# Patient Record
Sex: FEMALE | Race: WHITE | Employment: UNEMPLOYED | URBAN - METROPOLITAN AREA
[De-identification: names, ages, dates, MRNs, and addresses within clinical notes are randomized per-mention and may not be internally consistent; named-entity substitution may affect disease eponyms.]

---

## 2017-01-03 ENCOUNTER — OFFICE VISIT (OUTPATIENT)
Dept: FAMILY MEDICINE CLINIC | Facility: CLINIC | Age: 62
End: 2017-01-03

## 2017-01-03 VITALS
WEIGHT: 150 LBS | HEART RATE: 78 BPM | BODY MASS INDEX: 24.99 KG/M2 | TEMPERATURE: 99 F | DIASTOLIC BLOOD PRESSURE: 68 MMHG | RESPIRATION RATE: 16 BRPM | HEIGHT: 65 IN | SYSTOLIC BLOOD PRESSURE: 112 MMHG

## 2017-01-03 DIAGNOSIS — I10 ESSENTIAL HYPERTENSION: ICD-10-CM

## 2017-01-03 DIAGNOSIS — E03.9 HYPOTHYROIDISM, UNSPECIFIED TYPE: ICD-10-CM

## 2017-01-03 DIAGNOSIS — D22.9 ATYPICAL NEVI: ICD-10-CM

## 2017-01-03 DIAGNOSIS — Z23 NEED FOR INFLUENZA VACCINATION: ICD-10-CM

## 2017-01-03 DIAGNOSIS — E04.9 GOITER: ICD-10-CM

## 2017-01-03 DIAGNOSIS — Z12.31 ENCOUNTER FOR SCREENING MAMMOGRAM FOR HIGH-RISK PATIENT: ICD-10-CM

## 2017-01-03 DIAGNOSIS — E78.00 ELEVATED CHOLESTEROL: ICD-10-CM

## 2017-01-03 DIAGNOSIS — F17.200 SMOKER: ICD-10-CM

## 2017-01-03 DIAGNOSIS — R91.1 LUNG NODULE: ICD-10-CM

## 2017-01-03 DIAGNOSIS — Z01.419 WELL WOMAN EXAM WITH ROUTINE GYNECOLOGICAL EXAM: Primary | ICD-10-CM

## 2017-01-03 PROCEDURE — 90472 IMMUNIZATION ADMIN EACH ADD: CPT | Performed by: FAMILY MEDICINE

## 2017-01-03 PROCEDURE — 99396 PREV VISIT EST AGE 40-64: CPT | Performed by: FAMILY MEDICINE

## 2017-01-03 PROCEDURE — 90670 PCV13 VACCINE IM: CPT | Performed by: FAMILY MEDICINE

## 2017-01-03 PROCEDURE — 90686 IIV4 VACC NO PRSV 0.5 ML IM: CPT | Performed by: FAMILY MEDICINE

## 2017-01-03 PROCEDURE — 90471 IMMUNIZATION ADMIN: CPT | Performed by: FAMILY MEDICINE

## 2017-01-03 RX ORDER — SIMVASTATIN 20 MG
TABLET ORAL
Qty: 90 TABLET | Refills: 1 | Status: SHIPPED | OUTPATIENT
Start: 2017-01-03 | End: 2017-08-24

## 2017-01-03 RX ORDER — METOPROLOL SUCCINATE 50 MG/1
50 TABLET, EXTENDED RELEASE ORAL
Qty: 90 TABLET | Refills: 1 | Status: SHIPPED | OUTPATIENT
Start: 2017-01-03 | End: 2017-08-24

## 2017-01-03 RX ORDER — LEVOTHYROXINE SODIUM 88 UG/1
88 TABLET ORAL
Qty: 90 TABLET | Refills: 1 | Status: SHIPPED | OUTPATIENT
Start: 2017-01-03 | End: 2017-08-24

## 2017-01-03 NOTE — PROGRESS NOTES
HPI:   Keven Anderson is a 62year old female who presents for a complete physical exam.     Wt Readings from Last 6 Encounters:  01/03/17 : 150 lb  12/08/16 : 147 lb  09/02/16 : 149 lb  07/09/16 : 153 lb  06/24/16 : 150 lb  06/10/16 : 150 lb    Body mass Tablet 24 Hr TAKE 1 TABLET BY MOUTH EVERY DAY Disp: 30 tablet Rfl: 0   Hydroxychloroquine Sulfate (PLAQUENIL) 200 MG Oral Tab Take 1 tablet (200 mg total) by mouth 2 (two) times daily.  Disp: 180 tablet Rfl: 3   naproxen 500 MG Oral Tab Take 1 tablet (500 m Comment adenoidectomy    NEEDLE BIOPSY RIGHT  2012    Comment US guided biopsy-benign    TAISHA NEEDLE LOCALIZATION W/ SPECIMEN 1 SITE RIGHT  0323,1217    TAISHA NEEDLE LOCALIZATION W/ SPECIMEN 1 SITE LEFT  1999    Comment ADH    OOPHORECTOMY  1989      [facility] H tenderness  CHEST: no chest tenderness  BREAST: no axillary LAD, no masses no nipple discharge bilaterally  : external genitalia - no inguinal LAD, no lesions. Speculum exam- introitus is normal,scant discharge, normal vaginal cuff.  Bimanual exam- no ad patient indicates understanding of these issues and agrees to the plan. Follow up in  6 months or sooner if needed.

## 2017-02-16 ENCOUNTER — LAB ENCOUNTER (OUTPATIENT)
Dept: LAB | Age: 62
End: 2017-02-16
Attending: FAMILY MEDICINE
Payer: COMMERCIAL

## 2017-02-16 ENCOUNTER — HOSPITAL ENCOUNTER (OUTPATIENT)
Dept: ULTRASOUND IMAGING | Age: 62
Discharge: HOME | End: 2017-02-16
Attending: FAMILY MEDICINE
Payer: COMMERCIAL

## 2017-02-16 ENCOUNTER — HOSPITAL ENCOUNTER (OUTPATIENT)
Dept: MAMMOGRAPHY | Age: 62
Discharge: HOME | End: 2017-02-16
Attending: FAMILY MEDICINE
Payer: COMMERCIAL

## 2017-02-16 DIAGNOSIS — E04.9 GOITER: ICD-10-CM

## 2017-02-16 DIAGNOSIS — Z12.31 ENCOUNTER FOR SCREENING MAMMOGRAM FOR HIGH-RISK PATIENT: ICD-10-CM

## 2017-02-16 DIAGNOSIS — Z01.419 WELL WOMAN EXAM WITH ROUTINE GYNECOLOGICAL EXAM: ICD-10-CM

## 2017-02-16 LAB
25-HYDROXYVITAMIN D (TOTAL): 31 NG/ML (ref 30–100)
ALBUMIN SERPL-MCNC: 4 G/DL (ref 3.5–4.8)
ALP LIVER SERPL-CCNC: 95 U/L (ref 46–118)
ALT SERPL-CCNC: 27 U/L (ref 14–54)
AST SERPL-CCNC: 23 U/L (ref 15–41)
BASOPHILS # BLD AUTO: 0.07 X10(3) UL (ref 0–0.1)
BASOPHILS NFR BLD AUTO: 0.8 %
BILIRUB SERPL-MCNC: 0.8 MG/DL (ref 0.1–2)
BUN BLD-MCNC: 13 MG/DL (ref 8–20)
CALCIUM BLD-MCNC: 8.2 MG/DL (ref 8.3–10.3)
CHLORIDE: 106 MMOL/L (ref 101–111)
CHOLEST SMN-MCNC: 204 MG/DL (ref ?–200)
CO2: 27 MMOL/L (ref 22–32)
CREAT BLD-MCNC: 0.98 MG/DL (ref 0.55–1.02)
EOSINOPHIL # BLD AUTO: 0.2 X10(3) UL (ref 0–0.3)
EOSINOPHIL NFR BLD AUTO: 2.3 %
ERYTHROCYTE [DISTWIDTH] IN BLOOD BY AUTOMATED COUNT: 14.6 % (ref 11.5–16)
FREE T4: 1 NG/DL (ref 0.9–1.8)
GLUCOSE BLD-MCNC: 88 MG/DL (ref 70–99)
HAV AB SERPL IA-ACNC: 635 PG/ML (ref 193–986)
HCT VFR BLD AUTO: 45.4 % (ref 34–50)
HDLC SERPL-MCNC: 89 MG/DL (ref 45–?)
HDLC SERPL: 2.29 {RATIO} (ref ?–4.44)
HGB BLD-MCNC: 14 G/DL (ref 12–16)
IMMATURE GRANULOCYTE COUNT: 0.03 X10(3) UL (ref 0–1)
IMMATURE GRANULOCYTE RATIO %: 0.3 %
LDLC SERPL CALC-MCNC: 104 MG/DL (ref ?–130)
LYMPHOCYTES # BLD AUTO: 2.17 X10(3) UL (ref 0.9–4)
LYMPHOCYTES NFR BLD AUTO: 24.5 %
M PROTEIN MFR SERPL ELPH: 7.2 G/DL (ref 6.1–8.3)
MCH RBC QN AUTO: 29.8 PG (ref 27–33.2)
MCHC RBC AUTO-ENTMCNC: 30.8 G/DL (ref 31–37)
MCV RBC AUTO: 96.6 FL (ref 81–100)
MONOCYTES # BLD AUTO: 0.52 X10(3) UL (ref 0.1–0.6)
MONOCYTES NFR BLD AUTO: 5.9 %
NEUTROPHIL ABS PRELIM: 5.86 X10 (3) UL (ref 1.3–6.7)
NEUTROPHILS # BLD AUTO: 5.86 X10(3) UL (ref 1.3–6.7)
NEUTROPHILS NFR BLD AUTO: 66.2 %
NONHDLC SERPL-MCNC: 115 MG/DL (ref ?–130)
PLATELET # BLD AUTO: 294 10(3)UL (ref 150–450)
POTASSIUM SERPL-SCNC: 4.4 MMOL/L (ref 3.6–5.1)
RBC # BLD AUTO: 4.7 X10(6)UL (ref 3.8–5.1)
RED CELL DISTRIBUTION WIDTH-SD: 51.4 FL (ref 35.1–46.3)
SODIUM SERPL-SCNC: 141 MMOL/L (ref 136–144)
T3FREE SERPL-MCNC: 2.18 PG/ML (ref 2.3–4.2)
TRIGLYCERIDES: 56 MG/DL (ref ?–150)
TSI SER-ACNC: 1.06 MIU/ML (ref 0.35–5.5)
VLDL: 11 MG/DL (ref 5–40)
WBC # BLD AUTO: 8.9 X10(3) UL (ref 4–13)

## 2017-02-16 PROCEDURE — 80061 LIPID PANEL: CPT

## 2017-02-16 PROCEDURE — 77067 SCR MAMMO BI INCL CAD: CPT

## 2017-02-16 PROCEDURE — 84439 ASSAY OF FREE THYROXINE: CPT

## 2017-02-16 PROCEDURE — 84443 ASSAY THYROID STIM HORMONE: CPT

## 2017-02-16 PROCEDURE — 82607 VITAMIN B-12: CPT

## 2017-02-16 PROCEDURE — 85025 COMPLETE CBC W/AUTO DIFF WBC: CPT

## 2017-02-16 PROCEDURE — 84481 FREE ASSAY (FT-3): CPT

## 2017-02-16 PROCEDURE — 80053 COMPREHEN METABOLIC PANEL: CPT

## 2017-02-16 PROCEDURE — 82306 VITAMIN D 25 HYDROXY: CPT

## 2017-02-16 PROCEDURE — 77063 BREAST TOMOSYNTHESIS BI: CPT

## 2017-02-16 PROCEDURE — 36415 COLL VENOUS BLD VENIPUNCTURE: CPT

## 2017-02-16 PROCEDURE — 76536 US EXAM OF HEAD AND NECK: CPT

## 2017-03-23 ENCOUNTER — LAB ENCOUNTER (OUTPATIENT)
Dept: LAB | Age: 62
End: 2017-03-23
Attending: DERMATOLOGY
Payer: COMMERCIAL

## 2017-03-23 DIAGNOSIS — R23.2 FLUSHING: Primary | ICD-10-CM

## 2017-03-23 LAB
CREAT UR-SCNC: 1.47 G/24 HR (ref 0.6–1.8)
SPECIMEN VOL UR: 1800 ML

## 2017-03-23 PROCEDURE — 36415 COLL VENOUS BLD VENIPUNCTURE: CPT

## 2017-03-23 PROCEDURE — 83835 ASSAY OF METANEPHRINES: CPT

## 2017-03-23 PROCEDURE — 83520 IMMUNOASSAY QUANT NOS NONAB: CPT

## 2017-03-23 PROCEDURE — 82570 ASSAY OF URINE CREATININE: CPT

## 2017-03-23 PROCEDURE — 83497 ASSAY OF 5-HIAA: CPT

## 2017-03-25 ENCOUNTER — NURSE ONLY (OUTPATIENT)
Dept: FAMILY MEDICINE CLINIC | Facility: CLINIC | Age: 62
End: 2017-03-25

## 2017-03-25 PROCEDURE — 90732 PPSV23 VACC 2 YRS+ SUBQ/IM: CPT | Performed by: FAMILY MEDICINE

## 2017-03-25 PROCEDURE — 90471 IMMUNIZATION ADMIN: CPT | Performed by: FAMILY MEDICINE

## 2017-03-26 LAB
5-HIAA URINE - PER 24H: 4 MG/D
5-HIAA URINE - PER VOLUME: 2.5 MG/L
5-HIAA URINE - RATIO TO CRT: 3 MG/GCR
CREATININE, URINE - PER 24H: 1332 MG/D
CREATININE, URINE - PER VOLUME: 74 MG/DL
HOURS COLLECTED: 24 HR
TOTAL VOLUME: 1800 ML
TRYPTASE: 8.4 UG/L

## 2017-03-28 LAB
CREATININE, URINE - PER 24H: 1332 MG/D
CREATININE, URINE - PER VOLUME: 74 MG/DL
HOURS COLLECTED: 24 HR
METANEPHRINE, UR- RATIO TO CRT: 85 UG/G CRT
METANEPHRINE, URINE - PER 24H: 113 UG/D
METANEPHRINE, URINE-PER VOLUME: 63 UG/L
NORMETANEPHRINE, UR-PER VOLUME: 218 UG/L
NORMETANEPHRINE, URINE - RATIO: 295 UG/G CRT
NORMETANEPHRINE, URINE-PER 24H: 392 UG/D
TOTAL VOLUME: 1800 ML

## 2017-05-23 ENCOUNTER — TELEPHONE (OUTPATIENT)
Dept: FAMILY MEDICINE CLINIC | Facility: CLINIC | Age: 62
End: 2017-05-23

## 2017-05-23 RX ORDER — PREDNISONE 50 MG/1
TABLET ORAL
Qty: 3 TABLET | Refills: 0 | Status: SHIPPED | OUTPATIENT
Start: 2017-05-23 | End: 2017-05-25

## 2017-05-23 NOTE — TELEPHONE ENCOUNTER
Needs prep kit for chest CT because your allergic to iodine. Patient will also need instructions on how to take. Test is Friday. Please call patient.  Tank Haywood,  Prednisone 50mg by mouth at 13 hours, 7 hours and 1 hour pr

## 2017-05-23 NOTE — TELEPHONE ENCOUNTER
rx sent to pharmacy. Patient called with instructions. informed patient she will need 50 mg of Benadryl to take 1 hour prior to test. Patient voiced understanding of instructions.

## 2017-05-24 ENCOUNTER — TELEPHONE (OUTPATIENT)
Dept: FAMILY MEDICINE CLINIC | Facility: CLINIC | Age: 62
End: 2017-05-24

## 2017-05-24 RX ORDER — METOPROLOL SUCCINATE 50 MG/1
TABLET, EXTENDED RELEASE ORAL
Qty: 30 TABLET | Refills: 0 | OUTPATIENT
Start: 2017-05-24

## 2017-05-24 RX ORDER — LEVOTHYROXINE SODIUM 88 UG/1
TABLET ORAL
Qty: 30 TABLET | Refills: 0 | OUTPATIENT
Start: 2017-05-24

## 2017-05-24 NOTE — TELEPHONE ENCOUNTER
Message from CT department:  Coming in on Friday for CT chest with contrast for lung nodule.  Don't normally do contrast for these.  Last one was recommended for fup without contrast.  Pt is to be premedicated which won't be necessary if she isn't going to

## 2017-05-25 NOTE — TELEPHONE ENCOUNTER
Informed pt, instructed not to  the Prednisone, requested a return call with questions. Called pharmacy, Pt has not yet picked up medication, cancelled RX. Task completed.

## 2017-05-26 ENCOUNTER — HOSPITAL ENCOUNTER (OUTPATIENT)
Dept: CT IMAGING | Age: 62
Discharge: HOME | End: 2017-05-26
Attending: FAMILY MEDICINE
Payer: COMMERCIAL

## 2017-05-26 DIAGNOSIS — R91.1 LUNG NODULE: ICD-10-CM

## 2017-05-26 DIAGNOSIS — F17.200 SMOKER: ICD-10-CM

## 2017-05-26 PROCEDURE — 71250 CT THORAX DX C-: CPT | Performed by: FAMILY MEDICINE

## 2017-06-22 ENCOUNTER — OFFICE VISIT (OUTPATIENT)
Dept: FAMILY MEDICINE CLINIC | Facility: CLINIC | Age: 62
End: 2017-06-22

## 2017-06-22 VITALS
HEIGHT: 65 IN | DIASTOLIC BLOOD PRESSURE: 72 MMHG | SYSTOLIC BLOOD PRESSURE: 116 MMHG | BODY MASS INDEX: 25.16 KG/M2 | OXYGEN SATURATION: 98 % | RESPIRATION RATE: 16 BRPM | TEMPERATURE: 98 F | HEART RATE: 70 BPM | WEIGHT: 151 LBS

## 2017-06-22 DIAGNOSIS — N30.00 ACUTE CYSTITIS WITHOUT HEMATURIA: Primary | ICD-10-CM

## 2017-06-22 PROCEDURE — 99213 OFFICE O/P EST LOW 20 MIN: CPT | Performed by: NURSE PRACTITIONER

## 2017-06-22 PROCEDURE — 87077 CULTURE AEROBIC IDENTIFY: CPT | Performed by: NURSE PRACTITIONER

## 2017-06-22 PROCEDURE — 87186 SC STD MICRODIL/AGAR DIL: CPT | Performed by: NURSE PRACTITIONER

## 2017-06-22 PROCEDURE — 81003 URINALYSIS AUTO W/O SCOPE: CPT | Performed by: NURSE PRACTITIONER

## 2017-06-22 PROCEDURE — 87086 URINE CULTURE/COLONY COUNT: CPT | Performed by: NURSE PRACTITIONER

## 2017-06-22 RX ORDER — NITROFURANTOIN 25; 75 MG/1; MG/1
CAPSULE ORAL
Qty: 14 CAPSULE | Refills: 0 | Status: SHIPPED | OUTPATIENT
Start: 2017-06-22 | End: 2017-08-24

## 2017-06-22 RX ORDER — PHENAZOPYRIDINE HYDROCHLORIDE 200 MG/1
TABLET, FILM COATED ORAL
Qty: 6 TABLET | Refills: 0 | Status: SHIPPED | OUTPATIENT
Start: 2017-06-22 | End: 2017-06-29

## 2017-06-29 ENCOUNTER — OFFICE VISIT (OUTPATIENT)
Dept: FAMILY MEDICINE CLINIC | Facility: CLINIC | Age: 62
End: 2017-06-29

## 2017-06-29 VITALS
TEMPERATURE: 103 F | HEART RATE: 112 BPM | WEIGHT: 151 LBS | OXYGEN SATURATION: 97 % | SYSTOLIC BLOOD PRESSURE: 128 MMHG | HEIGHT: 65 IN | BODY MASS INDEX: 25.16 KG/M2 | RESPIRATION RATE: 16 BRPM | DIASTOLIC BLOOD PRESSURE: 84 MMHG

## 2017-06-29 DIAGNOSIS — M54.9 ACUTE LEFT-SIDED BACK PAIN, UNSPECIFIED BACK LOCATION: ICD-10-CM

## 2017-06-29 DIAGNOSIS — R50.9 FEVER AND CHILLS: Primary | ICD-10-CM

## 2017-06-29 PROCEDURE — 99214 OFFICE O/P EST MOD 30 MIN: CPT | Performed by: FAMILY MEDICINE

## 2017-06-29 PROCEDURE — 87086 URINE CULTURE/COLONY COUNT: CPT | Performed by: FAMILY MEDICINE

## 2017-06-29 PROCEDURE — 96372 THER/PROPH/DIAG INJ SC/IM: CPT | Performed by: FAMILY MEDICINE

## 2017-06-29 RX ORDER — ONDANSETRON 4 MG/1
4 TABLET, ORALLY DISINTEGRATING ORAL EVERY 8 HOURS PRN
Qty: 20 TABLET | Refills: 0 | Status: SHIPPED | OUTPATIENT
Start: 2017-06-29 | End: 2017-08-24

## 2017-06-29 RX ORDER — CEFTRIAXONE 1 G/1
1 INJECTION, POWDER, FOR SOLUTION INTRAMUSCULAR; INTRAVENOUS ONCE
Status: COMPLETED | OUTPATIENT
Start: 2017-06-29 | End: 2017-06-29

## 2017-06-29 RX ORDER — CEPHALEXIN 500 MG/1
1000 CAPSULE ORAL 2 TIMES DAILY
Qty: 40 CAPSULE | Refills: 0 | Status: SHIPPED | OUTPATIENT
Start: 2017-06-29 | End: 2017-07-09

## 2017-06-29 RX ADMIN — CEFTRIAXONE 1 G: 1 INJECTION, POWDER, FOR SOLUTION INTRAMUSCULAR; INTRAVENOUS at 12:39:00

## 2017-06-29 RX ADMIN — CEFTRIAXONE 1 G: 1 INJECTION, POWDER, FOR SOLUTION INTRAMUSCULAR; INTRAVENOUS at 12:40:00

## 2017-06-29 NOTE — PROGRESS NOTES
HPI:   Tarsha Roberts is a 65year old female here to discuss left flank pain     Pt was seen last week for UTI   Put on an abx that was appropriate   Pt did not improve ; she now has left flank pain; no h/o kidney stones  Pain is constant 5/10 pain   + fe daily. Disp:  Rfl:    Cholecalciferol (VITAMIN D-3 OR) Take 1,000 Units by mouth daily. Disp:  Rfl:    VITAMIN E Take 400 mg by mouth daily. Disp:  Rfl:    Cyanocobalamin (VITAMIN B-12 CR OR) Take  by mouth.  Disp:  Rfl:    Calcium Carbonate (CALTRATE 6 otherwise  SKIN: denies any unusual skin lesions  EYES:denies blurred vision or double vision  HEENT: denies nasal congestion, sinus pain or ST  LUNGS: denies shortness of breath with exertion  CARDIOVASCULAR: denies chest pain on exertion  GI: denies abdo every 8 (eight) hours as needed for Nausea. Dispense: 20 tablet; Refill: 0  - US KIDNEYS (CDQ=[id number]);  Future  - URINE CULTURE, ROUTINE; Future  - URINE CULTURE, ROUTINE      Questions answered and patient indicates understanding of these issues and agrees

## 2017-08-03 ENCOUNTER — RT VISIT (OUTPATIENT)
Dept: RESPIRATORY THERAPY | Facility: HOSPITAL | Age: 62
End: 2017-08-03
Attending: INTERNAL MEDICINE
Payer: COMMERCIAL

## 2017-08-03 ENCOUNTER — HOSPITAL ENCOUNTER (OUTPATIENT)
Dept: CT IMAGING | Facility: HOSPITAL | Age: 62
Discharge: HOME | End: 2017-08-03
Attending: INTERNAL MEDICINE
Payer: COMMERCIAL

## 2017-08-03 DIAGNOSIS — R91.8 MULTIPLE NODULES OF LUNG: ICD-10-CM

## 2017-08-03 PROCEDURE — 94726 PLETHYSMOGRAPHY LUNG VOLUMES: CPT

## 2017-08-03 PROCEDURE — 71250 CT THORAX DX C-: CPT | Performed by: INTERNAL MEDICINE

## 2017-08-03 PROCEDURE — 94729 DIFFUSING CAPACITY: CPT

## 2017-08-03 PROCEDURE — 94010 BREATHING CAPACITY TEST: CPT

## 2017-08-07 NOTE — PROCEDURES
Spirometry and flow volume loop appear normal with no evidence of airway obstruction     Normal TLC, no evidence of restriction  There is mild increased RV / TLC ratio with normal TLC , suggestive of air trapping     The diffusing capacity is normal

## 2017-08-12 ENCOUNTER — MED REC SCAN ONLY (OUTPATIENT)
Dept: FAMILY MEDICINE CLINIC | Facility: CLINIC | Age: 62
End: 2017-08-12

## 2017-08-17 RX ORDER — AMITRIPTYLINE HYDROCHLORIDE 25 MG/1
25 TABLET, FILM COATED ORAL NIGHTLY
Qty: 30 TABLET | Refills: 0 | Status: SHIPPED | OUTPATIENT
Start: 2017-08-17 | End: 2017-08-24

## 2017-08-24 ENCOUNTER — OFFICE VISIT (OUTPATIENT)
Dept: FAMILY MEDICINE CLINIC | Facility: CLINIC | Age: 62
End: 2017-08-24

## 2017-08-24 VITALS
BODY MASS INDEX: 25.83 KG/M2 | HEART RATE: 82 BPM | TEMPERATURE: 99 F | DIASTOLIC BLOOD PRESSURE: 68 MMHG | SYSTOLIC BLOOD PRESSURE: 122 MMHG | WEIGHT: 155 LBS | RESPIRATION RATE: 20 BRPM | HEIGHT: 65 IN

## 2017-08-24 DIAGNOSIS — G47.09 OTHER INSOMNIA: Primary | ICD-10-CM

## 2017-08-24 DIAGNOSIS — M35.9 UNDIFFERENTIATED CONNECTIVE TISSUE DISEASE (HCC): ICD-10-CM

## 2017-08-24 DIAGNOSIS — N30.00 ACUTE CYSTITIS WITHOUT HEMATURIA: ICD-10-CM

## 2017-08-24 DIAGNOSIS — E78.00 ELEVATED CHOLESTEROL: ICD-10-CM

## 2017-08-24 DIAGNOSIS — R53.81 MALAISE AND FATIGUE: ICD-10-CM

## 2017-08-24 DIAGNOSIS — E03.9 HYPOTHYROIDISM, UNSPECIFIED TYPE: ICD-10-CM

## 2017-08-24 DIAGNOSIS — E55.9 VITAMIN D DEFICIENCY: ICD-10-CM

## 2017-08-24 DIAGNOSIS — I10 ESSENTIAL HYPERTENSION: ICD-10-CM

## 2017-08-24 DIAGNOSIS — R53.83 MALAISE AND FATIGUE: ICD-10-CM

## 2017-08-24 DIAGNOSIS — R91.8 ABNORMAL CT SCAN, LUNG: ICD-10-CM

## 2017-08-24 PROCEDURE — 87086 URINE CULTURE/COLONY COUNT: CPT | Performed by: FAMILY MEDICINE

## 2017-08-24 PROCEDURE — 99214 OFFICE O/P EST MOD 30 MIN: CPT | Performed by: FAMILY MEDICINE

## 2017-08-24 RX ORDER — SIMVASTATIN 20 MG
TABLET ORAL
Qty: 90 TABLET | Refills: 1 | Status: SHIPPED | OUTPATIENT
Start: 2017-08-24 | End: 2018-06-04

## 2017-08-24 RX ORDER — AMITRIPTYLINE HYDROCHLORIDE 25 MG/1
25 TABLET, FILM COATED ORAL NIGHTLY
Qty: 90 TABLET | Refills: 1 | Status: SHIPPED | OUTPATIENT
Start: 2017-08-24 | End: 2018-06-04

## 2017-08-24 RX ORDER — LEVOTHYROXINE SODIUM 88 UG/1
88 TABLET ORAL
Qty: 90 TABLET | Refills: 1 | Status: SHIPPED | OUTPATIENT
Start: 2017-08-24 | End: 2019-02-01

## 2017-08-24 RX ORDER — CEFDINIR 300 MG/1
300 CAPSULE ORAL 2 TIMES DAILY
Qty: 20 CAPSULE | Refills: 0 | Status: SHIPPED | OUTPATIENT
Start: 2017-08-24 | End: 2017-09-03

## 2017-08-24 RX ORDER — METOPROLOL SUCCINATE 50 MG/1
50 TABLET, EXTENDED RELEASE ORAL
Qty: 90 TABLET | Refills: 1 | Status: SHIPPED | OUTPATIENT
Start: 2017-08-24 | End: 2018-06-04

## 2017-08-24 NOTE — PROGRESS NOTES
HPI:   Saurabh Davis is a 65year old female who presents for follow up on multiple medical problems. Pt reports she recovered well from her UTI  Pt reports her urine has been dark.     Pt reports her insomnia is well controlled with the amitriptyline by Each Nare route daily. (Patient taking differently: 2 sprays by Each Nare route as needed.  ) Disp: 1 Bottle Rfl: 1   aspirin 81 MG Oral Tab Take 81 mg by mouth daily. Disp:  Rfl:    Cholecalciferol (VITAMIN D-3 OR) Take 1,000 Units by mouth daily.    Lloyd Proctor Comment: socially    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   Diet: watches minimally     REVIEW OF SYSTEMS:   GENERAL: feels well otherwise  SKIN: denies any unusual skin lesions  EYES:denies blurred vision or double vision  HEENT: rupert Essential hypertension    - Metoprolol Succinate ER 50 MG Oral Tablet 24 Hr; Take 1 tablet (50 mg total) by mouth once daily. Dispense: 90 tablet; Refill: 1  - LIPID PANEL; Future  - COMP METABOLIC PANEL (14); Future    5.  Other insomnia    - Amitriptylin

## 2017-08-27 PROBLEM — R91.8 ABNORMAL CT SCAN, LUNG: Status: ACTIVE | Noted: 2017-08-27

## 2017-10-25 ENCOUNTER — TELEPHONE (OUTPATIENT)
Dept: FAMILY MEDICINE CLINIC | Facility: CLINIC | Age: 62
End: 2017-10-25

## 2017-10-27 ENCOUNTER — HOSPITAL ENCOUNTER (OUTPATIENT)
Dept: GENERAL RADIOLOGY | Age: 62
Discharge: HOME | End: 2017-10-27
Attending: FAMILY MEDICINE
Payer: COMMERCIAL

## 2017-10-27 ENCOUNTER — OFFICE VISIT (OUTPATIENT)
Dept: FAMILY MEDICINE CLINIC | Facility: CLINIC | Age: 62
End: 2017-10-27

## 2017-10-27 VITALS
BODY MASS INDEX: 24.32 KG/M2 | TEMPERATURE: 98 F | RESPIRATION RATE: 18 BRPM | DIASTOLIC BLOOD PRESSURE: 68 MMHG | HEIGHT: 65 IN | HEART RATE: 74 BPM | WEIGHT: 146 LBS | SYSTOLIC BLOOD PRESSURE: 122 MMHG

## 2017-10-27 DIAGNOSIS — R10.9 RIGHT FLANK PAIN: Primary | ICD-10-CM

## 2017-10-27 DIAGNOSIS — R10.9 RIGHT FLANK PAIN: ICD-10-CM

## 2017-10-27 PROCEDURE — 81003 URINALYSIS AUTO W/O SCOPE: CPT | Performed by: FAMILY MEDICINE

## 2017-10-27 PROCEDURE — 87086 URINE CULTURE/COLONY COUNT: CPT | Performed by: FAMILY MEDICINE

## 2017-10-27 PROCEDURE — 71020 XR CHEST PA + LAT CHEST (CPT=71020): CPT | Performed by: FAMILY MEDICINE

## 2017-10-27 PROCEDURE — 99214 OFFICE O/P EST MOD 30 MIN: CPT | Performed by: FAMILY MEDICINE

## 2017-10-27 RX ORDER — CYCLOBENZAPRINE HCL 5 MG
5 TABLET ORAL 3 TIMES DAILY PRN
Qty: 20 TABLET | Refills: 0 | Status: SHIPPED | OUTPATIENT
Start: 2017-10-27 | End: 2019-10-25

## 2017-10-27 NOTE — PROGRESS NOTES
HPI:   Cris Brown is a 65year old female who presents right posterior \"rib pain\"    It started 2 months ago   Off and on ; \"more on\" lately   Pt worried about her lungs  Pt reports suddenly she will have severe pain that will then cause sob   No r unspecified    • Basal cell carcinoma of skin    • Depressive disorder, not elsewhere classified    • Endometriosis, site unspecified    • Insomnia, unspecified    • Neuralgia august 2015      Past Surgical History:  No date: APPENDECTOMY  No date: BREAST or anxiety  HEMATOLOGIC: denies hx of anemia  ALL/ASTHMA: denies  asthma    EXAM:   /68   Pulse 74   Temp 97.6 °F (36.4 °C) (Oral)   Resp 18   Ht 65\"   Wt 146 lb   BMI 24.30 kg/m²   Body mass index is 24.3 kg/m².    GENERAL: alert and oriented X 3, w

## 2017-12-14 RX ORDER — HYDROXYCHLOROQUINE SULFATE 200 MG/1
TABLET, FILM COATED ORAL
Qty: 180 TABLET | Refills: 2 | OUTPATIENT
Start: 2017-12-14

## 2017-12-14 NOTE — TELEPHONE ENCOUNTER
LOV 12/8/16  Future Appointments  Date Time Provider Cesar Walters   1/17/2018 11:15 AM Eber Man MD [facility]   '  No appt for Dr. Kanu Carrasco

## 2018-04-04 DIAGNOSIS — E03.9 HYPOTHYROIDISM, UNSPECIFIED TYPE: ICD-10-CM

## 2018-04-04 DIAGNOSIS — I10 ESSENTIAL HYPERTENSION: ICD-10-CM

## 2018-04-04 RX ORDER — LEVOTHYROXINE SODIUM 88 UG/1
TABLET ORAL
Qty: 90 TABLET | Refills: 0 | Status: SHIPPED | OUTPATIENT
Start: 2018-04-04 | End: 2018-06-04

## 2018-04-04 RX ORDER — METOPROLOL SUCCINATE 50 MG/1
TABLET, EXTENDED RELEASE ORAL
Qty: 90 TABLET | Refills: 0 | Status: SHIPPED | OUTPATIENT
Start: 2018-04-04 | End: 2018-06-04

## 2018-05-02 ENCOUNTER — OFFICE VISIT (OUTPATIENT)
Dept: FAMILY MEDICINE CLINIC | Facility: CLINIC | Age: 63
End: 2018-05-02

## 2018-05-02 VITALS
DIASTOLIC BLOOD PRESSURE: 76 MMHG | OXYGEN SATURATION: 96 % | BODY MASS INDEX: 26.33 KG/M2 | SYSTOLIC BLOOD PRESSURE: 122 MMHG | WEIGHT: 158 LBS | RESPIRATION RATE: 16 BRPM | TEMPERATURE: 98 F | HEART RATE: 76 BPM | HEIGHT: 65 IN

## 2018-05-02 DIAGNOSIS — E04.9 GOITER: ICD-10-CM

## 2018-05-02 DIAGNOSIS — H04.129 DRY EYE: ICD-10-CM

## 2018-05-02 DIAGNOSIS — M35.9 UNDIFFERENTIATED CONNECTIVE TISSUE DISEASE (HCC): Primary | ICD-10-CM

## 2018-05-02 DIAGNOSIS — R68.2 DRY MOUTH: ICD-10-CM

## 2018-05-02 DIAGNOSIS — Z12.11 COLON CANCER SCREENING: ICD-10-CM

## 2018-05-02 DIAGNOSIS — R76.8 POSITIVE ANA (ANTINUCLEAR ANTIBODY): ICD-10-CM

## 2018-05-02 DIAGNOSIS — L53.9 REDNESS: ICD-10-CM

## 2018-05-02 PROCEDURE — 99214 OFFICE O/P EST MOD 30 MIN: CPT | Performed by: FAMILY MEDICINE

## 2018-05-02 NOTE — PROGRESS NOTES
HPI:   Lillian Galvan is a 59year old female who presents with weight gain, skin changes, dry eye and dry mouth    Pt has quit smoking   Pt quit last may   Pt feels she may have a thyroid issue and she  feels it is related to her thyroid     Pt has been f Nasal Suspension 2 sprays by Each Nare route daily. (Patient taking differently: 2 sprays by Each Nare route as needed.  ) Disp: 1 Bottle Rfl: 1   aspirin 81 MG Oral Tab Take 81 mg by mouth daily.  Disp:  Rfl:    Cholecalciferol (VITAMIN D-3 OR) Take 1,000 Alcohol use: No               Comment: socially    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   Diet: watches minimally     REVIEW OF SYSTEMS:   GENERAL: feels well otherwise  SKIN: denies any unusual skin lesions  EYES:denies blurred visi

## 2018-05-03 ENCOUNTER — APPOINTMENT (OUTPATIENT)
Dept: LAB | Age: 63
End: 2018-05-03
Attending: FAMILY MEDICINE
Payer: COMMERCIAL

## 2018-05-03 ENCOUNTER — APPOINTMENT (OUTPATIENT)
Dept: LAB | Facility: HOSPITAL | Age: 63
End: 2018-05-03
Attending: FAMILY MEDICINE
Payer: COMMERCIAL

## 2018-05-03 DIAGNOSIS — Z12.11 COLON CANCER SCREENING: ICD-10-CM

## 2018-05-03 DIAGNOSIS — E55.9 VITAMIN D DEFICIENCY: ICD-10-CM

## 2018-05-03 DIAGNOSIS — E78.00 ELEVATED CHOLESTEROL: ICD-10-CM

## 2018-05-03 DIAGNOSIS — I10 ESSENTIAL HYPERTENSION: ICD-10-CM

## 2018-05-03 DIAGNOSIS — E03.9 HYPOTHYROIDISM, UNSPECIFIED TYPE: ICD-10-CM

## 2018-05-03 PROCEDURE — 82306 VITAMIN D 25 HYDROXY: CPT

## 2018-05-03 PROCEDURE — 82272 OCCULT BLD FECES 1-3 TESTS: CPT

## 2018-05-03 PROCEDURE — 84439 ASSAY OF FREE THYROXINE: CPT

## 2018-05-03 PROCEDURE — 80061 LIPID PANEL: CPT

## 2018-05-03 PROCEDURE — 80053 COMPREHEN METABOLIC PANEL: CPT

## 2018-05-03 PROCEDURE — 36415 COLL VENOUS BLD VENIPUNCTURE: CPT

## 2018-05-03 PROCEDURE — 84443 ASSAY THYROID STIM HORMONE: CPT

## 2018-06-04 ENCOUNTER — OFFICE VISIT (OUTPATIENT)
Dept: FAMILY MEDICINE CLINIC | Facility: CLINIC | Age: 63
End: 2018-06-04

## 2018-06-04 VITALS
SYSTOLIC BLOOD PRESSURE: 130 MMHG | OXYGEN SATURATION: 98 % | RESPIRATION RATE: 20 BRPM | WEIGHT: 153 LBS | BODY MASS INDEX: 25.49 KG/M2 | TEMPERATURE: 99 F | DIASTOLIC BLOOD PRESSURE: 80 MMHG | HEIGHT: 65 IN | HEART RATE: 72 BPM

## 2018-06-04 DIAGNOSIS — E03.9 HYPOTHYROIDISM, UNSPECIFIED TYPE: ICD-10-CM

## 2018-06-04 DIAGNOSIS — Z98.890: ICD-10-CM

## 2018-06-04 DIAGNOSIS — M35.9 UNDIFFERENTIATED CONNECTIVE TISSUE DISEASE (HCC): Primary | ICD-10-CM

## 2018-06-04 DIAGNOSIS — I10 ESSENTIAL HYPERTENSION: ICD-10-CM

## 2018-06-04 DIAGNOSIS — G47.09 OTHER INSOMNIA: ICD-10-CM

## 2018-06-04 DIAGNOSIS — Z12.31 ENCOUNTER FOR SCREENING MAMMOGRAM FOR HIGH-RISK PATIENT: ICD-10-CM

## 2018-06-04 DIAGNOSIS — E78.00 ELEVATED CHOLESTEROL: ICD-10-CM

## 2018-06-04 PROCEDURE — 99214 OFFICE O/P EST MOD 30 MIN: CPT | Performed by: FAMILY MEDICINE

## 2018-06-04 RX ORDER — HYDROXYCHLOROQUINE SULFATE 200 MG/1
200 TABLET, FILM COATED ORAL DAILY
Qty: 90 TABLET | Refills: 0 | Status: SHIPPED | OUTPATIENT
Start: 2018-06-04 | End: 2018-08-21

## 2018-06-04 RX ORDER — LEVOTHYROXINE SODIUM 88 UG/1
88 TABLET ORAL
Qty: 90 TABLET | Refills: 1 | Status: SHIPPED | OUTPATIENT
Start: 2018-06-04 | End: 2018-07-24

## 2018-06-04 RX ORDER — AMITRIPTYLINE HYDROCHLORIDE 25 MG/1
25 TABLET, FILM COATED ORAL NIGHTLY
Qty: 90 TABLET | Refills: 1 | Status: SHIPPED | OUTPATIENT
Start: 2018-06-04 | End: 2019-02-17

## 2018-06-04 RX ORDER — SIMVASTATIN 20 MG
TABLET ORAL
Qty: 90 TABLET | Refills: 1 | Status: SHIPPED | OUTPATIENT
Start: 2018-06-04 | End: 2019-07-10

## 2018-06-04 RX ORDER — METOPROLOL SUCCINATE 50 MG/1
50 TABLET, EXTENDED RELEASE ORAL
Qty: 90 TABLET | Refills: 1 | Status: SHIPPED | OUTPATIENT
Start: 2018-06-04 | End: 2019-04-16

## 2018-06-04 NOTE — PROGRESS NOTES
HPI:   Barry Padilla is a 59year old female who presents for f/u on UCT disorder, hypothyroid, HTN, elevated cholesterol and insomnia      Pt has quit smoking   Pt quit last may !!  Pt feels she may have a thyroid issue and she  feels it is related to he exam . Do not drive. Disp: 1 tablet Rfl: 0   BIOTIN OR Take by mouth. Disp:  Rfl:    Ranitidine HCl (ZANTAC) 150 MG Oral Tab Take 150 mg by mouth daily.  Disp:  Rfl:    Fluticasone Propionate (FLONASE) 50 MCG/ACT Nasal Suspension 2 sprays by Each Nare route Former Smoker                                                              Packs/day: 0.25      Years: 0.00         Types: Cigarettes     Quit date: 5/7/2015  Smokeless tobacco: Never Used                      Alcohol use:  No               Comment: sociall Other insomnia    - Amitriptyline HCl 25 MG Oral Tab; Take 1 tablet (25 mg total) by mouth nightly. Dispense: 90 tablet; Refill: 1    4.  Elevated cholesterol    - simvastatin 20 MG Oral Tab; TAKE 1 TABLET BY MOUTH EVERY DAY AT BEDTIME  Dispense: 90 tablet

## 2018-06-13 ENCOUNTER — HOSPITAL ENCOUNTER (OUTPATIENT)
Dept: GENERAL RADIOLOGY | Age: 63
Discharge: HOME | End: 2018-06-13
Attending: FAMILY MEDICINE
Payer: COMMERCIAL

## 2018-06-13 ENCOUNTER — HOSPITAL ENCOUNTER (OUTPATIENT)
Dept: ULTRASOUND IMAGING | Age: 63
Discharge: HOME | End: 2018-06-13
Attending: FAMILY MEDICINE
Payer: COMMERCIAL

## 2018-06-13 ENCOUNTER — HOSPITAL ENCOUNTER (OUTPATIENT)
Dept: MAMMOGRAPHY | Age: 63
Discharge: HOME | End: 2018-06-13
Attending: FAMILY MEDICINE
Payer: COMMERCIAL

## 2018-06-13 DIAGNOSIS — E04.9 GOITER: ICD-10-CM

## 2018-06-13 DIAGNOSIS — Z98.890: ICD-10-CM

## 2018-06-13 DIAGNOSIS — Z12.31 ENCOUNTER FOR SCREENING MAMMOGRAM FOR HIGH-RISK PATIENT: ICD-10-CM

## 2018-06-13 PROCEDURE — 77063 BREAST TOMOSYNTHESIS BI: CPT | Performed by: FAMILY MEDICINE

## 2018-06-13 PROCEDURE — 76536 US EXAM OF HEAD AND NECK: CPT | Performed by: FAMILY MEDICINE

## 2018-06-13 PROCEDURE — 77067 SCR MAMMO BI INCL CAD: CPT | Performed by: FAMILY MEDICINE

## 2018-06-13 PROCEDURE — 71046 X-RAY EXAM CHEST 2 VIEWS: CPT | Performed by: FAMILY MEDICINE

## 2018-06-28 DIAGNOSIS — T78.40XA ALLERGIC REACTION, INITIAL ENCOUNTER: ICD-10-CM

## 2018-06-28 RX ORDER — EPINEPHRINE 0.3 MG/.3ML
INJECTION SUBCUTANEOUS
Qty: 1 EACH | Refills: 0 | Status: SHIPPED | OUTPATIENT
Start: 2018-06-28

## 2018-08-18 ENCOUNTER — OFFICE VISIT (OUTPATIENT)
Dept: FAMILY MEDICINE CLINIC | Facility: CLINIC | Age: 63
End: 2018-08-18
Payer: COMMERCIAL

## 2018-08-18 VITALS
OXYGEN SATURATION: 97 % | BODY MASS INDEX: 26 KG/M2 | RESPIRATION RATE: 18 BRPM | HEART RATE: 64 BPM | TEMPERATURE: 98 F | DIASTOLIC BLOOD PRESSURE: 66 MMHG | SYSTOLIC BLOOD PRESSURE: 110 MMHG | WEIGHT: 156 LBS

## 2018-08-18 DIAGNOSIS — R39.9 UTI SYMPTOMS: Primary | ICD-10-CM

## 2018-08-18 LAB
MULTISTIX LOT#: ABNORMAL NUMERIC
PH, URINE: 5.5 (ref 4.5–8)
SPECIFIC GRAVITY: 1.03 (ref 1–1.03)
URINE-COLOR: YELLOW
UROBILINOGEN,SEMI-QN: 0.2 MG/DL (ref 0–1.9)

## 2018-08-18 PROCEDURE — 87086 URINE CULTURE/COLONY COUNT: CPT | Performed by: NURSE PRACTITIONER

## 2018-08-18 PROCEDURE — 81003 URINALYSIS AUTO W/O SCOPE: CPT | Performed by: NURSE PRACTITIONER

## 2018-08-18 PROCEDURE — 99213 OFFICE O/P EST LOW 20 MIN: CPT | Performed by: NURSE PRACTITIONER

## 2018-08-18 RX ORDER — NITROFURANTOIN 25; 75 MG/1; MG/1
100 CAPSULE ORAL 2 TIMES DAILY
Qty: 14 CAPSULE | Refills: 0 | Status: SHIPPED | OUTPATIENT
Start: 2018-08-18 | End: 2018-08-20

## 2018-08-18 RX ORDER — NITROFURANTOIN 25; 75 MG/1; MG/1
100 CAPSULE ORAL 2 TIMES DAILY
Qty: 14 CAPSULE | Refills: 0 | Status: SHIPPED | OUTPATIENT
Start: 2018-08-18 | End: 2018-08-18

## 2018-08-18 RX ORDER — PHENAZOPYRIDINE HYDROCHLORIDE 200 MG/1
200 TABLET, FILM COATED ORAL 3 TIMES DAILY PRN
Qty: 10 TABLET | Refills: 0 | Status: SHIPPED | OUTPATIENT
Start: 2018-08-18 | End: 2018-08-20

## 2018-08-18 NOTE — PROGRESS NOTES
CHIEF COMPLAINT:   Patient presents with:  UTI: urgency, frequency, discomfort x today       HPI:   Philly Montes is a 59year old female who presents with symptoms of UTI. Complaining of urinary frequency, urgency, dysuria for 6 hours.   Symptoms have b Take 150 mg by mouth daily. Disp:  Rfl:    Fluticasone Propionate (FLONASE) 50 MCG/ACT Nasal Suspension 2 sprays by Each Nare route daily.  (Patient taking differently: 2 sprays by Each Nare route as needed.  ) Disp: 1 Bottle Rfl: 1   aspirin 81 MG Oral Tab hepatosplenomegaly. : slight suprapubic tenderness.  No bladder distention, or CVAT       Recent Results (from the past 24 hour(s))  -URINALYSIS, AUTO, W/O SCOPE   Collection Time: 08/18/18  4:30 PM   Result Value Ref Range   GLUCOSE (URINE DIPSTICK) neg immediately if you experience nausea, vomiting, or fever. What can you do to help prevent bladder infections? Urinate often during the day. You should also urinate after you have sex.    If you are a woman, it is important to:   Keep the area around you

## 2018-08-20 ENCOUNTER — OFFICE VISIT (OUTPATIENT)
Dept: FAMILY MEDICINE CLINIC | Facility: CLINIC | Age: 63
End: 2018-08-20
Payer: COMMERCIAL

## 2018-08-20 ENCOUNTER — TELEPHONE (OUTPATIENT)
Dept: FAMILY MEDICINE CLINIC | Facility: CLINIC | Age: 63
End: 2018-08-20

## 2018-08-20 VITALS
HEART RATE: 78 BPM | SYSTOLIC BLOOD PRESSURE: 126 MMHG | DIASTOLIC BLOOD PRESSURE: 86 MMHG | TEMPERATURE: 98 F | WEIGHT: 153 LBS | HEIGHT: 65 IN | RESPIRATION RATE: 16 BRPM | BODY MASS INDEX: 25.49 KG/M2

## 2018-08-20 DIAGNOSIS — N39.0 URINARY TRACT INFECTION WITHOUT HEMATURIA, SITE UNSPECIFIED: ICD-10-CM

## 2018-08-20 DIAGNOSIS — R30.0 DYSURIA: Primary | ICD-10-CM

## 2018-08-20 DIAGNOSIS — R11.0 NAUSEA: ICD-10-CM

## 2018-08-20 LAB
GLUCOSE (URINE DIPSTICK): 100 MG/DL
GLUCOSE BLOOD: 101
KETONES (URINE DIPSTICK): 15 MG/DL
MULTISTIX LOT#: ABNORMAL NUMERIC
PH, URINE: 5 (ref 4.5–8)
PROTEIN (URINE DIPSTICK): 30 MG/DL
SPECIFIC GRAVITY: 1.02 (ref 1–1.03)
TEST STRIP LOT #: NORMAL NUMERIC
UROBILINOGEN,SEMI-QN: 2 MG/DL (ref 0–1.9)

## 2018-08-20 PROCEDURE — 99213 OFFICE O/P EST LOW 20 MIN: CPT | Performed by: PHYSICIAN ASSISTANT

## 2018-08-20 PROCEDURE — 81003 URINALYSIS AUTO W/O SCOPE: CPT | Performed by: PHYSICIAN ASSISTANT

## 2018-08-20 PROCEDURE — 87086 URINE CULTURE/COLONY COUNT: CPT | Performed by: PHYSICIAN ASSISTANT

## 2018-08-20 PROCEDURE — 82962 GLUCOSE BLOOD TEST: CPT | Performed by: PHYSICIAN ASSISTANT

## 2018-08-20 RX ORDER — SULFAMETHOXAZOLE AND TRIMETHOPRIM 800; 160 MG/1; MG/1
1 TABLET ORAL 2 TIMES DAILY
Qty: 10 TABLET | Refills: 0 | Status: SHIPPED | OUTPATIENT
Start: 2018-08-20 | End: 2019-01-06

## 2018-08-20 RX ORDER — ONDANSETRON 4 MG/1
4 TABLET, ORALLY DISINTEGRATING ORAL EVERY 8 HOURS PRN
Qty: 10 TABLET | Refills: 0 | Status: SHIPPED | OUTPATIENT
Start: 2018-08-20

## 2018-08-20 NOTE — TELEPHONE ENCOUNTER
Patient went to a walk in clinic for UTI. They gave her a medication that is making her vomit. She said in the past she has been taking Bactrim and wanted to know if she could try that instead.

## 2018-08-20 NOTE — TELEPHONE ENCOUNTER
Pt given macrobid in UC for UTI, pt asking if this can be switched to Bactrim due to stomach issues? Please advise.

## 2018-08-20 NOTE — PROGRESS NOTES
HPI:   Azalia Collins is a 59year old female who presents with UTI symptoms. Pt was seen at the [facility] on 8/18/18. She was prescribed macrobid. Urine culture was negative for urinary tract infection. [facility] called and told her to stop the antibiotic today.  She Propionate (FLONASE) 50 MCG/ACT Nasal Suspension 2 sprays by Each Nare route daily. (Patient taking differently: 2 sprays by Each Nare route as needed.  ) Disp: 1 Bottle Rfl: 1   aspirin 81 MG Oral Tab Take 81 mg by mouth daily.  Disp:  Rfl:    Cholecalcife Father    • acute MI [OTHER] Father    • CHF [OTHER] Maternal Grandfather    • fallopian tube cancer [OTHER] Mother       Social History:   Smoking status: Former Smoker                                                              Packs/day: 0.25      Year Sulfamethoxazole-TMP -160 MG Oral Tab per tablet; Take 1 tablet by mouth 2 (two) times daily. Dispense: 10 tablet; Refill: 0    3. Nausea  zofran as needed for nausea. - ondansetron 4 MG Oral Tablet Dispersible;  Take 1 tablet (4 mg total) by mouth

## 2018-08-21 ENCOUNTER — OFFICE VISIT (OUTPATIENT)
Dept: RHEUMATOLOGY | Facility: CLINIC | Age: 63
End: 2018-08-21
Payer: COMMERCIAL

## 2018-08-21 VITALS
HEART RATE: 72 BPM | BODY MASS INDEX: 25.83 KG/M2 | HEIGHT: 65 IN | WEIGHT: 155 LBS | DIASTOLIC BLOOD PRESSURE: 84 MMHG | RESPIRATION RATE: 18 BRPM | SYSTOLIC BLOOD PRESSURE: 120 MMHG

## 2018-08-21 DIAGNOSIS — R76.8 POSITIVE ANA (ANTINUCLEAR ANTIBODY): ICD-10-CM

## 2018-08-21 DIAGNOSIS — M35.9 UNDIFFERENTIATED CONNECTIVE TISSUE DISEASE (HCC): Primary | ICD-10-CM

## 2018-08-21 PROCEDURE — 99213 OFFICE O/P EST LOW 20 MIN: CPT | Performed by: INTERNAL MEDICINE

## 2018-08-21 RX ORDER — ROPINIROLE 1 MG/1
1-2 TABLET, FILM COATED ORAL NIGHTLY
Qty: 50 TABLET | Refills: 3 | Status: SHIPPED | OUTPATIENT
Start: 2018-08-21 | End: 2019-10-25

## 2018-08-21 RX ORDER — HYDROXYCHLOROQUINE SULFATE 200 MG/1
200 TABLET, FILM COATED ORAL DAILY
Qty: 90 TABLET | Refills: 3 | Status: SHIPPED | OUTPATIENT
Start: 2018-08-21 | End: 2019-09-09

## 2018-08-21 NOTE — PROGRESS NOTES
[facility]  Dr. Carla Matias Progress Note     Subjective:   Barry Padilla is a(n) 59year old female. Current complaints: Patient presents with: Follow - Up: est pt, fu 1year, Pt states she has skin issues, and some sensitivity.  States Plaquenil has b

## 2018-08-21 NOTE — PATIENT INSTRUCTIONS
Plaquenil 200 mg per day for Lupus. Use Naproxen 500 mg twice a day as needed. Use Requip 1 - 2 mg at night for leg cramps. Exercise regularly. Well balanced diet. Return to office 6 months.

## 2019-01-04 ENCOUNTER — OFFICE VISIT (OUTPATIENT)
Dept: FAMILY MEDICINE CLINIC | Facility: CLINIC | Age: 64
End: 2019-01-04
Payer: COMMERCIAL

## 2019-01-04 VITALS
BODY MASS INDEX: 27 KG/M2 | SYSTOLIC BLOOD PRESSURE: 112 MMHG | TEMPERATURE: 99 F | DIASTOLIC BLOOD PRESSURE: 72 MMHG | RESPIRATION RATE: 16 BRPM | OXYGEN SATURATION: 99 % | HEART RATE: 69 BPM | WEIGHT: 159.38 LBS

## 2019-01-04 DIAGNOSIS — R35.0 FREQUENCY OF URINATION: Primary | ICD-10-CM

## 2019-01-04 DIAGNOSIS — N30.00 ACUTE CYSTITIS WITHOUT HEMATURIA: ICD-10-CM

## 2019-01-04 LAB
MULTISTIX EXPIRATION DATE: ABNORMAL DATE
MULTISTIX LOT#: ABNORMAL NUMERIC
PH, URINE: 5.5 (ref 4.5–8)
SPECIFIC GRAVITY: 1.03 (ref 1–1.03)
UROBILINOGEN,SEMI-QN: 0.2 MG/DL (ref 0–1.9)

## 2019-01-04 PROCEDURE — 81003 URINALYSIS AUTO W/O SCOPE: CPT | Performed by: NURSE PRACTITIONER

## 2019-01-04 PROCEDURE — 87086 URINE CULTURE/COLONY COUNT: CPT | Performed by: NURSE PRACTITIONER

## 2019-01-04 PROCEDURE — 99213 OFFICE O/P EST LOW 20 MIN: CPT | Performed by: NURSE PRACTITIONER

## 2019-01-04 RX ORDER — NITROFURANTOIN 25; 75 MG/1; MG/1
100 CAPSULE ORAL 2 TIMES DAILY
Qty: 10 CAPSULE | Refills: 0 | Status: SHIPPED | OUTPATIENT
Start: 2019-01-04 | End: 2019-01-06

## 2019-01-04 RX ORDER — PHENAZOPYRIDINE HYDROCHLORIDE 200 MG/1
200 TABLET, FILM COATED ORAL 3 TIMES DAILY PRN
Qty: 6 TABLET | Refills: 0 | Status: SHIPPED | OUTPATIENT
Start: 2019-01-04 | End: 2019-01-06

## 2019-01-04 NOTE — PROGRESS NOTES
CHIEF COMPLAINT:   Patient presents with:  UTI: Frequency urination, burning, blood in the urine, lower abdomen pain, urgency urinary x2days      HPI:   Krystal Mariscal is a 59year old female who presents with symptoms of UTI.  Complaining of urinary freq Cyclobenzaprine HCl 5 MG Oral Tab Take 1 tablet (5 mg total) by mouth 3 (three) times daily as needed for Muscle spasms. Disp: 20 tablet Rfl: 0   Levothyroxine Sodium 88 MCG Oral Tab Take 1 tablet (88 mcg total) by mouth once daily.  Disp: 90 tablet Rfl: 1 Smokeless tobacco: Never Used    Alcohol use: No      Alcohol/week: 0.0 oz      Comment: socially    Drug use: No        REVIEW OF SYSTEMS:   GENERAL: See above  SKIN: no rashes, no skin wounds or ulcers. GI: See HPI. : See HPI.   NEURO: no headac • Phenazopyridine HCl (PYRIDIUM) 200 MG Oral Tab 6 tablet 0     Sig: Take 1 tablet (200 mg total) by mouth 3 (three) times daily as needed for Pain. Risk and benefits of medication discussed.    Stressed importance of completing full course of antibio · Loss of appetite  · Confusion (in older adults)  Causes  Bladder infections are not contagious. You can't get one from someone else, from a toilet seat, or from sharing a bath. The most common cause of bladder infections is bacteria from the bowels.  The · Drink plenty of fluids to prevent dehydration and flush out your bladder. Do this unless you must restrict fluids for other health reasons, or your doctor told you not to. · Proper cleaning after going to the bathroom is important.  Wipe from front to ba © 7236-4440 [employer] [address] [facility], [facility] [place]. All rights reserved. This information is not intended as a substitute for professional medical care. Always follow your healthcare professional's instructions.

## 2019-01-06 ENCOUNTER — OFFICE VISIT (OUTPATIENT)
Dept: FAMILY MEDICINE CLINIC | Facility: CLINIC | Age: 64
End: 2019-01-06
Payer: COMMERCIAL

## 2019-01-06 VITALS
HEIGHT: 65 IN | BODY MASS INDEX: 26.49 KG/M2 | OXYGEN SATURATION: 97 % | SYSTOLIC BLOOD PRESSURE: 110 MMHG | WEIGHT: 159 LBS | HEART RATE: 86 BPM | DIASTOLIC BLOOD PRESSURE: 70 MMHG | RESPIRATION RATE: 16 BRPM | TEMPERATURE: 99 F

## 2019-01-06 DIAGNOSIS — K52.1 ANTIBIOTIC-ASSOCIATED DIARRHEA: Primary | ICD-10-CM

## 2019-01-06 DIAGNOSIS — T36.95XA ANTIBIOTIC-ASSOCIATED DIARRHEA: Primary | ICD-10-CM

## 2019-01-06 PROCEDURE — 99212 OFFICE O/P EST SF 10 MIN: CPT | Performed by: NURSE PRACTITIONER

## 2019-01-06 NOTE — PROGRESS NOTES
Deirdre Davis is a 59year old female who presents for diarrhea. HPI:   Pt presents with complains of diarrhea after starting Macrobid for UTI symptoms. She took 2 doses and discontinued the medication as soon as diarrhea started.  Watery, no blood, no MCG Oral Tab Take 1 tablet (88 mcg total) by mouth once daily. Disp: 90 tablet Rfl: 1   DiphenhydrAMINE HCl 50 MG Oral Tab Take diphenhydramine 50 mg along with third prednisone one hour before exam . Do not drive.  Disp: 1 tablet Rfl: 0   BIOTIN OR Take by Thyroid disease    • Wears glasses    • Weight gain       Past Surgical History:   Procedure Laterality Date   • APPENDECTOMY     • BREAST SURGERY PROCEDURE UNLISTED      lumpectomy   • HYSTERECTOMY     • TAISHA NEEDLE LOCALIZATION W/ SPECIMEN 1 SITE LEFT  19 mucosa. EYES:PERRLA, EOMI, sclera not icteric. NECK: supple,no adenopathy  LUNGS: clear to auscultation  CARDIO: RRR without murmur  GI: good BS's,no masses, HSM, mild diffuse tenderness, Martinez negative, no guarding, no Psoas sign. No hernias.   EXTREMIT

## 2019-01-08 ENCOUNTER — OFFICE VISIT (OUTPATIENT)
Dept: FAMILY MEDICINE CLINIC | Facility: CLINIC | Age: 64
End: 2019-01-08
Payer: COMMERCIAL

## 2019-01-08 VITALS
SYSTOLIC BLOOD PRESSURE: 118 MMHG | HEIGHT: 65 IN | WEIGHT: 152 LBS | BODY MASS INDEX: 25.33 KG/M2 | RESPIRATION RATE: 16 BRPM | DIASTOLIC BLOOD PRESSURE: 78 MMHG | TEMPERATURE: 98 F | HEART RATE: 84 BPM

## 2019-01-08 DIAGNOSIS — R31.9 HEMATURIA, UNSPECIFIED TYPE: Primary | ICD-10-CM

## 2019-01-08 DIAGNOSIS — R39.9 UTI SYMPTOMS: ICD-10-CM

## 2019-01-08 LAB
APPEARANCE: CLEAR
GLUCOSE (URINE DIPSTICK): 250 MG/DL
KETONES (URINE DIPSTICK): 15 MG/DL
MULTISTIX LOT#: ABNORMAL NUMERIC
NITRITE, URINE: POSITIVE
OCCULT BLOOD: NEGATIVE
PH, URINE: 5 (ref 4.5–8)
PROTEIN (URINE DIPSTICK): 100 MG/DL
SPECIFIC GRAVITY: 1.03 (ref 1–1.03)
UROBILINOGEN,SEMI-QN: 8 MG/DL (ref 0–1.9)

## 2019-01-08 PROCEDURE — 99213 OFFICE O/P EST LOW 20 MIN: CPT | Performed by: FAMILY MEDICINE

## 2019-01-08 PROCEDURE — 87086 URINE CULTURE/COLONY COUNT: CPT | Performed by: FAMILY MEDICINE

## 2019-01-08 PROCEDURE — 81003 URINALYSIS AUTO W/O SCOPE: CPT | Performed by: FAMILY MEDICINE

## 2019-01-08 RX ORDER — SULFAMETHOXAZOLE AND TRIMETHOPRIM 800; 160 MG/1; MG/1
1 TABLET ORAL 2 TIMES DAILY
Qty: 10 TABLET | Refills: 0 | Status: SHIPPED | OUTPATIENT
Start: 2019-01-08 | End: 2019-02-25 | Stop reason: ALTCHOICE

## 2019-01-08 NOTE — PROGRESS NOTES
HPI:   Inés Salas is a 59year old female here to discuss UTI symptoms     Pt was seen at the [facility] and culture was negative   Pt saw gross blood  + dysuria, + urgency and frequency     Last 3 cultures neg  subjective fever   + malaise       Current Outp mouth daily. Disp:  Rfl:    VITAMIN E Take 400 mg by mouth daily. Disp:  Rfl:    Calcium Carbonate (CALTRATE 600 OR) Take  by mouth.  Disp:  Rfl:    DiphenhydrAMINE HCl 50 MG Oral Tab Take diphenhydramine 50 mg along with third prednisone one hour befor 0.25        Types: Cigarettes        Quit date: 5/7/2015        Years since quitting: 3.6      Smokeless tobacco: Never Used    Alcohol use: No      Alcohol/week: 0.0 oz      Comment: socially    Drug use: No    Occ: . : 2.  Children: .  homemaker  E - INTERNAL      Questions answered and patient indicates understanding of these issues and agrees to the plan. Follow up in 1-2 weeks sooner if needed.

## 2019-02-01 DIAGNOSIS — E03.9 HYPOTHYROIDISM, UNSPECIFIED TYPE: ICD-10-CM

## 2019-02-01 RX ORDER — LEVOTHYROXINE SODIUM 88 UG/1
88 TABLET ORAL
Qty: 90 TABLET | Refills: 1 | Status: SHIPPED | OUTPATIENT
Start: 2019-02-01 | End: 2019-10-25

## 2019-02-17 DIAGNOSIS — G47.09 OTHER INSOMNIA: ICD-10-CM

## 2019-02-19 RX ORDER — AMITRIPTYLINE HYDROCHLORIDE 25 MG/1
TABLET, FILM COATED ORAL
Qty: 90 TABLET | Refills: 0 | Status: SHIPPED | OUTPATIENT
Start: 2019-02-19 | End: 2019-02-25

## 2019-02-25 ENCOUNTER — OFFICE VISIT (OUTPATIENT)
Dept: RHEUMATOLOGY | Facility: CLINIC | Age: 64
End: 2019-02-25
Payer: COMMERCIAL

## 2019-02-25 VITALS
WEIGHT: 153 LBS | SYSTOLIC BLOOD PRESSURE: 142 MMHG | HEIGHT: 65 IN | HEART RATE: 74 BPM | DIASTOLIC BLOOD PRESSURE: 82 MMHG | RESPIRATION RATE: 16 BRPM | BODY MASS INDEX: 25.49 KG/M2

## 2019-02-25 DIAGNOSIS — R76.8 POSITIVE ANA (ANTINUCLEAR ANTIBODY): ICD-10-CM

## 2019-02-25 DIAGNOSIS — M35.9 UNDIFFERENTIATED CONNECTIVE TISSUE DISEASE (HCC): Primary | ICD-10-CM

## 2019-02-25 DIAGNOSIS — G47.09 OTHER INSOMNIA: ICD-10-CM

## 2019-02-25 PROCEDURE — 99213 OFFICE O/P EST LOW 20 MIN: CPT | Performed by: INTERNAL MEDICINE

## 2019-02-25 RX ORDER — AMITRIPTYLINE HYDROCHLORIDE 25 MG/1
TABLET, FILM COATED ORAL
Qty: 90 TABLET | Refills: 0 | Status: SHIPPED | OUTPATIENT
Start: 2019-02-25 | End: 2019-07-01

## 2019-02-25 NOTE — PROGRESS NOTES
[facility]  Dr. Irene Dudley Progress Note     Subjective:   Cris Brown is a(n) 59year old female. Current complaints: Patient presents with: Other: Undifferentiated connective tissue disease. 6 month f/u.  Pt continues with sensitivty skin especial

## 2019-02-25 NOTE — PATIENT INSTRUCTIONS
Plaquenil 200 mg per day for Lupus. Use Naproxen 500 mg twice a day as needed. Use Requip needed. Exercise regularly. Use amitriptolyine 25 mg as needed night. Return to office 6 months.

## 2019-03-07 ENCOUNTER — OFFICE VISIT (OUTPATIENT)
Dept: FAMILY MEDICINE CLINIC | Facility: CLINIC | Age: 64
End: 2019-03-07
Payer: COMMERCIAL

## 2019-03-07 VITALS
BODY MASS INDEX: 25.85 KG/M2 | WEIGHT: 155.13 LBS | SYSTOLIC BLOOD PRESSURE: 128 MMHG | HEIGHT: 65 IN | RESPIRATION RATE: 16 BRPM | TEMPERATURE: 98 F | DIASTOLIC BLOOD PRESSURE: 70 MMHG | OXYGEN SATURATION: 98 % | HEART RATE: 67 BPM

## 2019-03-07 DIAGNOSIS — R15.9 INCONTINENCE OF FECES, UNSPECIFIED FECAL INCONTINENCE TYPE: ICD-10-CM

## 2019-03-07 DIAGNOSIS — Z12.11 COLON CANCER SCREENING: ICD-10-CM

## 2019-03-07 DIAGNOSIS — R10.9 ABDOMINAL PAIN, UNSPECIFIED ABDOMINAL LOCATION: Primary | ICD-10-CM

## 2019-03-07 DIAGNOSIS — R10.13 EPIGASTRIC PAIN: ICD-10-CM

## 2019-03-07 LAB
APPEARANCE: CLEAR
MULTISTIX LOT#: NORMAL NUMERIC
PH, URINE: 6 (ref 4.5–8)
SPECIFIC GRAVITY: 1.02 (ref 1–1.03)
URINE-COLOR: YELLOW
UROBILINOGEN,SEMI-QN: 1 MG/DL (ref 0–1.9)

## 2019-03-07 PROCEDURE — 87086 URINE CULTURE/COLONY COUNT: CPT | Performed by: FAMILY MEDICINE

## 2019-03-07 PROCEDURE — 99214 OFFICE O/P EST MOD 30 MIN: CPT | Performed by: FAMILY MEDICINE

## 2019-03-07 PROCEDURE — 87147 CULTURE TYPE IMMUNOLOGIC: CPT | Performed by: FAMILY MEDICINE

## 2019-03-07 PROCEDURE — 81003 URINALYSIS AUTO W/O SCOPE: CPT | Performed by: FAMILY MEDICINE

## 2019-03-07 RX ORDER — OMEPRAZOLE 40 MG/1
40 CAPSULE, DELAYED RELEASE ORAL DAILY
Qty: 30 CAPSULE | Refills: 2 | Status: SHIPPED | OUTPATIENT
Start: 2019-03-07 | End: 2019-05-30

## 2019-03-07 NOTE — PROGRESS NOTES
HPI:   Leigha Sheikh is a 59year old female here to discuss abdominal symptoms     Epigastric pain for 5 weeks ; fullness / pressure  Radiating to the around to the left and right 2-3/10   No dysuria  Mixed diarrhea and constipation   Some GERD  Pt takes total) by mouth 2 (two) times daily with meals.  (Patient taking differently: Take 500 mg by mouth 2 (two) times daily as needed.  ) Disp: 180 tablet Rfl: 3   DiphenhydrAMINE HCl 50 MG Oral Tab Take diphenhydramine 50 mg along with third prednisone one hour • OTHER SURGICAL HISTORY      gallbladder   • OTHER SURGICAL HISTORY      thyroidectomy   • TONSILLECTOMY      adenoidectomy   • TOTAL ABDOM HYSTERECTOMY        Family History   Problem Relation Age of Onset   • Cancer Maternal Grandmother    • Breast Ca flank pain   EXTREMITIES: no cyanosis, clubbing or edema  NEURO: cranial nerves are intact,motor and sensory are grossly intac    ASSESSMENT AND PLAN:   Krystal Mariscal is a 59year old female who presents with     1.  Abdominal pain, unspecified abdominal l

## 2019-03-09 ENCOUNTER — TELEPHONE (OUTPATIENT)
Dept: FAMILY MEDICINE CLINIC | Facility: CLINIC | Age: 64
End: 2019-03-09

## 2019-03-09 RX ORDER — AMOXICILLIN 875 MG/1
875 TABLET, COATED ORAL 2 TIMES DAILY
Qty: 14 TABLET | Refills: 0 | Status: SHIPPED | OUTPATIENT
Start: 2019-03-09 | End: 2019-03-16

## 2019-03-13 ENCOUNTER — HOSPITAL ENCOUNTER (OUTPATIENT)
Dept: ULTRASOUND IMAGING | Age: 64
Discharge: HOME | End: 2019-03-13
Attending: FAMILY MEDICINE
Payer: COMMERCIAL

## 2019-03-13 DIAGNOSIS — R10.13 EPIGASTRIC PAIN: ICD-10-CM

## 2019-03-13 PROCEDURE — 76700 US EXAM ABDOM COMPLETE: CPT | Performed by: FAMILY MEDICINE

## 2019-03-20 ENCOUNTER — TELEPHONE (OUTPATIENT)
Dept: FAMILY MEDICINE CLINIC | Facility: CLINIC | Age: 64
End: 2019-03-20

## 2019-03-20 DIAGNOSIS — R79.9 ABNORMAL BLOOD CHEMISTRY: Primary | ICD-10-CM

## 2019-03-20 NOTE — TELEPHONE ENCOUNTER
Pt stated Dr. Rolando Banda mentioned she wanted pt to do labs on her last visit, however pt did not see the labs on her AVS paperwork, pt is requesting to ask Dr. Rolando Banda if pt should do labs, pt also wants to know what is EGD? Please call and advise.

## 2019-03-22 NOTE — TELEPHONE ENCOUNTER
Orders for lab work have been placed in [facility] EMR  Patient is aware   Task is complete.

## 2019-03-27 ENCOUNTER — LAB ENCOUNTER (OUTPATIENT)
Dept: LAB | Age: 64
End: 2019-03-27
Attending: FAMILY MEDICINE
Payer: COMMERCIAL

## 2019-03-27 DIAGNOSIS — R79.9 ABNORMAL BLOOD CHEMISTRY: ICD-10-CM

## 2019-03-27 LAB
ALBUMIN SERPL-MCNC: 3.7 G/DL (ref 3.4–5)
ALBUMIN/GLOB SERPL: 1.1 {RATIO} (ref 1–2)
ALP LIVER SERPL-CCNC: 110 U/L (ref 50–130)
ALT SERPL-CCNC: 20 U/L (ref 13–56)
ANION GAP SERPL CALC-SCNC: 7 MMOL/L (ref 0–18)
AST SERPL-CCNC: 21 U/L (ref 15–37)
BASOPHILS # BLD AUTO: 0.04 X10(3) UL (ref 0–0.2)
BASOPHILS NFR BLD AUTO: 0.4 %
BILIRUB SERPL-MCNC: 0.4 MG/DL (ref 0.1–2)
BUN BLD-MCNC: 19 MG/DL (ref 7–18)
BUN/CREAT SERPL: 18.8 (ref 10–20)
CALCIUM BLD-MCNC: 8.5 MG/DL (ref 8.5–10.1)
CHLORIDE SERPL-SCNC: 109 MMOL/L (ref 98–107)
CO2 SERPL-SCNC: 26 MMOL/L (ref 21–32)
CREAT BLD-MCNC: 1.01 MG/DL (ref 0.55–1.02)
DEPRECATED RDW RBC AUTO: 47.3 FL (ref 35.1–46.3)
EOSINOPHIL # BLD AUTO: 0.18 X10(3) UL (ref 0–0.7)
EOSINOPHIL NFR BLD AUTO: 1.8 %
ERYTHROCYTE [DISTWIDTH] IN BLOOD BY AUTOMATED COUNT: 14.1 % (ref 11–15)
GLOBULIN PLAS-MCNC: 3.5 G/DL (ref 2.8–4.4)
GLUCOSE BLD-MCNC: 95 MG/DL (ref 70–99)
HCT VFR BLD AUTO: 42.5 % (ref 35–48)
HGB BLD-MCNC: 13.4 G/DL (ref 12–16)
IMM GRANULOCYTES # BLD AUTO: 0.03 X10(3) UL (ref 0–1)
IMM GRANULOCYTES NFR BLD: 0.3 %
LIPASE SERPL-CCNC: 179 U/L (ref 73–393)
LYMPHOCYTES # BLD AUTO: 1.4 X10(3) UL (ref 1–4)
LYMPHOCYTES NFR BLD AUTO: 14 %
M PROTEIN MFR SERPL ELPH: 7.2 G/DL (ref 6.4–8.2)
MCH RBC QN AUTO: 28.9 PG (ref 26–34)
MCHC RBC AUTO-ENTMCNC: 31.5 G/DL (ref 31–37)
MCV RBC AUTO: 91.8 FL (ref 80–100)
MONOCYTES # BLD AUTO: 0.69 X10(3) UL (ref 0.1–1)
MONOCYTES NFR BLD AUTO: 6.9 %
NEUTROPHILS # BLD AUTO: 7.64 X10 (3) UL (ref 1.5–7.7)
NEUTROPHILS # BLD AUTO: 7.64 X10(3) UL (ref 1.5–7.7)
NEUTROPHILS NFR BLD AUTO: 76.6 %
OSMOLALITY SERPL CALC.SUM OF ELEC: 296 MOSM/KG (ref 275–295)
PLATELET # BLD AUTO: 243 10(3)UL (ref 150–450)
POTASSIUM SERPL-SCNC: 4.7 MMOL/L (ref 3.5–5.1)
RBC # BLD AUTO: 4.63 X10(6)UL (ref 3.8–5.3)
SODIUM SERPL-SCNC: 142 MMOL/L (ref 136–145)
WBC # BLD AUTO: 10 X10(3) UL (ref 4–11)

## 2019-03-27 PROCEDURE — 85025 COMPLETE CBC W/AUTO DIFF WBC: CPT

## 2019-03-27 PROCEDURE — 80053 COMPREHEN METABOLIC PANEL: CPT

## 2019-03-27 PROCEDURE — 83690 ASSAY OF LIPASE: CPT

## 2019-03-27 PROCEDURE — 36415 COLL VENOUS BLD VENIPUNCTURE: CPT

## 2019-03-28 ENCOUNTER — OFFICE VISIT (OUTPATIENT)
Dept: FAMILY MEDICINE CLINIC | Facility: CLINIC | Age: 64
End: 2019-03-28
Payer: COMMERCIAL

## 2019-03-28 VITALS
HEIGHT: 65 IN | RESPIRATION RATE: 20 BRPM | WEIGHT: 154 LBS | SYSTOLIC BLOOD PRESSURE: 120 MMHG | HEART RATE: 82 BPM | OXYGEN SATURATION: 98 % | TEMPERATURE: 98 F | DIASTOLIC BLOOD PRESSURE: 80 MMHG | BODY MASS INDEX: 25.66 KG/M2

## 2019-03-28 DIAGNOSIS — R10.32 LEFT LOWER QUADRANT ABDOMINAL PAIN OF UNKNOWN ETIOLOGY: Primary | ICD-10-CM

## 2019-03-28 DIAGNOSIS — R10.2 PELVIC PAIN: ICD-10-CM

## 2019-03-28 DIAGNOSIS — R50.9 FEVER, UNSPECIFIED FEVER CAUSE: ICD-10-CM

## 2019-03-28 LAB
MULTISTIX LOT#: NORMAL NUMERIC
PH, URINE: 6 (ref 4.5–8)
SPECIFIC GRAVITY: 1.02 (ref 1–1.03)
URINE-COLOR: YELLOW

## 2019-03-28 PROCEDURE — 99214 OFFICE O/P EST MOD 30 MIN: CPT | Performed by: INTERNAL MEDICINE

## 2019-03-28 PROCEDURE — 81003 URINALYSIS AUTO W/O SCOPE: CPT | Performed by: INTERNAL MEDICINE

## 2019-03-28 PROCEDURE — 87086 URINE CULTURE/COLONY COUNT: CPT | Performed by: INTERNAL MEDICINE

## 2019-03-28 RX ORDER — PREDNISONE 50 MG/1
TABLET ORAL
Qty: 3 TABLET | Refills: 0 | Status: SHIPPED | OUTPATIENT
Start: 2019-03-28 | End: 2019-04-18

## 2019-03-28 RX ORDER — HYDROCODONE BITARTRATE AND ACETAMINOPHEN 5; 325 MG/1; MG/1
1 TABLET ORAL EVERY 4 HOURS PRN
Qty: 10 TABLET | Refills: 0 | Status: SHIPPED | OUTPATIENT
Start: 2019-03-28 | End: 2019-04-18

## 2019-03-28 NOTE — PATIENT INSTRUCTIONS
TAKE A PREDNISONE 50MG TABLET 13 HOURS BEFORE, 7 HOURS BEFORE, AND 1 HOUR BEFORE TAKING CONTRAST. ALSO TAKE BENADRYL 50MG 1 HOUR PRIOR TO CONTRAST.

## 2019-03-28 NOTE — PROGRESS NOTES
Dheeraj Frausto is a 62year old female who presents with gradual onset of left lower abdominal pain. Started March 9th. Pain is located at [facility]. Pain is described as aching, sharp. Severity is severe, 7 out of 10 pain.  Associated symptoms: nausea, fever Injection Solution Auto-injector Inject into skin as needed for severe allergic reaction Disp: 1 each Rfl: 0   simvastatin 20 MG Oral Tab TAKE 1 TABLET BY MOUTH EVERY DAY AT BEDTIME Disp: 90 tablet Rfl: 1   Metoprolol Succinate ER 50 MG Oral Tablet 24 Hr T APPENDECTOMY     • BREAST SURGERY PROCEDURE UNLISTED      lumpectomy   • HYSTERECTOMY     • TAISHA NEEDLE LOCALIZATION W/ SPECIMEN 1 SITE LEFT  1999    ADH   • TAISHA NEEDLE LOCALIZATION W/ SPECIMEN 1 SITE RIGHT  5845,1223   • NEEDLE BIOPSY RIGHT  2012    US herb clear to auscultation, easy breathing  CV: S1 S2, RRR without murmur  GI: active bowel sounds; no masses; no HSM; + LLL tenderness near where adnexa would be; no rebound or rigidity; + some guarding; no hernia on exam; negative guaiac stool  EXT: no edema

## 2019-03-29 ENCOUNTER — TELEPHONE (OUTPATIENT)
Dept: FAMILY MEDICINE CLINIC | Facility: CLINIC | Age: 64
End: 2019-03-29

## 2019-03-29 ENCOUNTER — HOSPITAL ENCOUNTER (OUTPATIENT)
Dept: CT IMAGING | Age: 64
Discharge: HOME | End: 2019-03-29
Attending: INTERNAL MEDICINE
Payer: COMMERCIAL

## 2019-03-29 DIAGNOSIS — R50.9 FEVER, UNSPECIFIED FEVER CAUSE: ICD-10-CM

## 2019-03-29 DIAGNOSIS — R10.32 LEFT LOWER QUADRANT ABDOMINAL PAIN OF UNKNOWN ETIOLOGY: ICD-10-CM

## 2019-03-29 PROCEDURE — 74177 CT ABD & PELVIS W/CONTRAST: CPT | Performed by: INTERNAL MEDICINE

## 2019-03-29 RX ORDER — METRONIDAZOLE 500 MG/1
500 TABLET ORAL 3 TIMES DAILY
Qty: 30 TABLET | Refills: 0 | Status: SHIPPED | OUTPATIENT
Start: 2019-03-29 | End: 2019-03-30

## 2019-03-29 RX ORDER — CIPROFLOXACIN 750 MG/1
750 TABLET, FILM COATED ORAL 2 TIMES DAILY
Qty: 20 TABLET | Refills: 0 | Status: SHIPPED | OUTPATIENT
Start: 2019-03-29 | End: 2019-03-30

## 2019-03-29 NOTE — TELEPHONE ENCOUNTER
Ok to change to Levaquin but Im out of the office and don't have my reference guide with me. Ask the on-call doc if the dose is 750mg or 500mg daily x 10 days for diverticulitis.   Thanks

## 2019-03-29 NOTE — TELEPHONE ENCOUNTER
Dr Fields Flight states Levaquin is the same class as Cipro and will also cause QT prolongation. He recommends Ceftin 500mg BID x 7 days and Flagyl 500mg TID x 7 days. Please advise

## 2019-03-29 NOTE — TELEPHONE ENCOUNTER
Pharmacy called to say Cipro that was sent over, has a reaction with Hydroxychroqine. It can cause QT Prolongation. Does [facility] still want this pt to still have the Cipro filled?  Or send over another medication

## 2019-03-30 RX ORDER — METRONIDAZOLE 500 MG/1
500 TABLET ORAL 3 TIMES DAILY
Qty: 21 TABLET | Refills: 0 | Status: SHIPPED | OUTPATIENT
Start: 2019-03-30 | End: 2019-04-06

## 2019-03-30 RX ORDER — CEFUROXIME AXETIL 500 MG/1
500 TABLET ORAL 2 TIMES DAILY
Qty: 14 TABLET | Refills: 0 | Status: SHIPPED | OUTPATIENT
Start: 2019-03-30 | End: 2019-04-06

## 2019-04-02 ENCOUNTER — OFFICE VISIT (OUTPATIENT)
Dept: FAMILY MEDICINE CLINIC | Facility: CLINIC | Age: 64
End: 2019-04-02
Payer: COMMERCIAL

## 2019-04-02 ENCOUNTER — OFFICE VISIT (OUTPATIENT)
Dept: SURGERY | Facility: CLINIC | Age: 64
End: 2019-04-02
Payer: COMMERCIAL

## 2019-04-02 VITALS — BODY MASS INDEX: 26.98 KG/M2 | WEIGHT: 158 LBS | HEIGHT: 64 IN

## 2019-04-02 VITALS
OXYGEN SATURATION: 97 % | HEART RATE: 74 BPM | WEIGHT: 158 LBS | SYSTOLIC BLOOD PRESSURE: 118 MMHG | TEMPERATURE: 98 F | RESPIRATION RATE: 16 BRPM | BODY MASS INDEX: 26.98 KG/M2 | DIASTOLIC BLOOD PRESSURE: 74 MMHG | HEIGHT: 64 IN

## 2019-04-02 DIAGNOSIS — K59.00 CONSTIPATION, UNSPECIFIED CONSTIPATION TYPE: ICD-10-CM

## 2019-04-02 DIAGNOSIS — K57.92 DIVERTICULITIS: Primary | ICD-10-CM

## 2019-04-02 DIAGNOSIS — K57.32 DIVERTICULITIS OF LARGE INTESTINE WITHOUT PERFORATION OR ABSCESS WITHOUT BLEEDING: Primary | ICD-10-CM

## 2019-04-02 DIAGNOSIS — R19.8 IRREGULAR BOWEL HABITS: ICD-10-CM

## 2019-04-02 PROCEDURE — 99244 OFF/OP CNSLTJ NEW/EST MOD 40: CPT | Performed by: COLON & RECTAL SURGERY

## 2019-04-02 PROCEDURE — 99213 OFFICE O/P EST LOW 20 MIN: CPT | Performed by: FAMILY MEDICINE

## 2019-04-02 RX ORDER — POLYETHYLENE GLYCOL 3350, SODIUM CHLORIDE, SODIUM BICARBONATE, POTASSIUM CHLORIDE 420; 11.2; 5.72; 1.48 G/4L; G/4L; G/4L; G/4L
POWDER, FOR SOLUTION ORAL
Qty: 1 BOTTLE | Refills: 0 | Status: CANCELLED | OUTPATIENT
Start: 2019-04-02

## 2019-04-02 NOTE — PROGRESS NOTES
HPI:   Leigha Sheikh is a 62year old female here to discuss diverticulitis     Pt having a hard time with the treatment   Will see surgery today     Ct Abdomen+pelvis(contrast Only)(cpt=74177)    Result Date: 3/29/2019  CONCLUSION:  1.  There is severe di hours as needed for Nausea.  Disp: 10 tablet Rfl: 0   EPINEPHrine (EPIPEN) 0.3 MG/0.3ML Injection Solution Auto-injector Inject into skin as needed for severe allergic reaction Disp: 1 each Rfl: 0   simvastatin 20 MG Oral Tab TAKE 1 TABLET BY MOUTH EVERY DA unspecified    • Fatigue    • Headache disorder    • Insomnia, unspecified    • Irregular bowel habits    • Leaking of urine    • Leg swelling    • Neuralgia august 2015   • Skin blushing/flushing    • Stress    • Thyroid disease    • Wears glasses    • We back pain  NEURO: denies headaches  PSYCHE: denies depression or anxiety  HEMATOLOGIC: denies hx of anemia  ALL/ASTHMA: denies  asthma    EXAM:   /74   Pulse 74   Temp 98.2 °F (36.8 °C) (Oral)   Resp 16   Ht 64\"   Wt 158 lb   SpO2 97%   BMI 27.12 kg

## 2019-04-12 NOTE — PATIENT INSTRUCTIONS
Assessment   Diverticulitis of large intestine without perforation or abscess without bleeding  (primary encounter diagnosis)  Irregular bowel habits  Constipation, unspecified constipation type      Plan   Patient has acute uncomplicated diverticulitis.

## 2019-04-12 NOTE — H&P
New Patient Visit Note       Active Problems      1. Diverticulitis of large intestine without perforation or abscess without bleeding    2. Irregular bowel habits    3.  Constipation, unspecified constipation type        Chief Complaint   Patient presents for goiter, a connective tissue disorder. The patient's past surgical history includes appendectomy, hysterectomy cholecystectomy, thyroidectomy, and thoracotomy for a lung lesion    The patient does take blood thinning medications.   She takes a baby as • Cancer Paternal Grandmother    • Hypertension Father    • Other (acute MI) Father    • Other (CHF) Maternal Grandfather    • Other (fallopian tube cancer) Mother      Social History    Tobacco Use      Smoking status: Former Smoker        Packs/day: 0. (ZANTAC) 150 MG Oral Tab Take 150 mg by mouth daily. Fluticasone Propionate (FLONASE) 50 MCG/ACT Nasal Suspension 2 sprays by Each Nare route daily.  (Patient taking differently: 2 sprays by Each Nare route as needed.  )   aspirin 81 MG Oral Tab Take 81 m icterus. Neck: Normal range of motion. Cardiovascular: Normal rate and regular rhythm. Pulmonary/Chest: Breath sounds normal. No respiratory distress. She has no wheezes. Abdominal:   Soft, nondistended, nontympanitic.   No palpable masses or hernia symphysis with non-ionic intravenous contrast material. Post contrast coronal MPR imaging was performed. Dose reduction techniques were used.  Dose information is   transmitted to the [facility]) Carlos Alberot. Emili Chamberlain [address] ([address] degenerative disc disease lower thoracic spine. No fracture or bone destruction. LUNG BASES:  No visible pulmonary or pleural disease. OTHER:  Negative.       =====  CONCLUSION:    1.  There is severe diverticulosis of the sigmoid colon with a a short the defined types were placed in this encounter.         Zeny Esparza MD

## 2019-04-16 DIAGNOSIS — I10 ESSENTIAL HYPERTENSION: ICD-10-CM

## 2019-04-16 RX ORDER — METOPROLOL SUCCINATE 50 MG/1
TABLET, EXTENDED RELEASE ORAL
Qty: 90 TABLET | Refills: 1 | Status: SHIPPED | OUTPATIENT
Start: 2019-04-16 | End: 2019-10-24

## 2019-04-18 ENCOUNTER — OFFICE VISIT (OUTPATIENT)
Dept: SURGERY | Facility: CLINIC | Age: 64
End: 2019-04-18
Payer: COMMERCIAL

## 2019-04-18 VITALS
BODY MASS INDEX: 25.44 KG/M2 | WEIGHT: 149 LBS | DIASTOLIC BLOOD PRESSURE: 75 MMHG | TEMPERATURE: 98 F | HEART RATE: 69 BPM | HEIGHT: 64 IN | SYSTOLIC BLOOD PRESSURE: 112 MMHG

## 2019-04-18 DIAGNOSIS — R76.8 POSITIVE ANA (ANTINUCLEAR ANTIBODY): ICD-10-CM

## 2019-04-18 DIAGNOSIS — K57.32 DIVERTICULITIS OF LARGE INTESTINE WITHOUT PERFORATION OR ABSCESS WITHOUT BLEEDING: Primary | ICD-10-CM

## 2019-04-18 DIAGNOSIS — M35.9 UNDIFFERENTIATED CONNECTIVE TISSUE DISEASE (HCC): ICD-10-CM

## 2019-04-18 DIAGNOSIS — R19.8 IRREGULAR BOWEL HABITS: ICD-10-CM

## 2019-04-18 DIAGNOSIS — K59.00 CONSTIPATION, UNSPECIFIED CONSTIPATION TYPE: ICD-10-CM

## 2019-04-18 PROCEDURE — 99213 OFFICE O/P EST LOW 20 MIN: CPT | Performed by: COLON & RECTAL SURGERY

## 2019-04-18 NOTE — PATIENT INSTRUCTIONS
Assessment   Diverticulitis of large intestine without perforation or abscess without bleeding  (primary encounter diagnosis)  Irregular bowel habits  Constipation, unspecified constipation type  Undifferentiated connective tissue disease ([facility] [address]  Positive

## 2019-04-18 NOTE — PROGRESS NOTES
Office Visit Note       Active Problems      1. Diverticulitis of large intestine without perforation or abscess without bleeding    2. Irregular bowel habits    3. Constipation, unspecified constipation type    4.  Undifferentiated connective tissue diseas History:   Procedure Laterality Date   • APPENDECTOMY     • BREAST SURGERY PROCEDURE UNLISTED      lumpectomy   • HYSTERECTOMY     • TAISHA NEEDLE LOCALIZATION W/ SPECIMEN 1 SITE LEFT  1999    ADH   • TAISHA NEEDLE LOCALIZATION W/ SPECIMEN 1 SITE RIGHT  8962,791 8 (eight) hours as needed for Nausea.    EPINEPHrine (EPIPEN) 0.3 MG/0.3ML Injection Solution Auto-injector Inject into skin as needed for severe allergic reaction   simvastatin 20 MG Oral Tab TAKE 1 TABLET BY MOUTH EVERY DAY AT BEDTIME   Cyclobenzaprine HC Musculoskeletal: Negative for arthralgias and myalgias. Skin: Negative for rash and wound. Neurological: Negative for dizziness, weakness and headaches. Hematological: Does not bruise/bleed easily.    Psychiatric/Behavioral: Negative for agitation a blood in the stools. Patient can increase her MiraLAX to twice daily. It is okay to bring some fiber back in the diet, continue to avoid raw vegetables. We will plan for colonoscopy in about 3 weeks.     The benefits of colonoscopy, including detecti

## 2019-04-24 ENCOUNTER — TELEPHONE (OUTPATIENT)
Dept: SURGERY | Facility: CLINIC | Age: 64
End: 2019-04-24

## 2019-04-24 NOTE — TELEPHONE ENCOUNTER
Ok to be off asa for 10 days before procedure  She can follow up here to discuss whether or not we will restart

## 2019-04-24 NOTE — TELEPHONE ENCOUNTER
Patient scheduled for colonoscopy on 05/7 with Dr. Dax Francis. Dr. Dax Francis is requesting aspirin management for the procedure. Please advise on when patient can stop and resume aspirin.

## 2019-04-28 NOTE — TELEPHONE ENCOUNTER
Correct rx's sent to pharmacy - patient informed. Patient's EF (Ejection Fraction) is KATHY    Patient has a past medical history of Arthritis, Asthma, Carpal tunnel syndrome, bilateral, Diabetes mellitus ([address] GERD (gastroesophageal reflux disease), Hyperlipidemia, Hypertension, and Thyroid disease. Comorbidities reviewed and education provided. Patient and family's stated goal of care: reduce shortness of breath prior to discharge    Patient's current functional capacity:  No limitation of physical activity. Ordinary physical activity does not cause undue fatigue, palpitation, dyspnea. Pt resting in bed at this time on room air. Pt denies shortness of breath. Pt without lower extremity edema.  Patient's weights and intake/output reviewed:    Patient Vitals for the past 96 hrs (Last 3 readings):   Weight   04/28/19 0700 161 lb 6.4 oz (73.2 kg)   04/27/19 1225 166 lb 3.2 oz (75.4 kg)   04/27/19 0822 160 lb (72.6 kg)       Intake/Output Summary (Last 24 hours) at 4/28/2019 0725  Last data filed at 4/28/2019 9726  Gross per 24 hour   Intake 960 ml   Output 2500 ml   Net -1540 ml         >>For CHF and Comorbidity documentation on Education Time and Topics, please see Education Tab

## 2019-05-07 PROCEDURE — 88305 TISSUE EXAM BY PATHOLOGIST: CPT | Performed by: COLON & RECTAL SURGERY

## 2019-05-08 ENCOUNTER — LAB REQUISITION (OUTPATIENT)
Dept: LAB | Facility: HOSPITAL | Age: 64
End: 2019-05-08
Payer: COMMERCIAL

## 2019-05-08 DIAGNOSIS — Z00.00 ENCOUNTER FOR GENERAL ADULT MEDICAL EXAMINATION WITHOUT ABNORMAL FINDINGS: ICD-10-CM

## 2019-05-09 NOTE — PROGRESS NOTES
Patient already has follow up appointment scheduled.     Future Appointments  5/16/2019  1:30 PM    Tammi Gee MD  [facility]  2/2/0018   65:45 AM   Rocio Pascual MD      [facility]

## 2019-05-20 ENCOUNTER — OFFICE VISIT (OUTPATIENT)
Dept: SURGERY | Facility: CLINIC | Age: 64
End: 2019-05-20
Payer: COMMERCIAL

## 2019-05-20 VITALS
SYSTOLIC BLOOD PRESSURE: 120 MMHG | TEMPERATURE: 98 F | HEART RATE: 71 BPM | WEIGHT: 149 LBS | BODY MASS INDEX: 25.44 KG/M2 | DIASTOLIC BLOOD PRESSURE: 77 MMHG | HEIGHT: 64 IN

## 2019-05-20 DIAGNOSIS — K59.00 CONSTIPATION, UNSPECIFIED CONSTIPATION TYPE: ICD-10-CM

## 2019-05-20 DIAGNOSIS — D12.6 TUBULAR ADENOMA OF COLON: Primary | ICD-10-CM

## 2019-05-20 PROCEDURE — 99213 OFFICE O/P EST LOW 20 MIN: CPT | Performed by: COLON & RECTAL SURGERY

## 2019-05-20 NOTE — PROGRESS NOTES
Office Visit Note       Active Problems  1. Tubular adenoma of colon    2.  Constipation, unspecified constipation type         Chief Complaint   Patient presents with:  Colonoscopy: Constipation, left lower quadrant abdominal pain         History of Presen • Thyroid disease    • Wears glasses    • Weight gain      Past Surgical History:   Procedure Laterality Date   • APPENDECTOMY     • BREAST SURGERY PROCEDURE UNLISTED      lumpectomy   • HYSTERECTOMY     • TAISHA NEEDLE LOCALIZATION W/ SPECIMEN 1 SITE LEFT (Patient taking differently: Take 1-2 mg by mouth nightly as needed. For leg cramps. )   ondansetron 4 MG Oral Tablet Dispersible Take 1 tablet (4 mg total) by mouth every 8 (eight) hours as needed for Nausea.    EPINEPHrine (EPIPEN) 0.3 MG/0.3ML Injection polyuria. Genitourinary: Negative for dysuria and hematuria. Musculoskeletal: Negative for arthralgias and myalgias. Skin: Negative for rash and wound. Neurological: Negative for dizziness, weakness and headaches.    Hematological: Does not bruise/b Registry) which includes the Dose Index Registry. PATIENT STATED HISTORY:(As transcribed by Technologist)  Patient has had unresolving pain to the LLQ for 3 weeks and occasional distention.  Patient received the 13 hour pre-medication protocol dus to a segment of acute diverticulitis with some infiltration of the fat along the lateral aspect of the sigmoid colon. No abscess, obstruction, or free air. No free fluid. Large amount of   stool seen throughout the colon.         Dictated by: Maureen Means quadrant, as well as constipation and feeling of abdominal fullness and bloating. Patient can continue her daily MiraLAX at this point. We will soon in addition to a high-fiber diet and daily fiber supplement.     First we will order a sits marker study t

## 2019-05-26 NOTE — PATIENT INSTRUCTIONS
Assessment   Tubular adenoma of colon  (primary encounter diagnosis)  Constipation, unspecified constipation type      Plan   The patient is doing well after colonoscopy. Findings include tubular adenoma of the ascending colon x3. Diverticulosis.   Narr

## 2019-05-30 DIAGNOSIS — R10.13 EPIGASTRIC PAIN: ICD-10-CM

## 2019-05-30 RX ORDER — OMEPRAZOLE 40 MG/1
CAPSULE, DELAYED RELEASE ORAL
Qty: 30 CAPSULE | Refills: 2 | Status: SHIPPED | OUTPATIENT
Start: 2019-05-30 | End: 2019-06-20

## 2019-05-31 ENCOUNTER — HOSPITAL ENCOUNTER (OUTPATIENT)
Dept: GENERAL RADIOLOGY | Age: 64
Discharge: HOME | End: 2019-05-31
Attending: COLON & RECTAL SURGERY
Payer: COMMERCIAL

## 2019-05-31 DIAGNOSIS — K59.00 CONSTIPATION, UNSPECIFIED CONSTIPATION TYPE: ICD-10-CM

## 2019-05-31 PROCEDURE — 74018 RADEX ABDOMEN 1 VIEW: CPT | Performed by: COLON & RECTAL SURGERY

## 2019-06-03 ENCOUNTER — HOSPITAL ENCOUNTER (OUTPATIENT)
Dept: GENERAL RADIOLOGY | Age: 64
Discharge: HOME | End: 2019-06-03
Attending: COLON & RECTAL SURGERY
Payer: COMMERCIAL

## 2019-06-03 DIAGNOSIS — K59.00 CONSTIPATION, UNSPECIFIED CONSTIPATION TYPE: ICD-10-CM

## 2019-06-03 PROCEDURE — 74018 RADEX ABDOMEN 1 VIEW: CPT | Performed by: COLON & RECTAL SURGERY

## 2019-06-20 ENCOUNTER — OFFICE VISIT (OUTPATIENT)
Dept: SURGERY | Facility: CLINIC | Age: 64
End: 2019-06-20
Payer: COMMERCIAL

## 2019-06-20 VITALS
WEIGHT: 148 LBS | BODY MASS INDEX: 25.27 KG/M2 | HEART RATE: 59 BPM | HEIGHT: 64 IN | DIASTOLIC BLOOD PRESSURE: 73 MMHG | SYSTOLIC BLOOD PRESSURE: 119 MMHG | TEMPERATURE: 98 F

## 2019-06-20 DIAGNOSIS — D12.6 TUBULAR ADENOMA OF COLON: ICD-10-CM

## 2019-06-20 DIAGNOSIS — K57.30 DIVERTICULOSIS OF LARGE INTESTINE WITHOUT HEMORRHAGE: ICD-10-CM

## 2019-06-20 DIAGNOSIS — K59.00 CONSTIPATION, UNSPECIFIED CONSTIPATION TYPE: Primary | ICD-10-CM

## 2019-06-20 PROCEDURE — 99213 OFFICE O/P EST LOW 20 MIN: CPT | Performed by: COLON & RECTAL SURGERY

## 2019-06-21 NOTE — PROGRESS NOTES
Office Visit Note       Active Problems      1. Constipation, unspecified constipation type    2. Tubular adenoma of colon    3.  Diverticulosis of large intestine without hemorrhage          Chief Complaint   Patient presents with:  Test Results: constpiat • APPENDECTOMY     • BREAST SURGERY PROCEDURE UNLISTED      lumpectomy   • HYSTERECTOMY     • TAISHA NEEDLE LOCALIZATION W/ SPECIMEN 1 SITE LEFT  1999    ADH   • TAISHA NEEDLE LOCALIZATION W/ SPECIMEN 1 SITE RIGHT  4995,2583   • NEEDLE BIOPSY RIGHT  2012    US reaction   simvastatin 20 MG Oral Tab TAKE 1 TABLET BY MOUTH EVERY DAY AT BEDTIME   Cyclobenzaprine HCl 5 MG Oral Tab Take 1 tablet (5 mg total) by mouth 3 (three) times daily as needed for Muscle spasms.    naproxen 500 MG Oral Tab Take 1 tablet (500 mg to headaches. Hematological: Does not bruise/bleed easily. Psychiatric/Behavioral: Negative for agitation and suicidal ideas. The patient is not nervous/anxious.          Physical Findings   /73   Pulse 59   Temp 97.8 °F (36.6 °C) (Oral)   Ht 64\" Dictated by: Jahaira Mariscal MD on 5/31/2019 at 11:06       Approved by: Jahaira Mariscal MD         PROCEDURE:  XR ABDOMEN (KUB) SITZ MARKER DAY 5 (CPT=74018)     INDICATIONS:  K59.00 Constipation, unspecified     COMPARISON:  [facility], XR ABDOMEN SITZ M to twice daily. She should add 1 teaspoon a day of Metamucil and slowly work up to 1 tablespoon a day. Patient should drink adequate water.     I will refer the patient to [address] [place] [address] for evaluation and possible consideration of prescription medication fo

## 2019-06-21 NOTE — PATIENT INSTRUCTIONS
Assessment   Constipation, unspecified constipation type  (primary encounter diagnosis)  Tubular adenoma of colon  Diverticulosis of large intestine without hemorrhage    Plan   Patient has a degree of colonic inertia given the 24 marker seen on day 3, how

## 2019-07-01 DIAGNOSIS — G47.09 OTHER INSOMNIA: ICD-10-CM

## 2019-07-01 RX ORDER — AMITRIPTYLINE HYDROCHLORIDE 25 MG/1
TABLET, FILM COATED ORAL
Qty: 30 TABLET | Refills: 2 | Status: SHIPPED | OUTPATIENT
Start: 2019-07-01 | End: 2019-10-25

## 2019-07-05 DIAGNOSIS — E78.00 ELEVATED CHOLESTEROL: ICD-10-CM

## 2019-07-05 RX ORDER — SIMVASTATIN 20 MG
TABLET ORAL
Qty: 90 TABLET | Refills: 1 | OUTPATIENT
Start: 2019-07-05

## 2019-07-05 NOTE — TELEPHONE ENCOUNTER
Rx Request  simvastatin 20 MG Oral Tab    Disp:     90               R: 1    Associated Dx: Elevated cholesterol    Last Visit: 04/02/2019    Last Refilled: 06/04/2018    Protocol Passed?  Yes[  ]       No[ x ]

## 2019-07-10 DIAGNOSIS — E78.00 ELEVATED CHOLESTEROL: ICD-10-CM

## 2019-07-10 RX ORDER — SIMVASTATIN 20 MG
TABLET ORAL
Qty: 30 TABLET | Refills: 0 | Status: SHIPPED | OUTPATIENT
Start: 2019-07-10 | End: 2019-08-02

## 2019-08-01 ENCOUNTER — OFFICE VISIT (OUTPATIENT)
Dept: SURGERY | Facility: CLINIC | Age: 64
End: 2019-08-01
Payer: COMMERCIAL

## 2019-08-01 VITALS
SYSTOLIC BLOOD PRESSURE: 118 MMHG | DIASTOLIC BLOOD PRESSURE: 62 MMHG | WEIGHT: 148 LBS | BODY MASS INDEX: 25 KG/M2 | HEART RATE: 59 BPM | TEMPERATURE: 98 F

## 2019-08-01 DIAGNOSIS — K57.30 DIVERTICULOSIS OF LARGE INTESTINE WITHOUT HEMORRHAGE: ICD-10-CM

## 2019-08-01 DIAGNOSIS — K59.00 CONSTIPATION, UNSPECIFIED CONSTIPATION TYPE: Primary | ICD-10-CM

## 2019-08-01 DIAGNOSIS — D12.6 TUBULAR ADENOMA OF COLON: ICD-10-CM

## 2019-08-01 PROCEDURE — 99213 OFFICE O/P EST LOW 20 MIN: CPT | Performed by: COLON & RECTAL SURGERY

## 2019-08-01 NOTE — PROGRESS NOTES
Office Visit Note       Active Problems      1. Constipation, unspecified constipation type    2. Tubular adenoma of colon    3.  Diverticulosis of large intestine without hemorrhage          Chief Complaint   Patient presents with:  Constipation: narrow st Leaking of urine    • Leg swelling    • Neuralgia august 2015   • Skin blushing/flushing    • Stress    • Thyroid disease    • Wears glasses    • Weight gain      Past Surgical History:   Procedure Laterality Date   • APPENDECTOMY     • BREAST SURGERY PROC mouth daily. rOPINIRole HCl 1 MG Oral Tab Take 1-2 tablets (1-2 mg total) by mouth nightly. For leg cramps. (Patient taking differently: Take 1-2 mg by mouth nightly as needed.  For leg cramps. )   ondansetron 4 MG Oral Tablet Dispersible Take 1 tablet (4 Endocrine: Negative for polydipsia and polyuria. Genitourinary: Negative for dysuria and hematuria. Musculoskeletal: Negative for arthralgias and myalgias. Skin: Negative for rash and wound.    Neurological: Negative for dizziness, weakness and head symptoms, she has never had a previous EGD. Repeat evaluation of the colon via colonoscopy and/or CT scan may be considered as well to determine if there is any diverticular stricture, however this seems less likely given her single episode.     No ind

## 2019-08-01 NOTE — PATIENT INSTRUCTIONS
Assessment   Constipation, unspecified constipation type  (primary encounter diagnosis)  Tubular adenoma of colon  Diverticulosis of large intestine without hemorrhage    Plan   Patient has ongoing constipation.   She is currently on Benefiber twice daily a

## 2019-08-02 DIAGNOSIS — E78.00 ELEVATED CHOLESTEROL: ICD-10-CM

## 2019-08-02 RX ORDER — SIMVASTATIN 20 MG
TABLET ORAL
Qty: 30 TABLET | Refills: 0 | Status: SHIPPED | OUTPATIENT
Start: 2019-08-02 | End: 2019-09-02

## 2019-08-02 NOTE — TELEPHONE ENCOUNTER
Lipid panel over due. Rx Request  simvastatin 20 MG Oral Tab    Disp:     30               R: 0    Associated Dx: Elevated cholesterol    Last Visit:  04/02/2019    Last Refilled: 07/10/2019    Protocol Passed?  June Gray  ]       No[ X ]

## 2019-08-24 DIAGNOSIS — E78.00 ELEVATED CHOLESTEROL: ICD-10-CM

## 2019-08-26 RX ORDER — SIMVASTATIN 20 MG
TABLET ORAL
Qty: 30 TABLET | Refills: 0 | OUTPATIENT
Start: 2019-08-26

## 2019-09-02 DIAGNOSIS — E78.00 ELEVATED CHOLESTEROL: ICD-10-CM

## 2019-09-05 RX ORDER — SIMVASTATIN 20 MG
TABLET ORAL
Qty: 30 TABLET | Refills: 0 | Status: SHIPPED | OUTPATIENT
Start: 2019-09-05 | End: 2019-10-25

## 2019-09-09 ENCOUNTER — OFFICE VISIT (OUTPATIENT)
Dept: RHEUMATOLOGY | Facility: CLINIC | Age: 64
End: 2019-09-09
Payer: COMMERCIAL

## 2019-09-09 VITALS
WEIGHT: 149 LBS | TEMPERATURE: 98 F | BODY MASS INDEX: 25.44 KG/M2 | RESPIRATION RATE: 16 BRPM | HEART RATE: 68 BPM | HEIGHT: 64 IN | SYSTOLIC BLOOD PRESSURE: 120 MMHG | DIASTOLIC BLOOD PRESSURE: 84 MMHG

## 2019-09-09 DIAGNOSIS — R76.8 POSITIVE ANA (ANTINUCLEAR ANTIBODY): ICD-10-CM

## 2019-09-09 DIAGNOSIS — M35.9 UNDIFFERENTIATED CONNECTIVE TISSUE DISEASE (HCC): Primary | ICD-10-CM

## 2019-09-09 PROCEDURE — 99213 OFFICE O/P EST LOW 20 MIN: CPT | Performed by: INTERNAL MEDICINE

## 2019-09-09 RX ORDER — HYDROXYCHLOROQUINE SULFATE 200 MG/1
200 TABLET, FILM COATED ORAL 2 TIMES DAILY
Qty: 180 TABLET | Refills: 3 | Status: SHIPPED | OUTPATIENT
Start: 2019-09-09

## 2019-09-09 NOTE — PROGRESS NOTES
[facility]  Dr. Paige Perez Progress Note     Subjective:   Deirdre Davis is a(n) 62year old female. Current complaints: Patient presents with:   Follow - Up: last office 2/19- pt co flare of diverticulitis-having EGD in 2 weeks-Co bloating and abdomin

## 2019-09-09 NOTE — PATIENT INSTRUCTIONS
Increase Plaquenil to 200 mg twice a day. Use Naproxen 500 mg once or twice a day as needed. Use Omeprazole daily. T Eye exam yearly while on Plaquenil. Trial of fructose diet for a month as ordered by Dr. Jorge Pratt of GI. Amitryptyline 25 mg at night.

## 2019-09-26 PROBLEM — R14.1 GAS PAIN: Status: ACTIVE | Noted: 2019-09-26

## 2019-09-26 PROBLEM — R10.13 EPIGASTRIC PAIN: Status: ACTIVE | Noted: 2019-09-26

## 2019-10-03 DIAGNOSIS — E78.00 ELEVATED CHOLESTEROL: ICD-10-CM

## 2019-10-03 RX ORDER — SIMVASTATIN 20 MG
TABLET ORAL
Qty: 30 TABLET | Refills: 0 | OUTPATIENT
Start: 2019-10-03

## 2019-10-03 NOTE — TELEPHONE ENCOUNTER
LABS OVERDUE    Rx Request  SIMVASTATIN 20 MG Oral Tab    Disp:       30             R: 0    Associated Dx: Elevated cholesterol    Last Visit: 04/02/2019    Last Refilled: 09/05/2019    Protocol Passed?  Yes[  ]       No[ x ]

## 2019-10-24 DIAGNOSIS — I10 ESSENTIAL HYPERTENSION: ICD-10-CM

## 2019-10-24 RX ORDER — METOPROLOL SUCCINATE 50 MG/1
TABLET, EXTENDED RELEASE ORAL
Qty: 90 TABLET | Refills: 1 | Status: SHIPPED | OUTPATIENT
Start: 2019-10-24 | End: 2019-10-25

## 2019-10-25 ENCOUNTER — OFFICE VISIT (OUTPATIENT)
Dept: FAMILY MEDICINE CLINIC | Facility: CLINIC | Age: 64
End: 2019-10-25
Payer: COMMERCIAL

## 2019-10-25 VITALS
RESPIRATION RATE: 16 BRPM | HEART RATE: 62 BPM | TEMPERATURE: 98 F | WEIGHT: 157 LBS | DIASTOLIC BLOOD PRESSURE: 76 MMHG | BODY MASS INDEX: 26.16 KG/M2 | SYSTOLIC BLOOD PRESSURE: 130 MMHG | HEIGHT: 65 IN | OXYGEN SATURATION: 97 %

## 2019-10-25 DIAGNOSIS — Z00.00 ANNUAL PHYSICAL EXAM: Primary | ICD-10-CM

## 2019-10-25 DIAGNOSIS — I10 ESSENTIAL HYPERTENSION: ICD-10-CM

## 2019-10-25 DIAGNOSIS — E03.9 HYPOTHYROIDISM, UNSPECIFIED TYPE: ICD-10-CM

## 2019-10-25 DIAGNOSIS — Z12.31 ENCOUNTER FOR SCREENING MAMMOGRAM FOR HIGH-RISK PATIENT: ICD-10-CM

## 2019-10-25 DIAGNOSIS — E04.9 GOITER: ICD-10-CM

## 2019-10-25 DIAGNOSIS — Z98.890: ICD-10-CM

## 2019-10-25 DIAGNOSIS — M62.838 MUSCLE SPASM: ICD-10-CM

## 2019-10-25 DIAGNOSIS — Z13.820 SCREENING FOR OSTEOPOROSIS: ICD-10-CM

## 2019-10-25 DIAGNOSIS — E78.00 ELEVATED CHOLESTEROL: ICD-10-CM

## 2019-10-25 DIAGNOSIS — G47.09 OTHER INSOMNIA: ICD-10-CM

## 2019-10-25 PROCEDURE — 99396 PREV VISIT EST AGE 40-64: CPT | Performed by: FAMILY MEDICINE

## 2019-10-25 RX ORDER — LEVOTHYROXINE SODIUM 88 UG/1
88 TABLET ORAL
Qty: 90 TABLET | Refills: 1 | Status: SHIPPED | OUTPATIENT
Start: 2019-10-25 | End: 2020-06-05

## 2019-10-25 RX ORDER — AMITRIPTYLINE HYDROCHLORIDE 25 MG/1
TABLET, FILM COATED ORAL
Qty: 90 TABLET | Refills: 1 | Status: SHIPPED | OUTPATIENT
Start: 2019-10-25 | End: 2020-04-27

## 2019-10-25 RX ORDER — METOPROLOL SUCCINATE 50 MG/1
50 TABLET, EXTENDED RELEASE ORAL
Qty: 90 TABLET | Refills: 1 | Status: SHIPPED | OUTPATIENT
Start: 2019-10-25 | End: 2021-06-16

## 2019-10-25 RX ORDER — SIMVASTATIN 20 MG
20 TABLET ORAL NIGHTLY
Qty: 90 TABLET | Refills: 1 | Status: SHIPPED | OUTPATIENT
Start: 2019-10-25 | End: 2020-06-05

## 2019-10-25 RX ORDER — CYCLOBENZAPRINE HCL 5 MG
5 TABLET ORAL 3 TIMES DAILY PRN
Qty: 30 TABLET | Refills: 0 | Status: SHIPPED | OUTPATIENT
Start: 2019-10-25

## 2019-10-25 NOTE — PROGRESS NOTES
HPI:   Philly Montes is a 62year old female who presents for a complete physical exam.     Wt Readings from Last 6 Encounters:  10/25/19 : 157 lb (71.2 kg)  10/16/19 : 156 lb (70.8 kg)  09/09/19 : 149 lb (67.6 kg)  08/20/19 : 159 lb (72.1 kg)  08/01/19 : with meals. , Disp: 32 capsule, Rfl: 0  Hydroxychloroquine Sulfate (PLAQUENIL) 200 MG Oral Tab, Take 1 tablet (200 mg total) by mouth 2 (two) times daily. , Disp: 180 tablet, Rfl: 3  SIMVASTATIN 20 MG Oral Tab, TAKE 1 TABLET BY MOUTH EVERY DAY AT BEDTIME, Markie Hernandez Past Medical History:   Diagnosis Date   • Abdominal distention    • Acute, but ill-defined, cerebrovascular disease    • Basal cell carcinoma of skin    • Bloating    • Constipation    • Depressive disorder, not elsewhere classified    • Diverticulitis GENERAL: feels well otherwise  SKIN: denies any unusual skin lesions  EYES:denies blurred vision or double vision  HEENT: denies nasal congestion, sinus pain or ST  LUNGS: denies shortness of breath with exertion  CARDIOVASCULAR: denies chest pain on exe spasms   - Cyclobenzaprine HCl 5 MG Oral Tab; Take 1 tablet (5 mg total) by mouth 3 (three) times daily as needed for Muscle spasms. Dispense: 30 tablet; Refill: 0    7.  Other insomnia    - Amitriptyline HCl 25 MG Oral Tab; TAKE 1 TABLET BY MOUTH EVERY DA

## 2019-10-28 DIAGNOSIS — R94.4 DECREASED GFR: Primary | ICD-10-CM

## 2019-11-13 ENCOUNTER — HOSPITAL ENCOUNTER (OUTPATIENT)
Dept: ULTRASOUND IMAGING | Age: 64
Discharge: HOME | End: 2019-11-13
Attending: FAMILY MEDICINE
Payer: COMMERCIAL

## 2019-11-13 ENCOUNTER — HOSPITAL ENCOUNTER (OUTPATIENT)
Dept: BONE DENSITY | Age: 64
Discharge: HOME | End: 2019-11-13
Attending: FAMILY MEDICINE
Payer: COMMERCIAL

## 2019-11-13 ENCOUNTER — HOSPITAL ENCOUNTER (OUTPATIENT)
Dept: GENERAL RADIOLOGY | Age: 64
Discharge: HOME | End: 2019-11-13
Attending: FAMILY MEDICINE
Payer: COMMERCIAL

## 2019-11-13 ENCOUNTER — HOSPITAL ENCOUNTER (OUTPATIENT)
Dept: MAMMOGRAPHY | Age: 64
Discharge: HOME | End: 2019-11-13
Attending: FAMILY MEDICINE
Payer: COMMERCIAL

## 2019-11-13 DIAGNOSIS — Z12.31 ENCOUNTER FOR SCREENING MAMMOGRAM FOR HIGH-RISK PATIENT: ICD-10-CM

## 2019-11-13 DIAGNOSIS — Z98.890: ICD-10-CM

## 2019-11-13 DIAGNOSIS — Z13.820 SCREENING FOR OSTEOPOROSIS: ICD-10-CM

## 2019-11-13 DIAGNOSIS — E04.9 GOITER: ICD-10-CM

## 2019-11-13 PROCEDURE — 77063 BREAST TOMOSYNTHESIS BI: CPT | Performed by: FAMILY MEDICINE

## 2019-11-13 PROCEDURE — 71046 X-RAY EXAM CHEST 2 VIEWS: CPT | Performed by: FAMILY MEDICINE

## 2019-11-13 PROCEDURE — 77067 SCR MAMMO BI INCL CAD: CPT | Performed by: FAMILY MEDICINE

## 2019-11-13 PROCEDURE — 76536 US EXAM OF HEAD AND NECK: CPT | Performed by: FAMILY MEDICINE

## 2019-11-13 PROCEDURE — 77080 DXA BONE DENSITY AXIAL: CPT | Performed by: FAMILY MEDICINE

## 2019-11-20 ENCOUNTER — HOSPITAL ENCOUNTER (OUTPATIENT)
Dept: ULTRASOUND IMAGING | Age: 64
Discharge: HOME | End: 2019-11-20
Attending: FAMILY MEDICINE
Payer: COMMERCIAL

## 2019-11-20 ENCOUNTER — HOSPITAL ENCOUNTER (OUTPATIENT)
Dept: MAMMOGRAPHY | Age: 64
Discharge: HOME | End: 2019-11-20
Attending: FAMILY MEDICINE
Payer: COMMERCIAL

## 2019-11-20 DIAGNOSIS — R92.2 INCONCLUSIVE MAMMOGRAM: ICD-10-CM

## 2019-11-20 PROCEDURE — 76642 ULTRASOUND BREAST LIMITED: CPT | Performed by: FAMILY MEDICINE

## 2019-11-20 PROCEDURE — 77065 DX MAMMO INCL CAD UNI: CPT | Performed by: FAMILY MEDICINE

## 2019-11-20 PROCEDURE — 77061 BREAST TOMOSYNTHESIS UNI: CPT | Performed by: FAMILY MEDICINE

## 2019-12-06 DIAGNOSIS — R10.13 EPIGASTRIC PAIN: ICD-10-CM

## 2019-12-06 RX ORDER — OMEPRAZOLE 40 MG/1
CAPSULE, DELAYED RELEASE ORAL
Qty: 90 CAPSULE | Refills: 0 | Status: SHIPPED | OUTPATIENT
Start: 2019-12-06 | End: 2021-05-05

## 2019-12-06 NOTE — TELEPHONE ENCOUNTER
Rx Request  Omeprazole      Disp:     90               R: 0    Last Visit: 10/25/2019     Last Refilled: 06/06/2019

## 2020-01-06 ENCOUNTER — OFFICE VISIT (OUTPATIENT)
Dept: FAMILY MEDICINE CLINIC | Facility: CLINIC | Age: 65
End: 2020-01-06
Payer: COMMERCIAL

## 2020-01-06 VITALS
HEART RATE: 68 BPM | DIASTOLIC BLOOD PRESSURE: 88 MMHG | RESPIRATION RATE: 12 BRPM | TEMPERATURE: 99 F | SYSTOLIC BLOOD PRESSURE: 142 MMHG | HEIGHT: 66 IN | WEIGHT: 156.81 LBS | BODY MASS INDEX: 25.2 KG/M2 | OXYGEN SATURATION: 98 %

## 2020-01-06 DIAGNOSIS — R42 VERTIGO: Primary | ICD-10-CM

## 2020-01-06 PROCEDURE — 99214 OFFICE O/P EST MOD 30 MIN: CPT | Performed by: INTERNAL MEDICINE

## 2020-01-06 RX ORDER — PREDNISONE 20 MG/1
TABLET ORAL
Qty: 8 TABLET | Refills: 0 | Status: SHIPPED | OUTPATIENT
Start: 2020-01-06 | End: 2021-06-16 | Stop reason: ALTCHOICE

## 2020-01-06 RX ORDER — MECLIZINE HCL 12.5 MG/1
TABLET ORAL
Qty: 10 TABLET | Refills: 0 | Status: SHIPPED | OUTPATIENT
Start: 2020-01-06

## 2020-01-07 NOTE — PROGRESS NOTES
Sandhya Santa is a 62year old female. HPI:   Here with dizziness since 4 days ago. Started while she was watching a movie. Typically lasts seconds, occasionally has lasted longer. Rare nausea. Had a similar episode years ago.   Feels like the room is needed.  ) 1 Bottle 1   • aspirin 81 MG Oral Tab Take 81 mg by mouth daily. • Cholecalciferol (VITAMIN D-3 OR) Take 1,000 Units by mouth daily. • VITAMIN E Take 400 mg by mouth daily. • Cyanocobalamin (VITAMIN B-12 CR OR) Take  by mouth. well nourished,in no apparent distress  SKIN: warm & dry  HEENT: atraumatic, normocephalic, TMs clear, throat clear, nares patent  NECK: supple,no adenopathy   LUNGS: CTA, easy breathing  CV: normal S1S2, RRR without murmur  GI: good BS's,no masses, HSM or

## 2020-03-27 ENCOUNTER — TELEPHONE (OUTPATIENT)
Dept: FAMILY MEDICINE CLINIC | Facility: CLINIC | Age: 65
End: 2020-03-27

## 2020-03-27 DIAGNOSIS — R39.9 UTI SYMPTOMS: Primary | ICD-10-CM

## 2020-03-27 RX ORDER — SULFAMETHOXAZOLE AND TRIMETHOPRIM 800; 160 MG/1; MG/1
1 TABLET ORAL 2 TIMES DAILY
Qty: 10 TABLET | Refills: 0 | Status: SHIPPED | OUTPATIENT
Start: 2020-03-27 | End: 2020-04-01

## 2020-03-27 NOTE — TELEPHONE ENCOUNTER
Pt has Cloudy Urine, Frequency, Back pain, Nausea. Thinks UTI.  is self quarentining because co-worker had Covid 23. Pls call back to advise. Would like med or Telemed Visit if possible.

## 2020-03-27 NOTE — TELEPHONE ENCOUNTER
Virtual/Telephone Check-In    Tarsha Roberts verbally consents to a Virtual/Telephone Check-In service on 03/27/20. Patient understands and accepts financial responsibility for any deductible, co-insurance and/or co-pays associated with this service.     D

## 2020-04-13 ENCOUNTER — TELEPHONE (OUTPATIENT)
Dept: FAMILY MEDICINE CLINIC | Facility: CLINIC | Age: 65
End: 2020-04-13

## 2020-04-27 DIAGNOSIS — G47.09 OTHER INSOMNIA: ICD-10-CM

## 2020-04-27 RX ORDER — AMITRIPTYLINE HYDROCHLORIDE 25 MG/1
TABLET, FILM COATED ORAL
Qty: 30 TABLET | Refills: 5 | Status: SHIPPED | OUTPATIENT
Start: 2020-04-27 | End: 2020-11-06

## 2020-06-05 DIAGNOSIS — E03.9 HYPOTHYROIDISM, UNSPECIFIED TYPE: ICD-10-CM

## 2020-06-05 DIAGNOSIS — E78.00 ELEVATED CHOLESTEROL: ICD-10-CM

## 2020-06-05 RX ORDER — SIMVASTATIN 20 MG
TABLET ORAL
Qty: 90 TABLET | Refills: 1 | Status: SHIPPED | OUTPATIENT
Start: 2020-06-05 | End: 2020-10-26

## 2020-06-05 RX ORDER — LEVOTHYROXINE SODIUM 88 UG/1
TABLET ORAL
Qty: 90 TABLET | Refills: 1 | Status: SHIPPED | OUTPATIENT
Start: 2020-06-05 | End: 2020-10-26

## 2020-06-19 ENCOUNTER — OFFICE VISIT (OUTPATIENT)
Dept: FAMILY MEDICINE CLINIC | Facility: CLINIC | Age: 65
End: 2020-06-19
Payer: COMMERCIAL

## 2020-06-19 VITALS
SYSTOLIC BLOOD PRESSURE: 118 MMHG | TEMPERATURE: 98 F | RESPIRATION RATE: 18 BRPM | HEART RATE: 77 BPM | DIASTOLIC BLOOD PRESSURE: 70 MMHG | WEIGHT: 156 LBS | BODY MASS INDEX: 25.07 KG/M2 | HEIGHT: 66 IN | OXYGEN SATURATION: 98 %

## 2020-06-19 DIAGNOSIS — R21 RASH: ICD-10-CM

## 2020-06-19 DIAGNOSIS — Z71.3 DIETARY COUNSELING: Primary | ICD-10-CM

## 2020-06-19 PROCEDURE — 99214 OFFICE O/P EST MOD 30 MIN: CPT | Performed by: PHYSICIAN ASSISTANT

## 2020-06-19 RX ORDER — LEVOTHYROXINE SODIUM 88 UG/1
TABLET ORAL
COMMUNITY
Start: 2020-06-05 | End: 2021-06-16 | Stop reason: ALTCHOICE

## 2020-06-19 RX ORDER — SIMVASTATIN 20 MG
TABLET ORAL
COMMUNITY
Start: 2020-06-05 | End: 2020-10-26

## 2020-06-19 RX ORDER — CLOBETASOL PROPIONATE 0.5 MG/G
1 CREAM TOPICAL 2 TIMES DAILY
Qty: 30 G | Refills: 0 | Status: SHIPPED | OUTPATIENT
Start: 2020-06-19 | End: 2021-06-16 | Stop reason: ALTCHOICE

## 2020-06-19 RX ORDER — VALACYCLOVIR HYDROCHLORIDE 1 G/1
1 TABLET, FILM COATED ORAL 3 TIMES DAILY
Qty: 21 TABLET | Refills: 0 | Status: SHIPPED | OUTPATIENT
Start: 2020-06-19 | End: 2020-06-26

## 2020-06-19 NOTE — PROGRESS NOTES
HPI:    Patient ID: Jassi Hidalgo is a 60year old female. HPI   Patient presents today c/o facial rash and is concerned about shingles. She has had shingles a couple times on the face and her symptoms now feel similar.  States she was out gardening wea • predniSONE 20 MG Oral Tab 2 tabs po each morning  x 4 days. Take with food.  8 tablet 0   • Meclizine HCl 12.5 MG Oral Tab 1 tab po BID x 2 days, then BID prn 10 tablet 0   • OMEPRAZOLE 40 MG Oral Capsule Delayed Release TAKE 1 CAPSULE BY MOUTH EVERY DAY Macrobid [employer]*    OTHER (SEE COMMENTS)    Comment:SEVERE VOMITING AND DIARRHEA  [place]            Comment:[place]  Wellbutrin [Bupropi*       PHYSICAL EXAM:   Physical Exam   Nursing note and vitals reviewed.    Constitutional: She i Meds This Visit:  Requested Prescriptions     Signed Prescriptions Disp Refills   • valACYclovir HCl 1 G Oral Tab 21 tablet 0     Sig: Take 1 tablet (1,000 mg total) by mouth 3 (three) times daily for 7 days.    • Clobetasol Propionate 0.05 % External C

## 2020-08-25 ENCOUNTER — OFFICE VISIT (OUTPATIENT)
Dept: FAMILY MEDICINE CLINIC | Facility: CLINIC | Age: 65
End: 2020-08-25
Payer: COMMERCIAL

## 2020-08-25 VITALS
BODY MASS INDEX: 24.75 KG/M2 | WEIGHT: 154 LBS | DIASTOLIC BLOOD PRESSURE: 80 MMHG | RESPIRATION RATE: 18 BRPM | HEIGHT: 66 IN | OXYGEN SATURATION: 98 % | SYSTOLIC BLOOD PRESSURE: 126 MMHG | TEMPERATURE: 98 F | HEART RATE: 78 BPM

## 2020-08-25 DIAGNOSIS — R39.9 URINARY SYMPTOM OR SIGN: Primary | ICD-10-CM

## 2020-08-25 DIAGNOSIS — R30.0 DYSURIA: ICD-10-CM

## 2020-08-25 LAB
GLUCOSE (URINE DIPSTICK): 100 MG/DL
KETONES (URINE DIPSTICK): 15 MG/DL
MULTISTIX LOT#: ABNORMAL NUMERIC
PH, URINE: 5 (ref 4.5–8)
SPECIFIC GRAVITY: 1.02 (ref 1–1.03)
UROBILINOGEN,SEMI-QN: 2 MG/DL (ref 0–1.9)

## 2020-08-25 PROCEDURE — 3079F DIAST BP 80-89 MM HG: CPT | Performed by: FAMILY MEDICINE

## 2020-08-25 PROCEDURE — 99213 OFFICE O/P EST LOW 20 MIN: CPT | Performed by: FAMILY MEDICINE

## 2020-08-25 PROCEDURE — 3008F BODY MASS INDEX DOCD: CPT | Performed by: FAMILY MEDICINE

## 2020-08-25 PROCEDURE — 81003 URINALYSIS AUTO W/O SCOPE: CPT | Performed by: FAMILY MEDICINE

## 2020-08-25 PROCEDURE — 3074F SYST BP LT 130 MM HG: CPT | Performed by: FAMILY MEDICINE

## 2020-08-25 RX ORDER — SULFAMETHOXAZOLE AND TRIMETHOPRIM 800; 160 MG/1; MG/1
1 TABLET ORAL 2 TIMES DAILY
Qty: 10 TABLET | Refills: 0 | Status: SHIPPED | OUTPATIENT
Start: 2020-08-25 | End: 2020-12-08

## 2020-08-25 NOTE — PROGRESS NOTES
HPI:   Colletta Salen is a 60year old female who presents with 4 days of urinary symptoms     Pt c/o dysuria, urgency and frequency   No fever  No back pain       Current Outpatient Medications   Medication Sig Dispense Refill   • METOPROLOL TARTRATE 25 M 500 MG Oral Tab Take 1 tablet (500 mg total) by mouth 2 (two) times daily with meals. (Patient taking differently: Take 500 mg by mouth 2 (two) times daily as needed.  ) 180 tablet 3   • BIOTIN OR Take by mouth.      • Fluticasone Propionate (FLONASE) 50 MC TONSILLECTOMY      adenoidectomy   • TOTAL ABDOM HYSTERECTOMY        Family History   Problem Relation Age of Onset   • Cancer Maternal Grandmother    • Breast Cancer Maternal Grandmother 71   • Cancer Paternal Grandfather    • Cancer Paternal Grandmother cranial nerves are intact,motor and sensory are grossly intac    ASSESSMENT AND PLAN:   Paxton Lopez is a 60year old female who presents for    1.  Dysuria    - URINALYSIS, AUTO, W/O SCOPE  - URINE CULTURE, ROUTINE  - Sulfamethoxazole-TMP -160 MG O

## 2020-09-27 DIAGNOSIS — M35.9 UNDIFFERENTIATED CONNECTIVE TISSUE DISEASE (HCC): ICD-10-CM

## 2020-09-28 RX ORDER — HYDROXYCHLOROQUINE SULFATE 200 MG/1
TABLET, FILM COATED ORAL
Qty: 180 TABLET | Refills: 3 | OUTPATIENT
Start: 2020-09-28

## 2020-10-26 ENCOUNTER — OFFICE VISIT (OUTPATIENT)
Dept: FAMILY MEDICINE CLINIC | Facility: CLINIC | Age: 65
End: 2020-10-26
Payer: COMMERCIAL

## 2020-10-26 VITALS
BODY MASS INDEX: 24.83 KG/M2 | OXYGEN SATURATION: 98 % | WEIGHT: 149 LBS | HEART RATE: 77 BPM | DIASTOLIC BLOOD PRESSURE: 72 MMHG | RESPIRATION RATE: 18 BRPM | TEMPERATURE: 98 F | HEIGHT: 65 IN | SYSTOLIC BLOOD PRESSURE: 128 MMHG

## 2020-10-26 DIAGNOSIS — N39.3 STRESS INCONTINENCE: ICD-10-CM

## 2020-10-26 DIAGNOSIS — E03.9 HYPOTHYROIDISM, UNSPECIFIED TYPE: ICD-10-CM

## 2020-10-26 DIAGNOSIS — Z98.890: ICD-10-CM

## 2020-10-26 DIAGNOSIS — Z01.419 WELL WOMAN EXAM WITH ROUTINE GYNECOLOGICAL EXAM: Primary | ICD-10-CM

## 2020-10-26 DIAGNOSIS — R21 RASH: ICD-10-CM

## 2020-10-26 DIAGNOSIS — Z00.00 ANNUAL PHYSICAL EXAM: ICD-10-CM

## 2020-10-26 DIAGNOSIS — Z12.31 ENCOUNTER FOR SCREENING MAMMOGRAM FOR HIGH-RISK PATIENT: ICD-10-CM

## 2020-10-26 DIAGNOSIS — E04.9 GOITER: ICD-10-CM

## 2020-10-26 DIAGNOSIS — E78.00 ELEVATED CHOLESTEROL: ICD-10-CM

## 2020-10-26 DIAGNOSIS — I10 ESSENTIAL HYPERTENSION: ICD-10-CM

## 2020-10-26 DIAGNOSIS — K59.09 CHRONIC CONSTIPATION: ICD-10-CM

## 2020-10-26 PROCEDURE — 90471 IMMUNIZATION ADMIN: CPT | Performed by: FAMILY MEDICINE

## 2020-10-26 PROCEDURE — 3008F BODY MASS INDEX DOCD: CPT | Performed by: FAMILY MEDICINE

## 2020-10-26 PROCEDURE — 3074F SYST BP LT 130 MM HG: CPT | Performed by: FAMILY MEDICINE

## 2020-10-26 PROCEDURE — 90686 IIV4 VACC NO PRSV 0.5 ML IM: CPT | Performed by: FAMILY MEDICINE

## 2020-10-26 PROCEDURE — 3078F DIAST BP <80 MM HG: CPT | Performed by: FAMILY MEDICINE

## 2020-10-26 PROCEDURE — 99396 PREV VISIT EST AGE 40-64: CPT | Performed by: FAMILY MEDICINE

## 2020-10-26 RX ORDER — LEVOTHYROXINE SODIUM 88 UG/1
88 TABLET ORAL DAILY
Qty: 90 TABLET | Refills: 1 | Status: SHIPPED | OUTPATIENT
Start: 2020-10-26 | End: 2021-03-03

## 2020-10-26 RX ORDER — SIMVASTATIN 20 MG
20 TABLET ORAL NIGHTLY
Qty: 90 TABLET | Refills: 1 | Status: SHIPPED | OUTPATIENT
Start: 2020-10-26 | End: 2021-03-03

## 2020-10-26 NOTE — PROGRESS NOTES
HPI:   Colletta Salen is a 60year old female who presents for a complete physical exam.     Wt Readings from Last 6 Encounters:  10/26/20 : 149 lb (67.6 kg)  08/25/20 : 154 lb (69.9 kg)  06/19/20 : 156 lb (70.8 kg)  01/06/20 : 156 lb 12.8 oz (71.1 kg)  12 lightheadedness. No exertional symptoms.     Continued skin sensitivity of legs  Has not seen rheum or derm in a while   Rash to right ankle       Current Outpatient Medications   Medication Sig Dispense Refill   • METOPROLOL TARTRATE 25 MG Oral Tab TAKE 2 Take 1 tablet (500 mg total) by mouth 2 (two) times daily with meals. (Patient taking differently: Take 500 mg by mouth 2 (two) times daily as needed.  ) 180 tablet 3   • BIOTIN OR Take by mouth.      • Fluticasone Propionate (FLONASE) 50 MCG/ACT Nasal Susp adenoidectomy   • TOTAL ABDOM HYSTERECTOMY        Family History   Problem Relation Age of Onset   • Cancer Maternal Grandmother    • Breast Cancer Maternal Grandmother 71   • Cancer Paternal Grandfather    • Cancer Paternal Grandmother    • Hypertension F bilaterally  MUSCULOSKELETAL: back is not tender,FROM of the back  EXTREMITIES: no cyanosis, clubbing or edema  NEURO: cranial nerves are intact,motor and sensory are grossly intac    ASSESSMENT AND PLAN:   Leigha Sheikh is a 60year old female who presen

## 2020-11-06 DIAGNOSIS — G47.09 OTHER INSOMNIA: ICD-10-CM

## 2020-11-06 RX ORDER — AMITRIPTYLINE HYDROCHLORIDE 25 MG/1
TABLET, FILM COATED ORAL
Qty: 30 TABLET | Refills: 5 | Status: SHIPPED | OUTPATIENT
Start: 2020-11-06 | End: 2021-03-03

## 2020-11-06 NOTE — TELEPHONE ENCOUNTER
Rx Request  Amitriptyline HCl 25 MG Oral Tab    Disp:    30                R: 5    Last Refilled: 10/26/2020    Last Visit: 04/27/2020    Protocol Passed?  Yes[ x ]       No[  ]

## 2020-12-08 ENCOUNTER — VIRTUAL PHONE E/M (OUTPATIENT)
Dept: FAMILY MEDICINE CLINIC | Facility: CLINIC | Age: 65
End: 2020-12-08
Payer: COMMERCIAL

## 2020-12-08 ENCOUNTER — TELEPHONE (OUTPATIENT)
Dept: FAMILY MEDICINE CLINIC | Facility: CLINIC | Age: 65
End: 2020-12-08

## 2020-12-08 DIAGNOSIS — R39.9 URINARY SYMPTOM OR SIGN: ICD-10-CM

## 2020-12-08 DIAGNOSIS — R39.9 URINARY TRACT INFECTION SYMPTOMS: Primary | ICD-10-CM

## 2020-12-08 DIAGNOSIS — Z20.822 CLOSE EXPOSURE TO COVID-19 VIRUS: ICD-10-CM

## 2020-12-08 DIAGNOSIS — R30.0 DYSURIA: ICD-10-CM

## 2020-12-08 PROCEDURE — 99213 OFFICE O/P EST LOW 20 MIN: CPT | Performed by: FAMILY MEDICINE

## 2020-12-08 RX ORDER — SULFAMETHOXAZOLE AND TRIMETHOPRIM 800; 160 MG/1; MG/1
1 TABLET ORAL 2 TIMES DAILY
Qty: 10 TABLET | Refills: 0 | Status: SHIPPED | OUTPATIENT
Start: 2020-12-08 | End: 2020-12-13

## 2020-12-08 NOTE — TELEPHONE ENCOUNTER
Pt states urgency and pain with urination since yesterday morning, Azo helping, but not relief.   Telephone appt made today with LE to evaluate,

## 2020-12-08 NOTE — PROGRESS NOTES
Virtual Telephone Check-In    Ericka Salvador verbally consents to a Virtual/Telephone Check-In visit on 12/08/20. Patient has been referred to the [facility] website at [email or web address] to review the yearly Consent to Treat document.     Patient Chris Ogden mouth 2 (two) times daily with meals. 60 capsule 3   • Cyclobenzaprine HCl 5 MG Oral Tab Take 1 tablet (5 mg total) by mouth 3 (three) times daily as needed for Muscle spasms.  30 tablet 0   • Metoprolol Succinate ER 50 MG Oral Tablet 24 Hr Take 1 tablet (5 glasses    • Weight gain       Past Surgical History:   Procedure Laterality Date   • APPENDECTOMY     • BREAST SURGERY PROCEDURE UNLISTED      lumpectomy   • COLONOSCOPY     • HYSTERECTOMY  1991    total hystero.    • TAISHA LOCALIZATION WIRE 1 SITE LEFT (CPT denies depression or anxiety  HEMATOLOGIC: denies hx of anemia  ALL/ASTHMA: denies asthma    EXAM:   EXAM:   GENERAL: clear speech / no increased work of breathing / speaking in full sentences/ alert and oriented X 3  PSYCH: judgement and insight are appro

## 2020-12-08 NOTE — TELEPHONE ENCOUNTER
Patient is calling in regards to symptoms of a UTI that started yesterday. She has taken OZO. Then she mentioned that her  found out that a co-worker tested positive for Covid yesterday so she had questions on if they were needing to be tested.

## 2021-01-09 ENCOUNTER — LAB ENCOUNTER (OUTPATIENT)
Dept: LAB | Age: 66
End: 2021-01-09
Attending: FAMILY MEDICINE
Payer: COMMERCIAL

## 2021-01-09 ENCOUNTER — HOSPITAL ENCOUNTER (OUTPATIENT)
Dept: ULTRASOUND IMAGING | Age: 66
Discharge: HOME | End: 2021-01-09
Attending: FAMILY MEDICINE
Payer: COMMERCIAL

## 2021-01-09 ENCOUNTER — HOSPITAL ENCOUNTER (OUTPATIENT)
Dept: GENERAL RADIOLOGY | Age: 66
Discharge: HOME | End: 2021-01-09
Attending: FAMILY MEDICINE
Payer: COMMERCIAL

## 2021-01-09 ENCOUNTER — HOSPITAL ENCOUNTER (OUTPATIENT)
Dept: MAMMOGRAPHY | Age: 66
Discharge: HOME | End: 2021-01-09
Attending: FAMILY MEDICINE
Payer: COMMERCIAL

## 2021-01-09 DIAGNOSIS — Z12.31 ENCOUNTER FOR SCREENING MAMMOGRAM FOR HIGH-RISK PATIENT: ICD-10-CM

## 2021-01-09 DIAGNOSIS — Z98.890: ICD-10-CM

## 2021-01-09 DIAGNOSIS — E04.9 GOITER: ICD-10-CM

## 2021-01-09 PROCEDURE — 76536 US EXAM OF HEAD AND NECK: CPT | Performed by: FAMILY MEDICINE

## 2021-01-09 PROCEDURE — 36415 COLL VENOUS BLD VENIPUNCTURE: CPT | Performed by: FAMILY MEDICINE

## 2021-01-09 PROCEDURE — 77063 BREAST TOMOSYNTHESIS BI: CPT | Performed by: FAMILY MEDICINE

## 2021-01-09 PROCEDURE — 80053 COMPREHEN METABOLIC PANEL: CPT | Performed by: FAMILY MEDICINE

## 2021-01-09 PROCEDURE — 77067 SCR MAMMO BI INCL CAD: CPT | Performed by: FAMILY MEDICINE

## 2021-01-09 PROCEDURE — 71046 X-RAY EXAM CHEST 2 VIEWS: CPT | Performed by: FAMILY MEDICINE

## 2021-01-12 NOTE — H&P
HPI:     Jamar Lee is a 60year old female with a PMH of HTN, HL, hypothyroid, GERD, chronic constipation, diverticulosis, recurrent UTIs.  She presents as a consult from Dr Butch Grewal office with AMH/intermittent gross hematuria and recurrent UTI/cy dietary irritants and provided educational materals for this. We also discussed trying prn AZO for pain relief with plenty of water. For hematuria I recommend we proceed with CT urogram and office cysto.  We discussed the risks and benefits to these test Problem Relation Age of Onset   • Cancer Maternal Grandmother    • Breast Cancer Maternal Grandmother 71   • Cancer Paternal Grandfather    • Cancer Paternal Grandmother    • Hypertension Father    • Other (acute MI) Father    • Other (CHF) Maternal [facility] [place] Take 1 tablet (5 mg total) by mouth 3 (three) times daily as needed for Muscle spasms. 30 tablet 0   • Metoprolol Succinate ER 50 MG Oral Tablet 24 Hr Take 1 tablet (50 mg total) by mouth once daily.  90 tablet 1   • Hydroxychloroquine Sulfate (PLAQUENIL) 2 normocephalic, atraumatic  EYES: sclera non-icteric  EARS: hearing intact  ORAL CAVITY: mucosa moist  NECK/THYROID: no obvious goiter or masses  LUNGS: nonlabored breathing  ABDOMEN: soft, no obvious masses or tenderness  SKIN: no obvious rashes     ASSESS

## 2021-01-18 ENCOUNTER — OFFICE VISIT (OUTPATIENT)
Dept: SURGERY | Facility: CLINIC | Age: 66
End: 2021-01-18
Payer: COMMERCIAL

## 2021-01-18 VITALS — TEMPERATURE: 97 F | HEART RATE: 64 BPM | DIASTOLIC BLOOD PRESSURE: 84 MMHG | SYSTOLIC BLOOD PRESSURE: 134 MMHG

## 2021-01-18 DIAGNOSIS — R31.21 ASYMPTOMATIC MICROSCOPIC HEMATURIA: Primary | ICD-10-CM

## 2021-01-18 DIAGNOSIS — N39.3 STRESS INCONTINENCE: ICD-10-CM

## 2021-01-18 DIAGNOSIS — K59.00 CONSTIPATION, UNSPECIFIED CONSTIPATION TYPE: ICD-10-CM

## 2021-01-18 DIAGNOSIS — N30.90 CYSTITIS: ICD-10-CM

## 2021-01-18 DIAGNOSIS — R31.0 GROSS HEMATURIA: ICD-10-CM

## 2021-01-18 DIAGNOSIS — N94.10 DYSPAREUNIA IN FEMALE: ICD-10-CM

## 2021-01-18 LAB
APPEARANCE: CLEAR
MULTISTIX LOT#: 5077 NUMERIC
PH, URINE: 5.5 (ref 4.5–8)
SPECIFIC GRAVITY: 1.02 (ref 1–1.03)
URINE-COLOR: YELLOW
UROBILINOGEN,SEMI-QN: 0.2 MG/DL (ref 0–1.9)

## 2021-01-18 PROCEDURE — 81003 URINALYSIS AUTO W/O SCOPE: CPT | Performed by: UROLOGY

## 2021-01-18 PROCEDURE — 99244 OFF/OP CNSLTJ NEW/EST MOD 40: CPT | Performed by: UROLOGY

## 2021-01-18 PROCEDURE — 3079F DIAST BP 80-89 MM HG: CPT | Performed by: UROLOGY

## 2021-01-18 PROCEDURE — 3075F SYST BP GE 130 - 139MM HG: CPT | Performed by: UROLOGY

## 2021-01-18 RX ORDER — PREDNISONE 50 MG/1
TABLET ORAL
Qty: 3 TABLET | Refills: 0 | Status: SHIPPED | OUTPATIENT
Start: 2021-01-18 | End: 2021-01-18

## 2021-01-18 RX ORDER — ESTRADIOL 0.1 MG/G
CREAM VAGINAL
Qty: 42.5 G | Refills: 5 | Status: SHIPPED | OUTPATIENT
Start: 2021-01-18 | End: 2021-01-18

## 2021-01-18 RX ORDER — PREDNISONE 50 MG/1
TABLET ORAL
Qty: 3 TABLET | Refills: 0 | Status: SHIPPED | OUTPATIENT
Start: 2021-01-18 | End: 2021-06-16 | Stop reason: ALTCHOICE

## 2021-01-18 RX ORDER — ESTRADIOL 0.1 MG/G
CREAM VAGINAL
Qty: 42.5 G | Refills: 5 | Status: SHIPPED | OUTPATIENT
Start: 2021-01-18

## 2021-01-18 RX ORDER — PREDNISONE 10 MG/1
10 TABLET ORAL DAILY
Qty: 5 TABLET | Refills: 0 | Status: SHIPPED | OUTPATIENT
Start: 2021-01-18 | End: 2021-01-18

## 2021-01-18 NOTE — PATIENT INSTRUCTIONS
1. Drink enough water to keep urine clear to pale yellow  2. Cut back on citrus fruits  3. Follow up with GI to discuss constipation  4. Estrogen cream three times weekly  5. Can try baking soda, AZO/pyridium as listed below.   6. Need CT urogram and offi Treatment may involve a combination of medicine, lifestyle changes, surgery and other methods. There is no single known effective treatment. It may take some time to find the right combination of treatments for you.   Medicine options include:  · Pain medic © 2249-1768 [employer] [address] [facility], [facility] [place]. All rights reserved. This information is not intended as a substitute for professional medical care. Always follow your healthcare professional's instructions.

## 2021-01-29 NOTE — IMAGING NOTE
1/29 @6140 Reviewed pre-treatment medications for Ct IV contrast dye allergies: Prednisone 50 mg PO 13,7 and 1 hour prior to appt. Benadryl 50 mg PO 1 hour prior to appt as well. Advised to have  take her home.  CONRAD

## 2021-02-05 ENCOUNTER — HOSPITAL ENCOUNTER (OUTPATIENT)
Dept: CT IMAGING | Age: 66
Discharge: HOME | End: 2021-02-05
Attending: UROLOGY
Payer: COMMERCIAL

## 2021-02-05 DIAGNOSIS — R31.0 GROSS HEMATURIA: ICD-10-CM

## 2021-02-05 PROCEDURE — 74178 CT ABD&PLV WO CNTR FLWD CNTR: CPT | Performed by: UROLOGY

## 2021-02-05 PROCEDURE — 76377 3D RENDER W/INTRP POSTPROCES: CPT | Performed by: UROLOGY

## 2021-02-05 NOTE — PROGRESS NOTES
HPI:     Spencer Becerra is a 60year old female with a PMH of HTN, HL, hypothyroid, GERD, chronic constipation (goes 4-5 days without BMs), diverticulosis, recurrent UTIs. PCP - Monroe Zara    Following for:  1.  AMH and gross hematuria episodes with cyst renal cortex calcification and we discussed that nothing needs to be done for this. Pelvic exam shows atrophic vaginitis and tenderness to palpation.  Mild urethral hypermobilitys  Office cysto today shows very mild cystitis changes    Discussed adding OAB WILFRIDO and goes through 3 PPD/damp (stable during flare-ups)  Vaginal dryness/irritation: yes, significant  Dyspareunia: yes  Constipation: lifelong issues and has gone up to 16 days between BMs, typically every 4-5 days and sometimes firm, also sometimes mariola UNLISTED      lumpectomy   • COLONOSCOPY     • HYSTERECTOMY  1991    total hystero. • TAISHA LOCALIZATION WIRE 1 SITE LEFT (CPT=19281)  1999    ADH   • TAISHA LOCALIZATION WIRE 1 SITE RIGHT (CPT=19281) Right 1990    benign.    • TAISHA LOCALIZATION WIRE 2 SITE RIG simvastatin 20 MG Oral Tab Take 1 tablet (20 mg total) by mouth nightly. 90 tablet 1   • Levothyroxine Sodium 88 MCG Oral Tab Take 1 tablet (88 mcg total) by mouth daily.  90 tablet 1   • metoprolol Tartrate 25 MG Oral Tab      • AMITIZA 24 MCG Oral Cap ALICIA External Cream Apply bid For 10-14 days 30 g 1   • metoprolol Tartrate 25 MG Oral Tab Take 2 tablets (50 mg total) by mouth daily.  180 tablet 1   • Levothyroxine Sodium 88 MCG Oral Tab      • Clobetasol Propionate 0.05 % External Cream Apply 1 Application CYSTOURETHROSCOPY    Cystitis    Urinary urgency  -     Mirabegron ER (MYRBETRIQ) 50 MG Oral Tablet 24 Hr; Take 50 mg by mouth daily. Plan:  1. Drink enough water to keep urine clear to pale yellow  2. Cut back on citrus fruits  3.  Follow up with GI t

## 2021-02-12 ENCOUNTER — TELEPHONE (OUTPATIENT)
Dept: SURGERY | Facility: CLINIC | Age: 66
End: 2021-02-12

## 2021-02-12 DIAGNOSIS — R31.21 ASYMPTOMATIC MICROSCOPIC HEMATURIA: Primary | ICD-10-CM

## 2021-02-12 RX ORDER — DIAZEPAM 10 MG/1
10 TABLET ORAL SEE ADMIN INSTRUCTIONS
Qty: 2 TABLET | Refills: 0 | Status: SHIPPED | OUTPATIENT
Start: 2021-02-12 | End: 2021-02-12

## 2021-02-12 RX ORDER — DIAZEPAM 10 MG/1
10 TABLET ORAL SEE ADMIN INSTRUCTIONS
Qty: 2 TABLET | Refills: 0 | Status: SHIPPED | OUTPATIENT
Start: 2021-02-12 | End: 2022-02-03

## 2021-02-12 NOTE — TELEPHONE ENCOUNTER
Yes, she can take 1-2 tablets valium 20-30 min before procedure. Needs a ride home. I sent valium to pharmacy.     Thanks,  MPH

## 2021-02-12 NOTE — TELEPHONE ENCOUNTER
Pharmacist Doreen Sainz from [facility] called and needs a verbal on diazepam that was sent to them this morning because it was 1st sent to [facility] but cancelled. She needs to speak to a NURSE to confirm this script.  I do see that Dr. Puma Camacho did sent this medicati

## 2021-02-12 NOTE — TELEPHONE ENCOUNTER
I called the patient back and discussed Iodine allergy. I discussed that we can use a different cleaning solution, and rec that she reiterate this allergy at her procedure appt. Pt also asked if the cysto could be done under twilight. Pt is okay with proc

## 2021-02-15 ENCOUNTER — PROCEDURE (OUTPATIENT)
Dept: SURGERY | Facility: CLINIC | Age: 66
End: 2021-02-15
Payer: COMMERCIAL

## 2021-02-15 VITALS — DIASTOLIC BLOOD PRESSURE: 76 MMHG | TEMPERATURE: 97 F | SYSTOLIC BLOOD PRESSURE: 131 MMHG | HEART RATE: 60 BPM

## 2021-02-15 DIAGNOSIS — R82.90 URINE FINDING: Primary | ICD-10-CM

## 2021-02-15 DIAGNOSIS — N39.3 STRESS INCONTINENCE: ICD-10-CM

## 2021-02-15 DIAGNOSIS — N94.10 DYSPAREUNIA IN FEMALE: ICD-10-CM

## 2021-02-15 DIAGNOSIS — R39.15 URINARY URGENCY: ICD-10-CM

## 2021-02-15 DIAGNOSIS — R31.0 GROSS HEMATURIA: ICD-10-CM

## 2021-02-15 DIAGNOSIS — N30.90 CYSTITIS: ICD-10-CM

## 2021-02-15 LAB
APPEARANCE: CLEAR
MULTISTIX LOT#: 5077 NUMERIC
PH, URINE: 7 (ref 4.5–8)
SPECIFIC GRAVITY: 1.01 (ref 1–1.03)
URINE-COLOR: YELLOW
UROBILINOGEN,SEMI-QN: 0.2 MG/DL (ref 0–1.9)

## 2021-02-15 PROCEDURE — 81003 URINALYSIS AUTO W/O SCOPE: CPT | Performed by: UROLOGY

## 2021-02-15 PROCEDURE — 99214 OFFICE O/P EST MOD 30 MIN: CPT | Performed by: UROLOGY

## 2021-02-15 PROCEDURE — 3075F SYST BP GE 130 - 139MM HG: CPT | Performed by: UROLOGY

## 2021-02-15 PROCEDURE — 3078F DIAST BP <80 MM HG: CPT | Performed by: UROLOGY

## 2021-02-15 PROCEDURE — 52000 CYSTOURETHROSCOPY: CPT | Performed by: UROLOGY

## 2021-02-15 RX ORDER — CIPROFLOXACIN 500 MG/1
500 TABLET, FILM COATED ORAL ONCE
Status: COMPLETED | OUTPATIENT
Start: 2021-02-15 | End: 2021-02-15

## 2021-02-15 RX ORDER — MIRABEGRON 50 MG/1
50 TABLET, FILM COATED, EXTENDED RELEASE ORAL DAILY
Qty: 30 TABLET | Refills: 11 | Status: SHIPPED | OUTPATIENT
Start: 2021-02-15 | End: 2022-02-02

## 2021-02-15 RX ADMIN — CIPROFLOXACIN 500 MG: 500 TABLET, FILM COATED ORAL at 10:50:00

## 2021-02-15 NOTE — PATIENT INSTRUCTIONS
Plan:  1. Drink enough water to keep urine clear to pale yellow  2. Cut back on citrus fruits  3. Follow up with GI to discuss constipation (has been over a year since last visit)  4. Estrogen cream three times weekly  5.  Can try baking soda, AZO/pyridium

## 2021-03-03 DIAGNOSIS — E78.00 ELEVATED CHOLESTEROL: ICD-10-CM

## 2021-03-03 DIAGNOSIS — I10 ESSENTIAL HYPERTENSION: ICD-10-CM

## 2021-03-03 DIAGNOSIS — G47.09 OTHER INSOMNIA: ICD-10-CM

## 2021-03-03 DIAGNOSIS — E03.9 HYPOTHYROIDISM, UNSPECIFIED TYPE: ICD-10-CM

## 2021-03-03 RX ORDER — AMITRIPTYLINE HYDROCHLORIDE 25 MG/1
TABLET, FILM COATED ORAL
Qty: 30 TABLET | Refills: 0 | Status: SHIPPED | OUTPATIENT
Start: 2021-03-03 | End: 2021-07-23

## 2021-03-03 RX ORDER — SIMVASTATIN 20 MG
20 TABLET ORAL NIGHTLY
Qty: 90 TABLET | Refills: 0 | Status: SHIPPED | OUTPATIENT
Start: 2021-03-03 | End: 2021-06-21

## 2021-03-03 RX ORDER — LEVOTHYROXINE SODIUM 88 UG/1
88 TABLET ORAL DAILY
Qty: 90 TABLET | Refills: 0 | Status: SHIPPED | OUTPATIENT
Start: 2021-03-03 | End: 2021-06-21

## 2021-03-03 NOTE — TELEPHONE ENCOUNTER
All meds prescribed by Dr. Padmaja Elias switched to new pharmacy [facility]  [address] in [facility] First name:  Jorge                     MR # [id number]  Date of Birth: [birth year]	Time of Birth:  0326   Birth Weight:  1060    Admission Date and Time:  18 @ 03:26         Gestational Age: 28.4      Source of admission [ x__ ] Inborn     [ __ ]Transport from    [facility]:  28.4 week female born to a 24 year old  mother via urgent  for severe pre-ecclampsia/HELLP syndrome. Maternal history uncomplicated. Pregnancy complicated by gestational hypertension, previously on methyldopa. Baby was also thought to have cleft lip and palate based on ultrasound. Maternal blood type A+. Prenatal labs negative, non-reactive and immune. GBS pending at time of delivery. On day of delivery, mom received Mg, labetalol, and hydralazine for severe pre-eclampsia. Received betamethasone x1. Ampicillin 2g x 1. Transferred from [place] to [place].   Maternal HELLPP labs significant for plt 52, LFTs >300, but Hgb 13. Decision made to undergo urgent  under general anesthesia. No rupture, no labor. Infant emerged limp, without spontaneous cry. HR < 60. Required tactile stimulation, suctioning, and PPV. HR improved to > 60 within first minute of life, but baby continued having intermittent spontaneous respirations until 2 minutes of PPV were given, after which baby had spontaneous respirations, HR > 100, pink color, and improved tone. On examination, baby has unilateral incomplete cleft lip and mild deformity of anterior alveolar ridge. Apgars 5, 8.    Social History: No history of alcohol/tobacco exposure obtained  FHx: non-contributory to the condition being treated   ROS: unable to obtain ()     Interval Events:   restarted Caffeine 3/13 - no apneic episodes since - s/p NC 3/20, slowly maturing feeding pattern    **************************************************************************************************  Age:2m    LOS:59d    Vital Signs:  T(C): 36.7 ( @ 05:00), Max: 36.8 ( @ 11:00)  HR: 162 ( @ 05:00) (140 - 162)  BP: 80/47 ( @ 02:00) (66/23 - 80/47)  RR: 34 ( @ 05:00) (28 - 56)  SpO2: 95% ( @ 05:00) (92% - 100%)      LABS:                                        0   0 )-----------( 0             [ @ 05:07]                  32.5  S 0%  B 0%  [place]incoln 0%  Myelo 0%  Promyelo 0%  Blasts 0%  Lymph 0%  Mono 0%  Eos 0%  Baso 0%  Retic 6.0%                        10.3   12.5 )-----------( 311             [ @ 06:59]                  31.7  S 12.0%  B 1%  [place]incoln 1%  Myelo 0%  Promyelo 0%  Blasts 2%  Lymph 62.0%  Mono 17.0%  Eos 5.0%  Baso 0%  Retic 0%        N/A  |N/A  | 7      ------------------<N/A  Ca 10.4 Mg N/A  Ph 6.7   [ @ 05:07]  N/A   | N/A  | N/A         N/A  |N/A  | 13     ------------------<N/A  Ca 10.5 Mg N/A  Ph 7.1   [ @ 05:33]  N/A   | N/A  | N/A               Alkaline Phosphatase []  425, Alkaline Phosphatase []  443  Albumin [] 3.1, Albumin [] 2.8       TFT's []    TSH: 4.39 T4: N/A fT4: 1.7                     CAPILLARY BLOOD GLUCOSE          ferrous sulfate Oral Liquid - Peds 4.7 milliGRAM(s) Elemental Iron daily  glycerin  Pediatric Rectal Suppository - Peds 0.25 Suppository(s) daily PRN  multivitamin Oral Drops - Peds 1 milliLiter(s) daily      RESPIRATORY SUPPORT:  [ _ ] Mechanical Ventilation:   [ _ ] Nasal Cannula: _ __ _ Liters, FiO2: ___ %  [ x ]RA          **************************************************************************************************		    PHYSICAL EXAM:  General:	Awake and active;   Head:		AFOF, unilateral incomplete cleft lip and mild deformity of anterior alveolar ridge  Eyes:		Normally set bilaterally  Ears:		Patent bilaterally, no deformities  Nose/Mouth:	Nares patent, palate intact  Neck:		No masses, intact clavicles  Chest/Lungs:      Breath sounds equal to auscultation. No retractions  CV:		No murmurs appreciated, normal pulses bilaterally  Abdomen:          Soft nontender nondistended, no masses, bowel sounds present  :		Normal for gestational age  Back:		Intact skin, no sacral dimples or tags  Anus:		Grossly patent  Extremities:	FROM, no hip clicks  Skin:		Pink, no lesions  Neuro exam:	Appropriate tone, activity          DISCHARGE PLANNING (date and status):  Hep B Vacc: given   CCHD:	pass 		  :					  Hearing:    screen:	  Circumcision: desires ( no consent)  Hip  rec: n/a cephalic  	  Synagis: 			  Other Immunizations (with dates):    		  Neurodevelop eval?	PTD  CPR class done? recommended   	  PVS at DC?  TVS at DC?	  FE at DC?	    PMD:          Name:  ______________ _             Contact information:  ______________ _  Pharmacy: Name:  ______________ _              Contact information:  ______________ _    Follow-up appointments (list):  PMD  [facility]  ND  Ophtho  Cleft palate team      Time spent on the total subsequent encounter with >50% of the visit spent on counseling and/or coordination of care:[ _ ] 15 min[ _ ] 25 min[ _ ] 35 min  [ _ ] Discharge time spent >30 min   [ __ ] Car seat oxymetry reviewed.

## 2021-03-03 NOTE — TELEPHONE ENCOUNTER
Pt requesting scripts of Amitriptyline,Simvastatin,Levothyroxine and Metoprolol go to new pharmacy.   Last ov 10/20/2020

## 2021-05-05 ENCOUNTER — OFFICE VISIT (OUTPATIENT)
Dept: FAMILY MEDICINE CLINIC | Facility: CLINIC | Age: 66
End: 2021-05-05
Payer: COMMERCIAL

## 2021-05-05 VITALS
WEIGHT: 149 LBS | RESPIRATION RATE: 16 BRPM | SYSTOLIC BLOOD PRESSURE: 114 MMHG | DIASTOLIC BLOOD PRESSURE: 74 MMHG | HEIGHT: 65 IN | BODY MASS INDEX: 24.83 KG/M2 | HEART RATE: 61 BPM | OXYGEN SATURATION: 99 %

## 2021-05-05 DIAGNOSIS — R25.2 LEG CRAMP: ICD-10-CM

## 2021-05-05 DIAGNOSIS — K21.00 GASTROESOPHAGEAL REFLUX DISEASE WITH ESOPHAGITIS WITHOUT HEMORRHAGE: Primary | ICD-10-CM

## 2021-05-05 DIAGNOSIS — I83.813 VARICOSE VEINS OF BOTH LOWER EXTREMITIES WITH PAIN: ICD-10-CM

## 2021-05-05 PROCEDURE — 3008F BODY MASS INDEX DOCD: CPT | Performed by: FAMILY MEDICINE

## 2021-05-05 PROCEDURE — 99213 OFFICE O/P EST LOW 20 MIN: CPT | Performed by: FAMILY MEDICINE

## 2021-05-05 PROCEDURE — 3074F SYST BP LT 130 MM HG: CPT | Performed by: FAMILY MEDICINE

## 2021-05-05 PROCEDURE — 3078F DIAST BP <80 MM HG: CPT | Performed by: FAMILY MEDICINE

## 2021-05-05 RX ORDER — OMEPRAZOLE 40 MG/1
40 CAPSULE, DELAYED RELEASE ORAL DAILY
Qty: 30 CAPSULE | Refills: 2 | Status: SHIPPED | OUTPATIENT
Start: 2021-05-05

## 2021-05-05 NOTE — PROGRESS NOTES
HPI:   Nir Rowell is a 73year old female who presents with several concerns     Pt reports she woke up with bile in her mouth and throat 4/10   Pt could not breath   Pt has had this happen in the past   Pt denies snoring   Pt was on vacation and repor Hr Take 1 tablet (50 mg total) by mouth once daily. 90 tablet 1   • Hydroxychloroquine Sulfate (PLAQUENIL) 200 MG Oral Tab Take 1 tablet (200 mg total) by mouth 2 (two) times daily.  180 tablet 3   • ondansetron 4 MG Oral Tablet Dispersible Take 1 tablet (4 skin    • Bleeding nose    • Bloating    • Blood in urine    • Calculus of kidney    • Constipation    • Depressive disorder, not elsewhere classified    • Diverticulitis    • Easy bruising    • Endometriosis    • Endometriosis, site unspecified    • Fatig date: 5/24/2017        Years since quitting: 3.9      Smokeless tobacco: Never Used    Alcohol use: No      Alcohol/week: 0.0 standard drinks      Comment: socially    Drug use: No    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   Diet: watche INTERNAL    Questions answered and patient indicates understanding of these issues and agrees to the plan. Follow up in 3- 6 months or sooner if needed.

## 2021-06-16 ENCOUNTER — OFFICE VISIT (OUTPATIENT)
Dept: FAMILY MEDICINE CLINIC | Facility: CLINIC | Age: 66
End: 2021-06-16
Payer: COMMERCIAL

## 2021-06-16 VITALS
WEIGHT: 149 LBS | HEART RATE: 68 BPM | RESPIRATION RATE: 16 BRPM | HEIGHT: 65 IN | SYSTOLIC BLOOD PRESSURE: 122 MMHG | OXYGEN SATURATION: 98 % | BODY MASS INDEX: 24.83 KG/M2 | DIASTOLIC BLOOD PRESSURE: 84 MMHG

## 2021-06-16 DIAGNOSIS — M26.621 ARTHRALGIA OF RIGHT TEMPOROMANDIBULAR JOINT: ICD-10-CM

## 2021-06-16 DIAGNOSIS — B02.9 HERPES ZOSTER WITHOUT COMPLICATION: Primary | ICD-10-CM

## 2021-06-16 PROCEDURE — 99214 OFFICE O/P EST MOD 30 MIN: CPT | Performed by: FAMILY MEDICINE

## 2021-06-16 PROCEDURE — 3008F BODY MASS INDEX DOCD: CPT | Performed by: FAMILY MEDICINE

## 2021-06-16 PROCEDURE — 3074F SYST BP LT 130 MM HG: CPT | Performed by: FAMILY MEDICINE

## 2021-06-16 PROCEDURE — 3079F DIAST BP 80-89 MM HG: CPT | Performed by: FAMILY MEDICINE

## 2021-06-16 RX ORDER — CYCLOBENZAPRINE HCL 5 MG
5 TABLET ORAL NIGHTLY PRN
Qty: 21 TABLET | Refills: 0 | Status: SHIPPED | OUTPATIENT
Start: 2021-06-16 | End: 2021-08-23

## 2021-06-16 RX ORDER — VALACYCLOVIR HYDROCHLORIDE 1 G/1
1 TABLET, FILM COATED ORAL 3 TIMES DAILY
Qty: 21 TABLET | Refills: 0 | Status: SHIPPED | OUTPATIENT
Start: 2021-06-16 | End: 2021-09-26

## 2021-06-16 NOTE — PROGRESS NOTES
[facility] Progress Note    SUBJECTIVE: José Miguel Jefferson 73year old female is here today for Patient presents with:  Rash: left arm, started Sunday. Pt thought it was a bug bite.  She states it feels like when she had shingles  Jaw Pain      Possib • NEEDLE BIOPSY RIGHT  2012    US guided biopsy-benign   • OOPHORECTOMY Bilateral 1989    total hystero.    • OTHER  1/1/00'    bronchoscopy w excision of tumor   • OTHER      thoracotomy   • OTHER SURGICAL HISTORY      gallbladder   • OTHER SURGICAL HIST days, then BID prn 10 tablet 0   • Cyclobenzaprine HCl 5 MG Oral Tab Take 1 tablet (5 mg total) by mouth 3 (three) times daily as needed for Muscle spasms.  30 tablet 0   • Hydroxychloroquine Sulfate (PLAQUENIL) 200 MG Oral Tab Take 1 tablet (200 mg total)

## 2021-06-19 DIAGNOSIS — E03.9 HYPOTHYROIDISM, UNSPECIFIED TYPE: ICD-10-CM

## 2021-06-19 DIAGNOSIS — E78.00 ELEVATED CHOLESTEROL: ICD-10-CM

## 2021-06-21 RX ORDER — LEVOTHYROXINE SODIUM 88 UG/1
TABLET ORAL
Qty: 90 TABLET | Refills: 0 | Status: SHIPPED | OUTPATIENT
Start: 2021-06-21 | End: 2021-09-24

## 2021-06-21 RX ORDER — SIMVASTATIN 20 MG
TABLET ORAL
Qty: 90 TABLET | Refills: 0 | Status: SHIPPED | OUTPATIENT
Start: 2021-06-21 | End: 2021-09-24

## 2021-07-03 ENCOUNTER — IMMUNIZATION (OUTPATIENT)
Dept: LAB | Facility: HOSPITAL | Age: 66
End: 2021-07-03
Attending: EMERGENCY MEDICINE
Payer: COMMERCIAL

## 2021-07-03 DIAGNOSIS — Z23 NEED FOR VACCINATION: Primary | ICD-10-CM

## 2021-07-03 PROCEDURE — 0001A SARSCOV2 VAC 30MCG/0.3ML IM: CPT

## 2021-07-08 ENCOUNTER — TELEPHONE (OUTPATIENT)
Dept: FAMILY MEDICINE CLINIC | Facility: CLINIC | Age: 66
End: 2021-07-08

## 2021-07-08 DIAGNOSIS — M26.621 ARTHRALGIA OF RIGHT TEMPOROMANDIBULAR JOINT: Primary | ICD-10-CM

## 2021-07-08 NOTE — TELEPHONE ENCOUNTER
Patient was in for jaw pain. She was prescribed a muscle relaxer and was told if it didn't help to give Dr. Marva Deleon a hollar back. She is hollering it isn't better.

## 2021-07-09 NOTE — TELEPHONE ENCOUNTER
This may be a bit of a hike, but Dr. Alfie Carrasco has an interest in TMJ issues    Varney Holstein, MD

## 2021-07-21 DIAGNOSIS — G47.09 OTHER INSOMNIA: ICD-10-CM

## 2021-07-22 ENCOUNTER — TELEPHONE (OUTPATIENT)
Dept: FAMILY MEDICINE CLINIC | Facility: CLINIC | Age: 66
End: 2021-07-22

## 2021-07-23 RX ORDER — AMITRIPTYLINE HYDROCHLORIDE 25 MG/1
TABLET, FILM COATED ORAL
Qty: 30 TABLET | Refills: 0 | Status: SHIPPED | OUTPATIENT
Start: 2021-07-23 | End: 2021-08-30

## 2021-07-24 ENCOUNTER — IMMUNIZATION (OUTPATIENT)
Dept: LAB | Facility: HOSPITAL | Age: 66
End: 2021-07-24
Attending: EMERGENCY MEDICINE
Payer: COMMERCIAL

## 2021-07-24 DIAGNOSIS — Z23 NEED FOR VACCINATION: Primary | ICD-10-CM

## 2021-07-24 PROCEDURE — 0002A SARSCOV2 VAC 30MCG/0.3ML IM: CPT

## 2021-07-27 ENCOUNTER — TELEPHONE (OUTPATIENT)
Dept: PHYSICAL THERAPY | Facility: HOSPITAL | Age: 66
End: 2021-07-27

## 2021-07-28 ENCOUNTER — OFFICE VISIT (OUTPATIENT)
Dept: PHYSICAL THERAPY | Facility: HOSPITAL | Age: 66
End: 2021-07-28
Attending: UROLOGY
Payer: COMMERCIAL

## 2021-07-28 DIAGNOSIS — N94.10 DYSPAREUNIA IN FEMALE: ICD-10-CM

## 2021-07-28 DIAGNOSIS — N39.3 STRESS INCONTINENCE: ICD-10-CM

## 2021-07-28 PROCEDURE — 97163 PT EVAL HIGH COMPLEX 45 MIN: CPT

## 2021-07-28 PROCEDURE — 97112 NEUROMUSCULAR REEDUCATION: CPT

## 2021-07-28 PROCEDURE — 97110 THERAPEUTIC EXERCISES: CPT

## 2021-07-28 NOTE — PROGRESS NOTES
MUSCULOSKELETAL AND PELVIC FLOOR EVALUATION:   Referring Physician: Dr. Filomena Banda  Diagnosis:    Stress incontinence (N39.3)  Dyspareunia in female (N94.10)        Date of Service: 7/28/2021     PATIENT Marc Del Rio is a 73year old female  who p Insomnia, unspecified    • Irregular bowel habits    • Leaking of urine    • Leg swelling    • Neuralgia august 2015   • Night sweats    • Pain with bowel movements    • Skin blushing/flushing    • Stress    • Thyroid disease    • Wears glasses    • Weight but are not limited to bowel/bladder issues dictating her activities. Signs and symptoms are consistent with diagnosis of Stress incontinence (N39.3), Dyspareunia in female (N94.10),  . Pt and PT discussed evaluation findings, pathology, POC and HEP.   Pt outcomes.  Educated on pelvic anatomy and function with diagrams and pelvic model, bladder normatives, adequate hydration levels, proper toileting posture, instructed in bladder, bowel, and diet diary log and issued handout , stress/urge urinary incontinenc of constipation may be affected by chronicity of symptoms      Patient/Family/Caregiver was advised of these findings, precautions, and treatment options and has agreed to actively participate in planning and for this course of care.     Thank you for your

## 2021-08-02 ENCOUNTER — TELEPHONE (OUTPATIENT)
Dept: PHYSICAL THERAPY | Facility: HOSPITAL | Age: 66
End: 2021-08-02

## 2021-08-03 ENCOUNTER — APPOINTMENT (OUTPATIENT)
Dept: PHYSICAL THERAPY | Facility: HOSPITAL | Age: 66
End: 2021-08-03
Attending: UROLOGY
Payer: COMMERCIAL

## 2021-08-10 ENCOUNTER — APPOINTMENT (OUTPATIENT)
Dept: PHYSICAL THERAPY | Facility: HOSPITAL | Age: 66
End: 2021-08-10
Attending: UROLOGY
Payer: COMMERCIAL

## 2021-08-17 ENCOUNTER — TELEPHONE (OUTPATIENT)
Dept: FAMILY MEDICINE CLINIC | Facility: CLINIC | Age: 66
End: 2021-08-17

## 2021-08-17 NOTE — TELEPHONE ENCOUNTER
Patient is calling stating she is having jaw pain on right side - difficulty opening mouth completely.     She was requesting appt w/ LE.

## 2021-08-17 NOTE — TELEPHONE ENCOUNTER
Pt reports this was a new issue about a month ago. Started after she had a visit at the dentist and thinks maybe she had mouth open too wide? Reports difficulty opening all the way on R side. When she tries to force, it pops.  Is supposed to have a crown,

## 2021-08-23 ENCOUNTER — OFFICE VISIT (OUTPATIENT)
Dept: FAMILY MEDICINE CLINIC | Facility: CLINIC | Age: 66
End: 2021-08-23
Payer: COMMERCIAL

## 2021-08-23 VITALS
HEIGHT: 65 IN | DIASTOLIC BLOOD PRESSURE: 78 MMHG | SYSTOLIC BLOOD PRESSURE: 128 MMHG | HEART RATE: 74 BPM | RESPIRATION RATE: 16 BRPM | BODY MASS INDEX: 25.16 KG/M2 | OXYGEN SATURATION: 95 % | WEIGHT: 151 LBS

## 2021-08-23 DIAGNOSIS — M26.609 TMJ DYSFUNCTION: Primary | ICD-10-CM

## 2021-08-23 PROCEDURE — 3008F BODY MASS INDEX DOCD: CPT | Performed by: FAMILY MEDICINE

## 2021-08-23 PROCEDURE — 3074F SYST BP LT 130 MM HG: CPT | Performed by: FAMILY MEDICINE

## 2021-08-23 PROCEDURE — 3078F DIAST BP <80 MM HG: CPT | Performed by: FAMILY MEDICINE

## 2021-08-23 PROCEDURE — 99213 OFFICE O/P EST LOW 20 MIN: CPT | Performed by: FAMILY MEDICINE

## 2021-08-23 RX ORDER — CYCLOBENZAPRINE HCL 10 MG
10 TABLET ORAL NIGHTLY
Qty: 14 TABLET | Refills: 1 | Status: SHIPPED | OUTPATIENT
Start: 2021-08-23 | End: 2021-09-12

## 2021-08-23 NOTE — PROGRESS NOTES
[facility] Progress Note    SUBJECTIVE: José Miguel Jefferson 73year old female is here today for Patient presents with:  Jaw Pain: 1 1/2 months      Had been to a dentist appointment told she'd need a crown replaced, and then jaw was sore, and seems t Hx:  No changes    ROS  Constitutional: No fevers, chills or night sweats      OBJECTIVE:  /78   Pulse 74   Resp 16   Ht 5' 5\" (1.651 m)   Wt 151 lb (68.5 kg)   SpO2 95%   BMI 25.13 kg/m²     Exam  Jaw: right TMJ clicks with opening and closing of j • naproxen 500 MG Oral Tab Take 1 tablet (500 mg total) by mouth 2 (two) times daily with meals. (Patient taking differently: Take 500 mg by mouth 2 (two) times daily as needed.  ) 180 tablet 3   • BIOTIN OR Take by mouth.      • aspirin 81 MG Oral Tab Ta

## 2021-08-24 ENCOUNTER — OFFICE VISIT (OUTPATIENT)
Dept: PHYSICAL THERAPY | Facility: HOSPITAL | Age: 66
End: 2021-08-24
Attending: UROLOGY
Payer: COMMERCIAL

## 2021-08-24 PROCEDURE — 97110 THERAPEUTIC EXERCISES: CPT

## 2021-08-24 PROCEDURE — 97112 NEUROMUSCULAR REEDUCATION: CPT

## 2021-08-24 NOTE — PROGRESS NOTES
Dx:  Stress incontinence (N39.3)  Dyspareunia in female (N94.10)         Authorized # of Visits:  No auth         Next MD visit: none scheduled  Fall Risk: standard         Precautions: n/a             Subjective:  Had really bad constipation and then after drop     Bladder/  bowel log                          Colon massage-instructions + minimal manual    HEP                                                               Skilled Services: Skilled selection of therex and manual therapy, education given through classified    • Diverticulitis    • Easy bruising    • Endometriosis    • Endometriosis, site unspecified    • Fatigue    • Frequent urination    • Frequent UTI    • Headache disorder    • Insomnia, unspecified    • Irregular bowel habits    • Leaking of u bowel/bladder habits, decreased core/hip strength. Chronic constipation may affect achievement of goals. Pt had difficulty with pelvic floor relaxation/lengthening.   Functional deficits include but are not limited to bowel/bladder issues dictating her acti

## 2021-08-28 DIAGNOSIS — G47.09 OTHER INSOMNIA: ICD-10-CM

## 2021-08-30 RX ORDER — AMITRIPTYLINE HYDROCHLORIDE 25 MG/1
TABLET, FILM COATED ORAL
Qty: 30 TABLET | Refills: 0 | Status: SHIPPED | OUTPATIENT
Start: 2021-08-30 | End: 2021-09-24

## 2021-08-31 ENCOUNTER — OFFICE VISIT (OUTPATIENT)
Dept: PHYSICAL THERAPY | Facility: HOSPITAL | Age: 66
End: 2021-08-31
Attending: UROLOGY
Payer: COMMERCIAL

## 2021-08-31 PROCEDURE — 97140 MANUAL THERAPY 1/> REGIONS: CPT

## 2021-08-31 PROCEDURE — 97110 THERAPEUTIC EXERCISES: CPT

## 2021-08-31 PROCEDURE — 97112 NEUROMUSCULAR REEDUCATION: CPT

## 2021-08-31 NOTE — PROGRESS NOTES
Dx:  Stress incontinence (N39.3)  Dyspareunia in female (N94.10)         Authorized # of Visits:  No auth         Next MD visit: none scheduled  Fall Risk: standard         Precautions: n/a             Subjective: yesterday, had a bowel movement after 8 da Date:   Tx#: 5/ Date: Tx#: 6/ Date: Tx#: 7/ Date: Tx#: 8/   biofeedback -  external sensor was placed and leads were attached  Resting tone  Tonic  Phasic  [place]  with coordinated breathing       Biofeedback with constant interpretation of results. less leakage, pain. Pt goals include decrease leakage, decrease pain and constipation. Past medical history was reviewed with Misael Crowell.  Significant findings include   Past Medical History:   Diagnosis Date   • Abdominal distention    • Acute, but ill-define penetration and well as being uncomfortable due to abdominal bloating, has tenderness afterward that lasts at least 12 hours.   Sexual [place] Status: active  Pain with initial and/or deep penetration: yes    Ade Nikolay presents to physical ther

## 2021-09-07 ENCOUNTER — OFFICE VISIT (OUTPATIENT)
Dept: PHYSICAL THERAPY | Facility: HOSPITAL | Age: 66
End: 2021-09-07
Attending: UROLOGY
Payer: COMMERCIAL

## 2021-09-07 PROCEDURE — 97112 NEUROMUSCULAR REEDUCATION: CPT

## 2021-09-07 PROCEDURE — 97140 MANUAL THERAPY 1/> REGIONS: CPT

## 2021-09-07 PROCEDURE — 97110 THERAPEUTIC EXERCISES: CPT

## 2021-09-07 NOTE — PROGRESS NOTES
Dx:  Stress incontinence (N39.3)  Dyspareunia in female (N94.10)         Authorized # of Visits:  No auth         Next MD visit: none scheduled  Fall Risk: standard         Precautions: n/a             Subjective: Had another BM last Thursday that was very Next visit: constipation and pelvic pain strategies and interventions    Date: 8/24/2021  Tx#: 2/12 Date: 8/31/2021   Tx#: 3/ Date: 9/7/2021   Tx#: 4/ Date: Tx#: 5/ Date: Tx#: 6/ Date: Tx#: 7/ Date:    Tx#: 8/   biofeedback -  external sensor was plac distention. Has lost 12#. Has to wear \"fluffy\" clothes. Some times has loose stools. Pt reports that she has had recurrent UTI's that started prior to her marriage.    Current symptoms include: abdominal pain, vaginal pain, urgency/frequency, pressure, inc 2x  Fluid Intake: water  Bladder irritants: 1 cup of coffee, iced tea  Post void dribble: no  Hovering: yes  Empty bladder just in case: yes  Do you ever leak urine without knowing it? no    BOWEL HABITS  Types of symptoms: Constipation and Incontinence relaxation: absent-slow  Involuntary contraction: present  Involuntary relaxation: absent-slow    Internal Examination     Pelvic Floor Muscle strength: (PERF= Power/Endurance/Reps/Fast) MMT: 2/3/3/4  External Anal Sphincter: 1/5  Accessory Muscle Use: glu

## 2021-09-21 ENCOUNTER — TELEPHONE (OUTPATIENT)
Dept: PHYSICAL THERAPY | Facility: HOSPITAL | Age: 66
End: 2021-09-21

## 2021-09-21 ENCOUNTER — APPOINTMENT (OUTPATIENT)
Dept: PHYSICAL THERAPY | Facility: HOSPITAL | Age: 66
End: 2021-09-21
Attending: UROLOGY
Payer: COMMERCIAL

## 2021-09-24 DIAGNOSIS — E03.9 HYPOTHYROIDISM, UNSPECIFIED TYPE: ICD-10-CM

## 2021-09-24 DIAGNOSIS — G47.09 OTHER INSOMNIA: ICD-10-CM

## 2021-09-24 DIAGNOSIS — E78.00 ELEVATED CHOLESTEROL: ICD-10-CM

## 2021-09-24 RX ORDER — LEVOTHYROXINE SODIUM 88 UG/1
TABLET ORAL
Qty: 90 TABLET | Refills: 0 | Status: SHIPPED | OUTPATIENT
Start: 2021-09-24 | End: 2022-02-03

## 2021-09-24 RX ORDER — SIMVASTATIN 20 MG
TABLET ORAL
Qty: 90 TABLET | Refills: 0 | Status: SHIPPED | OUTPATIENT
Start: 2021-09-24 | End: 2022-02-03

## 2021-09-24 RX ORDER — AMITRIPTYLINE HYDROCHLORIDE 25 MG/1
TABLET, FILM COATED ORAL
Qty: 30 TABLET | Refills: 0 | Status: SHIPPED | OUTPATIENT
Start: 2021-09-24 | End: 2021-11-08

## 2021-09-26 ENCOUNTER — TELEPHONE (OUTPATIENT)
Dept: FAMILY MEDICINE CLINIC | Facility: CLINIC | Age: 66
End: 2021-09-26

## 2021-09-26 DIAGNOSIS — B02.9 HERPES ZOSTER WITHOUT COMPLICATION: ICD-10-CM

## 2021-09-26 RX ORDER — VALACYCLOVIR HYDROCHLORIDE 1 G/1
1 TABLET, FILM COATED ORAL 3 TIMES DAILY
Qty: 21 TABLET | Refills: 0 | Status: SHIPPED | OUTPATIENT
Start: 2021-09-26 | End: 2021-10-03

## 2021-09-27 ENCOUNTER — OFFICE VISIT (OUTPATIENT)
Dept: FAMILY MEDICINE CLINIC | Facility: CLINIC | Age: 66
End: 2021-09-27
Payer: COMMERCIAL

## 2021-09-27 VITALS — WEIGHT: 151 LBS | BODY MASS INDEX: 25.16 KG/M2 | HEIGHT: 65 IN

## 2021-09-27 DIAGNOSIS — L23.7 POISON IVY DERMATITIS: Primary | ICD-10-CM

## 2021-09-27 DIAGNOSIS — L01.00 IMPETIGO: ICD-10-CM

## 2021-09-27 PROCEDURE — 3008F BODY MASS INDEX DOCD: CPT | Performed by: FAMILY MEDICINE

## 2021-09-27 PROCEDURE — 99213 OFFICE O/P EST LOW 20 MIN: CPT | Performed by: FAMILY MEDICINE

## 2021-09-27 RX ORDER — CEPHALEXIN 500 MG/1
1000 CAPSULE ORAL 2 TIMES DAILY
Qty: 28 CAPSULE | Refills: 0 | Status: SHIPPED | OUTPATIENT
Start: 2021-09-27 | End: 2021-10-04

## 2021-09-27 RX ORDER — PREDNISONE 20 MG/1
60 TABLET ORAL DAILY
Qty: 15 TABLET | Refills: 0 | Status: SHIPPED | OUTPATIENT
Start: 2021-09-27 | End: 2021-10-02

## 2021-09-27 NOTE — PROGRESS NOTES
HPI:   Deirdre Davis is a 73year old female who presents with rash     It started Friday   Pt has been in the garden some   Pt c/o right arm burning - blisters have opened up   There is a honey residue   Very itchy   No burning         Current Outpatient severe allergic reaction 1 each 0   • naproxen 500 MG Oral Tab Take 1 tablet (500 mg total) by mouth 2 (two) times daily with meals.  (Patient taking differently: Take 500 mg by mouth 2 (two) times daily as needed.  ) 180 tablet 3   • BIOTIN OR Take by mout OTHER SURGICAL HISTORY      gallbladder   • OTHER SURGICAL HISTORY      thyroidectomy   • THYROIDECTOMY     • TONSILLECTOMY      adenoidectomy   • TOTAL ABDOM HYSTERECTOMY        Family History   Problem Relation Age of Onset   • Cancer Maternal Grandmothe predniSONE 20 MG Oral Tab; Take 3 tablets (60 mg total) by mouth daily for 5 days. Dispense: 15 tablet; Refill: 0    2. Impetigo    - cephalexin 500 MG Oral Cap; Take 2 capsules (1,000 mg total) by mouth 2 (two) times daily for 7 days.   Dispense: 28 capsu

## 2021-10-12 ENCOUNTER — APPOINTMENT (OUTPATIENT)
Dept: PHYSICAL THERAPY | Facility: HOSPITAL | Age: 66
End: 2021-10-12
Attending: FAMILY MEDICINE
Payer: COMMERCIAL

## 2021-10-19 ENCOUNTER — APPOINTMENT (OUTPATIENT)
Dept: PHYSICAL THERAPY | Facility: HOSPITAL | Age: 66
End: 2021-10-19
Attending: UROLOGY
Payer: COMMERCIAL

## 2021-10-20 DIAGNOSIS — I10 ESSENTIAL HYPERTENSION: ICD-10-CM

## 2021-10-26 ENCOUNTER — APPOINTMENT (OUTPATIENT)
Dept: PHYSICAL THERAPY | Facility: HOSPITAL | Age: 66
End: 2021-10-26
Attending: UROLOGY
Payer: COMMERCIAL

## 2021-11-06 DIAGNOSIS — G47.09 OTHER INSOMNIA: ICD-10-CM

## 2021-11-08 RX ORDER — AMITRIPTYLINE HYDROCHLORIDE 25 MG/1
TABLET, FILM COATED ORAL
Qty: 30 TABLET | Refills: 0 | Status: SHIPPED | OUTPATIENT
Start: 2021-11-08 | End: 2021-12-14

## 2021-12-13 DIAGNOSIS — G47.09 OTHER INSOMNIA: ICD-10-CM

## 2021-12-14 RX ORDER — AMITRIPTYLINE HYDROCHLORIDE 25 MG/1
TABLET, FILM COATED ORAL
Qty: 30 TABLET | Refills: 0 | Status: SHIPPED | OUTPATIENT
Start: 2021-12-14 | End: 2022-01-25

## 2021-12-18 ENCOUNTER — OFFICE VISIT (OUTPATIENT)
Dept: FAMILY MEDICINE CLINIC | Facility: CLINIC | Age: 66
End: 2021-12-18
Payer: COMMERCIAL

## 2021-12-18 VITALS
OXYGEN SATURATION: 98 % | SYSTOLIC BLOOD PRESSURE: 128 MMHG | DIASTOLIC BLOOD PRESSURE: 68 MMHG | TEMPERATURE: 98 F | HEART RATE: 75 BPM | BODY MASS INDEX: 24.49 KG/M2 | HEIGHT: 65 IN | WEIGHT: 147 LBS | RESPIRATION RATE: 14 BRPM

## 2021-12-18 DIAGNOSIS — Z20.822 EXPOSURE TO COVID-19 VIRUS: Primary | ICD-10-CM

## 2021-12-18 PROCEDURE — 3074F SYST BP LT 130 MM HG: CPT | Performed by: FAMILY MEDICINE

## 2021-12-18 PROCEDURE — 3078F DIAST BP <80 MM HG: CPT | Performed by: FAMILY MEDICINE

## 2021-12-18 PROCEDURE — 3008F BODY MASS INDEX DOCD: CPT | Performed by: FAMILY MEDICINE

## 2021-12-18 PROCEDURE — 99212 OFFICE O/P EST SF 10 MIN: CPT | Performed by: FAMILY MEDICINE

## 2021-12-18 NOTE — PROGRESS NOTES
Taco Sandy is a 73year old female. S:  Patient presents today with the following concerns:  · Granddaughter tested positive for covid-19. Last exposure date was 12/15/2021. But weekend before this she spent the weekend at her house.     · No sympto MG Oral Tab Take 1 tablet (500 mg total) by mouth 2 (two) times daily with meals. (Patient taking differently: Take 500 mg by mouth 2 (two) times daily as needed.  ) 180 tablet 3   • BIOTIN OR Take by mouth.      • aspirin 81 MG Oral Tab Take 81 mg by mouth Mood, affect, and behavior are normal.  SKIN: no rashes,no suspicious lesions  HEENT: atraumatic, normocephalic,ears and throat are clear  EYES:PERRLA, EOMI  NECK: supple,no adenopathy  LUNGS: CTA, no RRW  CARDIO: RRR without murmur  EXTREMITIES: no edema

## 2021-12-18 NOTE — PATIENT INSTRUCTIONS
Coronavirus Disease 2019 (COVID-19)     [facility] [address] is committed to the safety and well-being of our patients, members, employees, and communities.  As concerns arise about the new strain of coronavirus that causes COVID-19, [facility] [address] exposure  • After day 7 from date of last exposure with a negative test result (test must occur on day 5 or later)  After stopping quarantine, you should  • Watch for symptoms until 14 days after exposure.   • If you have symptoms, immediately self-isolate Care     If you are awaiting test results or are confirmed positive for COVID -19, and your symptoms worsen at home with symptoms such as: extreme weakness, difficult breathing, or unrelenting fevers greater than 100.4 degrees Fahrenheit, you should contac Follow-up  If you are diagnosed with COVID, refrain from exercise until approved by your primary care provider. Please call your primary care provider within 2 days of your discharge to arrange for a telehealth follow-up.  CDC does not recommend repeat test Control & Prevention (CDC)  10 things you can do to manage your health at home, [place].[email or web address]. pdf  [place] Retrieved March 17, 2021, from https://[email or web address]/[email or web address]/covid-19-information/covid-19-long-haulers. html  Long-term effects of covid-19. (n.d.).  Retrieved May 11, 2021, from [email or web address]

## 2021-12-30 ENCOUNTER — TELEPHONE (OUTPATIENT)
Dept: FAMILY MEDICINE CLINIC | Facility: CLINIC | Age: 66
End: 2021-12-30

## 2021-12-30 DIAGNOSIS — J02.9 SORE THROAT: ICD-10-CM

## 2021-12-30 DIAGNOSIS — R50.9 FEVER, UNSPECIFIED FEVER CAUSE: ICD-10-CM

## 2021-12-30 DIAGNOSIS — R05.9 COUGH: Primary | ICD-10-CM

## 2021-12-30 DIAGNOSIS — R52 BODY ACHES: ICD-10-CM

## 2021-12-31 ENCOUNTER — LAB ENCOUNTER (OUTPATIENT)
Dept: LAB | Age: 66
End: 2021-12-31
Attending: FAMILY MEDICINE
Payer: COMMERCIAL

## 2021-12-31 DIAGNOSIS — R50.9 FEVER, UNSPECIFIED FEVER CAUSE: ICD-10-CM

## 2021-12-31 DIAGNOSIS — R05.9 COUGH: ICD-10-CM

## 2021-12-31 DIAGNOSIS — R52 BODY ACHES: ICD-10-CM

## 2021-12-31 DIAGNOSIS — J02.9 SORE THROAT: ICD-10-CM

## 2022-01-03 LAB — SARS-COV-2 RNA RESP QL NAA+PROBE: DETECTED

## 2022-01-05 ENCOUNTER — TELEMEDICINE (OUTPATIENT)
Dept: FAMILY MEDICINE CLINIC | Facility: CLINIC | Age: 67
End: 2022-01-05

## 2022-01-05 ENCOUNTER — TELEPHONE (OUTPATIENT)
Dept: FAMILY MEDICINE CLINIC | Facility: CLINIC | Age: 67
End: 2022-01-05

## 2022-01-05 DIAGNOSIS — U07.1 COVID-19 VIRUS INFECTION: Primary | ICD-10-CM

## 2022-01-05 PROCEDURE — 99213 OFFICE O/P EST LOW 20 MIN: CPT | Performed by: FAMILY MEDICINE

## 2022-01-05 RX ORDER — TRIAMCINOLONE ACETONIDE 0.1 %
1 PASTE (GRAM) DENTAL 2 TIMES DAILY
Qty: 5 G | Refills: 1 | Status: SHIPPED | OUTPATIENT
Start: 2022-01-05

## 2022-01-05 RX ORDER — BENZONATATE 200 MG/1
200 CAPSULE ORAL 3 TIMES DAILY PRN
Qty: 30 CAPSULE | Refills: 0 | Status: SHIPPED | OUTPATIENT
Start: 2022-01-05 | End: 2022-01-17 | Stop reason: ALTCHOICE

## 2022-01-05 NOTE — TELEPHONE ENCOUNTER
[address] calling regarding throat paste     She states the paste is typically not put on to the teeth   Please resend instructions

## 2022-01-05 NOTE — PROGRESS NOTES
This visit is conducted using Telemedicine with live, interactive video and audio.     Telehealth outside of [address] Verbal Consent   I conducted a telehealth visit with Sandhya Nguyễn today, 01/05/22, which was completed using two-way, real-elvira negative  Past medical, surgical and social history reviewed    Exam  alert, appears stated age and cooperative, Normocephalic, without obvious abnormality, atraumatic, lips, mucosa, and tongue normal; teeth and gums normal, Speaking in full sentences comf within 6 feet of someone who has COVID-19 for at least 15 minutes  * You provided care at home to someone who is sick with COVID-19  * You had direct physical contact with the person (touched, hugged, or kissed them)  * You shared eating or drinking utensi 4. If you have a medical appointment, call the healthcare provider ahead of time and tell them that you have or may have COVID-19.  5. For medical emergencies, call 911 and notify the dispatch personnel that you have or may have COVID-19.   6. Cover your and  · At least 10 days have passed since symptoms first appeared OR if asymptomatic patient or date of symptom onset is unclear then use 10 days post COVID test date.    · At least 20 days have passed for severe illness (requiring hospitalization) OR if yo donations.     If you would be interested in donating your plasma to help treat others diagnosed with the virus, please contact [facility] directly on their website: [email or web address]    Who is eligible to donate conval Increased Heart Rate Tingling, numbness, or burning sensation   Shortness of breath Hair loss   GI disturbances  Fever  Swelling Joint Pain  Cough         Who is at risk for a Post-COVID condition? It is still too early to say for sure.   Currently, we f

## 2022-01-24 DIAGNOSIS — G47.09 OTHER INSOMNIA: ICD-10-CM

## 2022-01-25 RX ORDER — AMITRIPTYLINE HYDROCHLORIDE 25 MG/1
TABLET, FILM COATED ORAL
Qty: 30 TABLET | Refills: 0 | Status: SHIPPED | OUTPATIENT
Start: 2022-01-25 | End: 2022-02-03

## 2022-02-02 RX ORDER — MIRABEGRON 50 MG/1
TABLET, FILM COATED, EXTENDED RELEASE ORAL
Qty: 30 TABLET | Refills: 0 | Status: SHIPPED | OUTPATIENT
Start: 2022-02-02

## 2022-02-03 ENCOUNTER — OFFICE VISIT (OUTPATIENT)
Dept: FAMILY MEDICINE CLINIC | Facility: CLINIC | Age: 67
End: 2022-02-03
Payer: COMMERCIAL

## 2022-02-03 ENCOUNTER — HOSPITAL ENCOUNTER (OUTPATIENT)
Dept: GENERAL RADIOLOGY | Age: 67
Discharge: HOME | End: 2022-02-03
Attending: FAMILY MEDICINE
Payer: COMMERCIAL

## 2022-02-03 VITALS
DIASTOLIC BLOOD PRESSURE: 74 MMHG | RESPIRATION RATE: 16 BRPM | OXYGEN SATURATION: 96 % | BODY MASS INDEX: 25.66 KG/M2 | WEIGHT: 154 LBS | SYSTOLIC BLOOD PRESSURE: 118 MMHG | HEART RATE: 86 BPM | HEIGHT: 65 IN

## 2022-02-03 DIAGNOSIS — E78.00 ELEVATED CHOLESTEROL: ICD-10-CM

## 2022-02-03 DIAGNOSIS — E04.9 GOITER: ICD-10-CM

## 2022-02-03 DIAGNOSIS — I10 ESSENTIAL HYPERTENSION: ICD-10-CM

## 2022-02-03 DIAGNOSIS — Z98.890: ICD-10-CM

## 2022-02-03 DIAGNOSIS — Z13.820 SCREENING FOR OSTEOPOROSIS: ICD-10-CM

## 2022-02-03 DIAGNOSIS — M25.532 LEFT WRIST PAIN: Primary | ICD-10-CM

## 2022-02-03 DIAGNOSIS — M25.532 LEFT WRIST PAIN: ICD-10-CM

## 2022-02-03 DIAGNOSIS — E03.9 HYPOTHYROIDISM, UNSPECIFIED TYPE: ICD-10-CM

## 2022-02-03 DIAGNOSIS — Z12.31 ENCOUNTER FOR SCREENING MAMMOGRAM FOR HIGH-RISK PATIENT: ICD-10-CM

## 2022-02-03 DIAGNOSIS — G47.09 OTHER INSOMNIA: ICD-10-CM

## 2022-02-03 DIAGNOSIS — Z00.00 GENERAL MEDICAL EXAM: ICD-10-CM

## 2022-02-03 PROCEDURE — 3078F DIAST BP <80 MM HG: CPT | Performed by: FAMILY MEDICINE

## 2022-02-03 PROCEDURE — 3074F SYST BP LT 130 MM HG: CPT | Performed by: FAMILY MEDICINE

## 2022-02-03 PROCEDURE — 3008F BODY MASS INDEX DOCD: CPT | Performed by: FAMILY MEDICINE

## 2022-02-03 PROCEDURE — 73110 X-RAY EXAM OF WRIST: CPT | Performed by: FAMILY MEDICINE

## 2022-02-03 PROCEDURE — 99214 OFFICE O/P EST MOD 30 MIN: CPT | Performed by: FAMILY MEDICINE

## 2022-02-03 RX ORDER — AMITRIPTYLINE HYDROCHLORIDE 25 MG/1
50 TABLET, FILM COATED ORAL EVERY EVENING
Qty: 180 TABLET | Refills: 0 | Status: SHIPPED | OUTPATIENT
Start: 2022-02-03

## 2022-02-03 RX ORDER — PREDNISONE 20 MG/1
40 TABLET ORAL DAILY
Qty: 8 TABLET | Refills: 0 | Status: SHIPPED | OUTPATIENT
Start: 2022-02-03 | End: 2022-02-07

## 2022-02-03 RX ORDER — SIMVASTATIN 20 MG
20 TABLET ORAL EVERY EVENING
Qty: 90 TABLET | Refills: 0 | Status: SHIPPED | OUTPATIENT
Start: 2022-02-03

## 2022-02-03 RX ORDER — LEVOTHYROXINE SODIUM 88 UG/1
88 TABLET ORAL DAILY
Qty: 90 TABLET | Refills: 0 | Status: SHIPPED | OUTPATIENT
Start: 2022-02-03

## 2022-03-01 ENCOUNTER — HOSPITAL ENCOUNTER (OUTPATIENT)
Dept: BONE DENSITY | Age: 67
Discharge: HOME | End: 2022-03-01
Attending: FAMILY MEDICINE
Payer: COMMERCIAL

## 2022-03-01 ENCOUNTER — HOSPITAL ENCOUNTER (OUTPATIENT)
Dept: MAMMOGRAPHY | Age: 67
Discharge: HOME | End: 2022-03-01
Attending: FAMILY MEDICINE
Payer: COMMERCIAL

## 2022-03-01 ENCOUNTER — LAB ENCOUNTER (OUTPATIENT)
Dept: LAB | Age: 67
End: 2022-03-01
Attending: FAMILY MEDICINE
Payer: COMMERCIAL

## 2022-03-01 ENCOUNTER — HOSPITAL ENCOUNTER (OUTPATIENT)
Dept: GENERAL RADIOLOGY | Age: 67
Discharge: HOME | End: 2022-03-01
Attending: FAMILY MEDICINE
Payer: COMMERCIAL

## 2022-03-01 ENCOUNTER — HOSPITAL ENCOUNTER (OUTPATIENT)
Dept: ULTRASOUND IMAGING | Age: 67
Discharge: HOME | End: 2022-03-01
Attending: FAMILY MEDICINE
Payer: COMMERCIAL

## 2022-03-01 DIAGNOSIS — E04.9 GOITER: ICD-10-CM

## 2022-03-01 DIAGNOSIS — Z00.00 GENERAL MEDICAL EXAM: ICD-10-CM

## 2022-03-01 DIAGNOSIS — Z98.890: ICD-10-CM

## 2022-03-01 DIAGNOSIS — Z12.31 ENCOUNTER FOR SCREENING MAMMOGRAM FOR HIGH-RISK PATIENT: ICD-10-CM

## 2022-03-01 DIAGNOSIS — Z13.820 SCREENING FOR OSTEOPOROSIS: ICD-10-CM

## 2022-03-01 LAB
ALBUMIN SERPL-MCNC: 4 G/DL (ref 3.4–5)
ALBUMIN/GLOB SERPL: 1.3 {RATIO} (ref 1–2)
ALP LIVER SERPL-CCNC: 111 U/L
ALT SERPL-CCNC: 29 U/L
ANION GAP SERPL CALC-SCNC: 4 MMOL/L (ref 0–18)
AST SERPL-CCNC: 30 U/L (ref 15–37)
BASOPHILS # BLD AUTO: 0.06 X10(3) UL (ref 0–0.2)
BASOPHILS NFR BLD AUTO: 0.8 %
BILIRUB SERPL-MCNC: 0.5 MG/DL (ref 0.1–2)
BUN BLD-MCNC: 19 MG/DL (ref 7–18)
CALCIUM BLD-MCNC: 9.2 MG/DL (ref 8.5–10.1)
CHLORIDE SERPL-SCNC: 105 MMOL/L (ref 98–112)
CHOLEST SERPL-MCNC: 226 MG/DL (ref ?–200)
CO2 SERPL-SCNC: 31 MMOL/L (ref 21–32)
CREAT BLD-MCNC: 1.1 MG/DL
EOSINOPHIL # BLD AUTO: 0.24 X10(3) UL (ref 0–0.7)
EOSINOPHIL NFR BLD AUTO: 3 %
ERYTHROCYTE [DISTWIDTH] IN BLOOD BY AUTOMATED COUNT: 14 %
FASTING PATIENT LIPID ANSWER: YES
FASTING STATUS PATIENT QL REPORTED: YES
GLOBULIN PLAS-MCNC: 3.2 G/DL (ref 2.8–4.4)
GLUCOSE BLD-MCNC: 79 MG/DL (ref 70–99)
HCT VFR BLD AUTO: 43.4 %
HDLC SERPL-MCNC: 82 MG/DL (ref 40–59)
HGB BLD-MCNC: 13.5 G/DL
IMM GRANULOCYTES # BLD AUTO: 0.03 X10(3) UL (ref 0–1)
IMM GRANULOCYTES NFR BLD: 0.4 %
LDLC SERPL CALC-MCNC: 128 MG/DL (ref ?–100)
LYMPHOCYTES # BLD AUTO: 1.81 X10(3) UL (ref 1–4)
LYMPHOCYTES NFR BLD AUTO: 23 %
MCH RBC QN AUTO: 28.8 PG (ref 26–34)
MCHC RBC AUTO-ENTMCNC: 31.1 G/DL (ref 31–37)
MCV RBC AUTO: 92.7 FL
MONOCYTES # BLD AUTO: 0.52 X10(3) UL (ref 0.1–1)
MONOCYTES NFR BLD AUTO: 6.6 %
NEUTROPHILS # BLD AUTO: 5.21 X10 (3) UL (ref 1.5–7.7)
NEUTROPHILS # BLD AUTO: 5.21 X10(3) UL (ref 1.5–7.7)
NEUTROPHILS NFR BLD AUTO: 66.2 %
NONHDLC SERPL-MCNC: 144 MG/DL (ref ?–130)
OSMOLALITY SERPL CALC.SUM OF ELEC: 291 MOSM/KG (ref 275–295)
PLATELET # BLD AUTO: 276 10(3)UL (ref 150–450)
POTASSIUM SERPL-SCNC: 4.8 MMOL/L (ref 3.5–5.1)
PROT SERPL-MCNC: 7.2 G/DL (ref 6.4–8.2)
RBC # BLD AUTO: 4.68 X10(6)UL
SODIUM SERPL-SCNC: 140 MMOL/L (ref 136–145)
T4 FREE SERPL-MCNC: 0.9 NG/DL (ref 0.8–1.7)
TRIGL SERPL-MCNC: 95 MG/DL (ref 30–149)
TSI SER-ACNC: 0.75 MIU/ML (ref 0.36–3.74)
VIT B12 SERPL-MCNC: 968 PG/ML (ref 193–986)
VLDLC SERPL CALC-MCNC: 17 MG/DL (ref 0–30)
WBC # BLD AUTO: 7.9 X10(3) UL (ref 4–11)

## 2022-03-01 PROCEDURE — 77067 SCR MAMMO BI INCL CAD: CPT | Performed by: FAMILY MEDICINE

## 2022-03-01 PROCEDURE — 84443 ASSAY THYROID STIM HORMONE: CPT

## 2022-03-01 PROCEDURE — 82306 VITAMIN D 25 HYDROXY: CPT

## 2022-03-01 PROCEDURE — 36415 COLL VENOUS BLD VENIPUNCTURE: CPT

## 2022-03-01 PROCEDURE — 80053 COMPREHEN METABOLIC PANEL: CPT

## 2022-03-01 PROCEDURE — 71046 X-RAY EXAM CHEST 2 VIEWS: CPT | Performed by: FAMILY MEDICINE

## 2022-03-01 PROCEDURE — 84439 ASSAY OF FREE THYROXINE: CPT

## 2022-03-01 PROCEDURE — 82607 VITAMIN B-12: CPT

## 2022-03-01 PROCEDURE — 80061 LIPID PANEL: CPT

## 2022-03-01 PROCEDURE — 77063 BREAST TOMOSYNTHESIS BI: CPT | Performed by: FAMILY MEDICINE

## 2022-03-01 PROCEDURE — 77080 DXA BONE DENSITY AXIAL: CPT | Performed by: FAMILY MEDICINE

## 2022-03-01 PROCEDURE — 76536 US EXAM OF HEAD AND NECK: CPT | Performed by: FAMILY MEDICINE

## 2022-03-01 PROCEDURE — 85025 COMPLETE CBC W/AUTO DIFF WBC: CPT

## 2022-03-02 LAB — VIT D+METAB SERPL-MCNC: 31.2 NG/ML (ref 30–100)

## 2022-03-14 ENCOUNTER — HOSPITAL ENCOUNTER (OUTPATIENT)
Dept: ULTRASOUND IMAGING | Age: 67
Discharge: HOME | End: 2022-03-14
Attending: FAMILY MEDICINE
Payer: COMMERCIAL

## 2022-03-14 ENCOUNTER — HOSPITAL ENCOUNTER (OUTPATIENT)
Dept: MAMMOGRAPHY | Age: 67
Discharge: HOME | End: 2022-03-14
Attending: FAMILY MEDICINE
Payer: COMMERCIAL

## 2022-03-14 DIAGNOSIS — R92.2 INCONCLUSIVE MAMMOGRAM: ICD-10-CM

## 2022-03-14 PROCEDURE — 77065 DX MAMMO INCL CAD UNI: CPT | Performed by: FAMILY MEDICINE

## 2022-03-14 PROCEDURE — 77061 BREAST TOMOSYNTHESIS UNI: CPT | Performed by: FAMILY MEDICINE

## 2022-03-14 PROCEDURE — 76642 ULTRASOUND BREAST LIMITED: CPT | Performed by: FAMILY MEDICINE

## 2022-03-16 ENCOUNTER — APPOINTMENT (OUTPATIENT)
Dept: GENERAL RADIOLOGY | Age: 67
End: 2022-03-16
Attending: NURSE PRACTITIONER
Payer: COMMERCIAL

## 2022-03-16 ENCOUNTER — HOSPITAL ENCOUNTER (OUTPATIENT)
Age: 67
Discharge: HOME | End: 2022-03-16
Payer: COMMERCIAL

## 2022-03-16 VITALS
SYSTOLIC BLOOD PRESSURE: 130 MMHG | RESPIRATION RATE: 16 BRPM | BODY MASS INDEX: 25.27 KG/M2 | TEMPERATURE: 99 F | HEART RATE: 73 BPM | OXYGEN SATURATION: 100 % | WEIGHT: 148 LBS | HEIGHT: 64 IN | DIASTOLIC BLOOD PRESSURE: 80 MMHG

## 2022-03-16 DIAGNOSIS — S20.211A CONTUSION OF RIB ON RIGHT SIDE, INITIAL ENCOUNTER: ICD-10-CM

## 2022-03-16 DIAGNOSIS — M25.532 WRIST PAIN, ACUTE, LEFT: ICD-10-CM
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
## 2022-03-16 DIAGNOSIS — M25.511 ACUTE PAIN OF RIGHT SHOULDER: ICD-10-CM

## 2022-03-16 PROCEDURE — 99214 OFFICE O/P EST MOD 30 MIN: CPT

## 2022-03-16 PROCEDURE — 71101 X-RAY EXAM UNILAT RIBS/CHEST: CPT | Performed by: NURSE PRACTITIONER

## 2022-03-16 PROCEDURE — 99213 OFFICE O/P EST LOW 20 MIN: CPT

## 2022-03-16 PROCEDURE — 73030 X-RAY EXAM OF SHOULDER: CPT | Performed by: NURSE PRACTITIONER

## 2022-03-16 PROCEDURE — 73110 X-RAY EXAM OF WRIST: CPT | Performed by: NURSE PRACTITIONER

## 2022-03-16 RX ORDER — ONDANSETRON 4 MG/1
4 TABLET, ORALLY DISINTEGRATING ORAL ONCE
Status: COMPLETED | OUTPATIENT
Start: 2022-03-16 | End: 2022-03-16

## 2022-03-16 RX ORDER — ACETAMINOPHEN 500 MG
1000 TABLET ORAL ONCE
Status: COMPLETED | OUTPATIENT
Start: 2022-03-16 | End: 2022-03-16

## 2022-03-16 NOTE — ED INITIAL ASSESSMENT (HPI)
Adwoa Lima last night as she was moving an iron deck chair. Pt missed a step and landed on the chair. C/o right shoulder/rib pain and left wrist pain. Denies head injury.

## 2022-03-21 ENCOUNTER — VIRTUAL PHONE E/M (OUTPATIENT)
Dept: FAMILY MEDICINE CLINIC | Facility: CLINIC | Age: 67
End: 2022-03-21
Payer: COMMERCIAL

## 2022-03-21 DIAGNOSIS — R05.9 COUGH: ICD-10-CM

## 2022-03-21 DIAGNOSIS — J01.00 ACUTE NON-RECURRENT MAXILLARY SINUSITIS: Primary | ICD-10-CM

## 2022-03-21 PROCEDURE — 99213 OFFICE O/P EST LOW 20 MIN: CPT | Performed by: FAMILY MEDICINE

## 2022-03-21 RX ORDER — BENZONATATE 200 MG/1
200 CAPSULE ORAL EVERY 8 HOURS PRN
Qty: 30 CAPSULE | Refills: 0 | Status: SHIPPED | OUTPATIENT
Start: 2022-03-21

## 2022-03-21 RX ORDER — AMOXICILLIN AND CLAVULANATE POTASSIUM 875; 125 MG/1; MG/1
1 TABLET, FILM COATED ORAL 2 TIMES DAILY
Qty: 20 TABLET | Refills: 0 | Status: SHIPPED | OUTPATIENT
Start: 2022-03-21 | End: 2022-03-31

## 2022-04-27 ENCOUNTER — OFFICE VISIT (OUTPATIENT)
Dept: RHEUMATOLOGY | Facility: CLINIC | Age: 67
End: 2022-04-27
Payer: COMMERCIAL

## 2022-04-27 VITALS
OXYGEN SATURATION: 97 % | HEIGHT: 64 IN | TEMPERATURE: 97 F | WEIGHT: 156 LBS | HEART RATE: 69 BPM | DIASTOLIC BLOOD PRESSURE: 78 MMHG | BODY MASS INDEX: 26.63 KG/M2 | SYSTOLIC BLOOD PRESSURE: 122 MMHG

## 2022-04-27 DIAGNOSIS — M35.9 UNDIFFERENTIATED CONNECTIVE TISSUE DISEASE (HCC): ICD-10-CM

## 2022-04-27 DIAGNOSIS — M85.852 OSTEOPENIA OF LEFT HIP: Primary | ICD-10-CM

## 2022-04-27 PROBLEM — R14.1 GAS PAIN: Status: RESOLVED | Noted: 2019-09-26 | Resolved: 2022-04-27

## 2022-04-27 PROBLEM — R10.13 EPIGASTRIC PAIN: Status: RESOLVED | Noted: 2019-09-26 | Resolved: 2022-04-27

## 2022-04-27 PROCEDURE — 3074F SYST BP LT 130 MM HG: CPT | Performed by: INTERNAL MEDICINE

## 2022-04-27 PROCEDURE — 3078F DIAST BP <80 MM HG: CPT | Performed by: INTERNAL MEDICINE

## 2022-04-27 PROCEDURE — 3008F BODY MASS INDEX DOCD: CPT | Performed by: INTERNAL MEDICINE

## 2022-04-27 PROCEDURE — 99214 OFFICE O/P EST MOD 30 MIN: CPT | Performed by: INTERNAL MEDICINE

## 2022-04-27 RX ORDER — NAPROXEN 500 MG/1
500 TABLET ORAL 2 TIMES DAILY WITH MEALS
Qty: 180 TABLET | Refills: 3 | Status: CANCELLED | OUTPATIENT
Start: 2022-04-27

## 2022-04-27 RX ORDER — HYDROXYCHLOROQUINE SULFATE 200 MG/1
200 TABLET, FILM COATED ORAL 2 TIMES DAILY
Qty: 180 TABLET | Refills: 3 | Status: SHIPPED | OUTPATIENT
Start: 2022-04-27

## 2022-04-27 NOTE — PATIENT INSTRUCTIONS
Being worse off Plaquenil. Therefore return to use of Plaquenil also known as hydroxychloroquine 200 mg twice a day. Well on hydroxychloroquine will need eye exam once a year. Continue to stay active. Walking is good exercise will help your osteopenia. For your osteopenia make sure you are taking calcium 500 mg once a day or drinking milk and eating foods like dairy products that have calcium. He will also take vitamin D and your multivitamin 400 units a day or calcium with vitamin D. Both calcium and vitamin D are needed to keep your bones strong. You can take over-the-counter Aleve or ibuprofen if needed for joint pain along with the Plaquenil. If you start to feel worse and the Plaquenil does not kick in, he usually takes Plaquenil about a month to start working again, and make an appointment see me sooner. If you doing well on the Plaquenil then return to office in 1 year.

## 2022-04-28 RX ORDER — MIRABEGRON 50 MG/1
TABLET, FILM COATED, EXTENDED RELEASE ORAL
Qty: 30 TABLET | Refills: 0 | OUTPATIENT
Start: 2022-04-28

## 2022-04-28 NOTE — TELEPHONE ENCOUNTER
Refill request for myrbetriq. LOV was greater than 1 year ago on 2/15/2021 and no future appt have been scheduled. Refill will be denied at this time and mcm sent.

## 2022-04-30 ENCOUNTER — LAB ENCOUNTER (OUTPATIENT)
Dept: LAB | Age: 67
End: 2022-04-30
Attending: INTERNAL MEDICINE
Payer: COMMERCIAL

## 2022-04-30 DIAGNOSIS — Z01.818 PRE-OP TESTING: ICD-10-CM

## 2022-05-01 LAB — SARS-COV-2 RNA RESP QL NAA+PROBE: NOT DETECTED

## 2022-05-03 PROBLEM — D12.0 BENIGN NEOPLASM OF CECUM: Status: ACTIVE | Noted: 2022-05-03

## 2022-05-03 PROBLEM — Z86.010 HISTORY OF ADENOMATOUS POLYP OF COLON: Status: ACTIVE | Noted: 2022-05-03

## 2022-05-03 PROBLEM — Z86.0101 HISTORY OF ADENOMATOUS POLYP OF COLON: Status: ACTIVE | Noted: 2022-05-03

## 2022-05-03 PROBLEM — D12.2 BENIGN NEOPLASM OF ASCENDING COLON: Status: ACTIVE | Noted: 2022-05-03

## 2022-05-04 RX ORDER — MIRABEGRON 50 MG/1
TABLET, FILM COATED, EXTENDED RELEASE ORAL
Qty: 30 TABLET | Refills: 0 | OUTPATIENT
Start: 2022-05-04

## 2022-05-04 NOTE — TELEPHONE ENCOUNTER
Refill request for myrbetriq. Last office visit was greater than 1 year ago 2/15/2021. No future visits have been made. Refill will be denied at this time and mcm sent to the pt.

## 2022-06-08 DIAGNOSIS — E78.00 ELEVATED CHOLESTEROL: ICD-10-CM

## 2022-06-08 DIAGNOSIS — E03.9 HYPOTHYROIDISM, UNSPECIFIED TYPE: ICD-10-CM

## 2022-06-08 RX ORDER — SIMVASTATIN 20 MG
TABLET ORAL
Qty: 90 TABLET | Refills: 0 | Status: SHIPPED | OUTPATIENT
Start: 2022-06-08

## 2022-06-08 RX ORDER — LEVOTHYROXINE SODIUM 88 UG/1
TABLET ORAL
Qty: 90 TABLET | Refills: 0 | Status: SHIPPED | OUTPATIENT
Start: 2022-06-08

## 2022-06-13 ENCOUNTER — APPOINTMENT (OUTPATIENT)
Dept: GENERAL RADIOLOGY | Age: 67
End: 2022-06-13
Attending: PHYSICIAN ASSISTANT
Payer: COMMERCIAL

## 2022-06-13 ENCOUNTER — HOSPITAL ENCOUNTER (OUTPATIENT)
Age: 67
Discharge: HOME | End: 2022-06-13
Payer: COMMERCIAL

## 2022-06-13 ENCOUNTER — TELEPHONE (OUTPATIENT)
Dept: FAMILY MEDICINE CLINIC | Facility: CLINIC | Age: 67
End: 2022-06-13

## 2022-06-13 VITALS
OXYGEN SATURATION: 100 % | HEIGHT: 64 IN | WEIGHT: 150 LBS | SYSTOLIC BLOOD PRESSURE: 161 MMHG | HEART RATE: 77 BPM | DIASTOLIC BLOOD PRESSURE: 86 MMHG | BODY MASS INDEX: 25.61 KG/M2 | TEMPERATURE: 97 F | RESPIRATION RATE: 18 BRPM

## 2022-06-13 DIAGNOSIS — S22.31XA CLOSED FRACTURE OF ONE RIB OF RIGHT SIDE, INITIAL ENCOUNTER: Primary | ICD-10-CM

## 2022-06-13 PROCEDURE — 99213 OFFICE O/P EST LOW 20 MIN: CPT

## 2022-06-13 PROCEDURE — 71101 X-RAY EXAM UNILAT RIBS/CHEST: CPT | Performed by: PHYSICIAN ASSISTANT

## 2022-06-13 PROCEDURE — 99214 OFFICE O/P EST MOD 30 MIN: CPT

## 2022-06-13 PROCEDURE — 73060 X-RAY EXAM OF HUMERUS: CPT | Performed by: PHYSICIAN ASSISTANT

## 2022-06-13 RX ORDER — HYDROCODONE BITARTRATE AND ACETAMINOPHEN 5; 325 MG/1; MG/1
1 TABLET ORAL EVERY 6 HOURS PRN
Qty: 10 TABLET | Refills: 0 | Status: SHIPPED | OUTPATIENT
Start: 2022-06-13 | End: 2022-06-18

## 2022-06-13 NOTE — TELEPHONE ENCOUNTER
Patient was calling requesting to see LE - states that she had a fall 3 weeks ago and that she is having still a hard time taking a deep breath on right side breast area. Please advise.

## 2022-06-13 NOTE — ED INITIAL ASSESSMENT (HPI)
Pt aox4. Pt c/o rt flank and rt arm pain x 3 weeks after falling and hitting rt side on counter top/cabinets. Pt states was carrying some items and tripped over the vacuum . Pt c/o pain with deep breathing and pain with movement to rt upper arm. Upon Rn assessment bruising noted to rt upper arm and swelling.

## 2022-06-29 DIAGNOSIS — R39.15 URINARY URGENCY: ICD-10-CM

## 2022-07-05 RX ORDER — MIRABEGRON 50 MG/1
TABLET, FILM COATED, EXTENDED RELEASE ORAL
Qty: 30 TABLET | Refills: 0 | OUTPATIENT
Start: 2022-07-05

## 2022-07-25 DIAGNOSIS — I10 ESSENTIAL HYPERTENSION: ICD-10-CM

## 2022-07-27 ENCOUNTER — OFFICE VISIT (OUTPATIENT)
Dept: SURGERY | Facility: CLINIC | Age: 67
End: 2022-07-27
Payer: COMMERCIAL

## 2022-07-27 DIAGNOSIS — N81.89 PELVIC FLOOR WEAKNESS: ICD-10-CM

## 2022-07-27 DIAGNOSIS — R82.90 URINE FINDING: ICD-10-CM

## 2022-07-27 DIAGNOSIS — N32.81 OAB (OVERACTIVE BLADDER): Primary | ICD-10-CM

## 2022-07-27 DIAGNOSIS — N39.41 URGE INCONTINENCE: ICD-10-CM

## 2022-07-27 LAB
APPEARANCE: CLEAR
BILIRUBIN: NEGATIVE
GLUCOSE (URINE DIPSTICK): NEGATIVE MG/DL
KETONES (URINE DIPSTICK): NEGATIVE MG/DL
MULTISTIX LOT#: ABNORMAL NUMERIC
NITRITE, URINE: NEGATIVE
PH, URINE: 5.5 (ref 4.5–8)
PROTEIN (URINE DIPSTICK): NEGATIVE MG/DL
SPECIFIC GRAVITY: <=1.005 (ref 1–1.03)
URINE-COLOR: YELLOW
UROBILINOGEN,SEMI-QN: 0.2 MG/DL (ref 0–1.9)

## 2022-07-27 PROCEDURE — 81003 URINALYSIS AUTO W/O SCOPE: CPT | Performed by: PHYSICIAN ASSISTANT

## 2022-07-27 PROCEDURE — 99213 OFFICE O/P EST LOW 20 MIN: CPT | Performed by: PHYSICIAN ASSISTANT

## 2022-07-27 RX ORDER — MIRABEGRON 50 MG/1
50 TABLET, FILM COATED, EXTENDED RELEASE ORAL DAILY
Qty: 90 TABLET | Refills: 5 | Status: SHIPPED | OUTPATIENT
Start: 2022-07-27

## 2022-07-27 RX ORDER — GARLIC EXTRACT 500 MG
1 CAPSULE ORAL DAILY
COMMUNITY

## 2022-07-27 RX ORDER — MAGNESIUM 200 MG
TABLET ORAL
COMMUNITY

## 2022-08-08 ENCOUNTER — TELEMEDICINE (OUTPATIENT)
Dept: FAMILY MEDICINE CLINIC | Facility: CLINIC | Age: 67
End: 2022-08-08
Payer: COMMERCIAL

## 2022-08-08 DIAGNOSIS — L01.00 IMPETIGO: ICD-10-CM

## 2022-08-08 DIAGNOSIS — L23.7 POISON IVY DERMATITIS: Primary | ICD-10-CM

## 2022-08-08 PROCEDURE — 99213 OFFICE O/P EST LOW 20 MIN: CPT | Performed by: FAMILY MEDICINE

## 2022-08-08 RX ORDER — CEPHALEXIN 500 MG/1
1000 CAPSULE ORAL 2 TIMES DAILY
Qty: 28 CAPSULE | Refills: 0 | Status: SHIPPED | OUTPATIENT
Start: 2022-08-08 | End: 2022-08-15

## 2022-08-08 RX ORDER — PREDNISONE 20 MG/1
60 TABLET ORAL DAILY
Qty: 15 TABLET | Refills: 0 | Status: SHIPPED | OUTPATIENT
Start: 2022-08-08 | End: 2022-08-13

## 2022-08-16 ENCOUNTER — TELEPHONE (OUTPATIENT)
Dept: FAMILY MEDICINE CLINIC | Facility: CLINIC | Age: 67
End: 2022-08-16

## 2022-08-16 NOTE — TELEPHONE ENCOUNTER
Spoke with patient for update, treated on 8/8/22 for poison ivy, on both forearms. Had 'hives' half way through antibiotic- finished antibiotic and prednisone. The 'hives' are only on the forearms where the poison ivy is. Denies drainage, states the areas are scabbed over. Wants to know if she should being applying anything? Leaving out of town tomorrow. Dr. Chana Tena, patient to send picture via my chart, should patient try topical cortisone 10 or prescription topical steroid?

## 2022-09-19 ENCOUNTER — OFFICE VISIT (OUTPATIENT)
Dept: FAMILY MEDICINE CLINIC | Facility: CLINIC | Age: 67
End: 2022-09-19
Payer: COMMERCIAL

## 2022-09-19 VITALS
DIASTOLIC BLOOD PRESSURE: 80 MMHG | SYSTOLIC BLOOD PRESSURE: 132 MMHG | WEIGHT: 160 LBS | BODY MASS INDEX: 27.31 KG/M2 | RESPIRATION RATE: 16 BRPM | HEIGHT: 64 IN | TEMPERATURE: 98 F | OXYGEN SATURATION: 99 % | HEART RATE: 74 BPM

## 2022-09-19 DIAGNOSIS — R39.9 UTI SYMPTOMS: Primary | ICD-10-CM

## 2022-09-19 PROCEDURE — 87086 URINE CULTURE/COLONY COUNT: CPT | Performed by: NURSE PRACTITIONER

## 2022-09-19 RX ORDER — CEPHALEXIN 500 MG/1
500 CAPSULE ORAL 2 TIMES DAILY
Qty: 14 CAPSULE | Refills: 0 | Status: SHIPPED | OUTPATIENT
Start: 2022-09-19 | End: 2022-09-26

## 2022-09-19 RX ORDER — PHENAZOPYRIDINE HYDROCHLORIDE 200 MG/1
200 TABLET, FILM COATED ORAL 3 TIMES DAILY PRN
Qty: 9 TABLET | Refills: 0 | Status: SHIPPED | OUTPATIENT
Start: 2022-09-19

## 2022-10-05 DIAGNOSIS — E03.9 HYPOTHYROIDISM, UNSPECIFIED TYPE: ICD-10-CM

## 2022-10-05 DIAGNOSIS — E78.00 ELEVATED CHOLESTEROL: ICD-10-CM

## 2022-10-05 RX ORDER — LEVOTHYROXINE SODIUM 88 UG/1
TABLET ORAL
Qty: 90 TABLET | Refills: 0 | Status: SHIPPED | OUTPATIENT
Start: 2022-10-05

## 2022-10-05 RX ORDER — SIMVASTATIN 20 MG
TABLET ORAL
Qty: 90 TABLET | Refills: 0 | Status: SHIPPED | OUTPATIENT
Start: 2022-10-05

## 2023-01-06 ENCOUNTER — LAB ENCOUNTER (OUTPATIENT)
Dept: LAB | Age: 68
End: 2023-01-06
Attending: FAMILY MEDICINE
Payer: COMMERCIAL

## 2023-01-06 ENCOUNTER — OFFICE VISIT (OUTPATIENT)
Dept: FAMILY MEDICINE CLINIC | Facility: CLINIC | Age: 68
End: 2023-01-06
Payer: COMMERCIAL

## 2023-01-06 VITALS
BODY MASS INDEX: 26.29 KG/M2 | DIASTOLIC BLOOD PRESSURE: 64 MMHG | RESPIRATION RATE: 16 BRPM | OXYGEN SATURATION: 97 % | HEART RATE: 78 BPM | SYSTOLIC BLOOD PRESSURE: 108 MMHG | WEIGHT: 154 LBS | HEIGHT: 64 IN

## 2023-01-06 DIAGNOSIS — L98.9 PRECANCEROUS SKIN LESION: ICD-10-CM

## 2023-01-06 DIAGNOSIS — Z01.818 PREOP EXAMINATION: ICD-10-CM

## 2023-01-06 DIAGNOSIS — Z01.818 PREOP EXAMINATION: Primary | ICD-10-CM

## 2023-01-06 LAB
ANION GAP SERPL CALC-SCNC: 2 MMOL/L (ref 0–18)
BASOPHILS # BLD AUTO: 0.06 X10(3) UL (ref 0–0.2)
BASOPHILS NFR BLD AUTO: 0.7 %
BUN BLD-MCNC: 19 MG/DL (ref 7–18)
CALCIUM BLD-MCNC: 9.5 MG/DL (ref 8.5–10.1)
CHLORIDE SERPL-SCNC: 105 MMOL/L (ref 98–112)
CO2 SERPL-SCNC: 30 MMOL/L (ref 21–32)
CREAT BLD-MCNC: 1.13 MG/DL
EOSINOPHIL # BLD AUTO: 0.21 X10(3) UL (ref 0–0.7)
EOSINOPHIL NFR BLD AUTO: 2.4 %
ERYTHROCYTE [DISTWIDTH] IN BLOOD BY AUTOMATED COUNT: 13.7 %
FASTING STATUS PATIENT QL REPORTED: YES
GFR SERPLBLD BASED ON 1.73 SQ M-ARVRAT: 54 ML/MIN/1.73M2 (ref 60–?)
GLUCOSE BLD-MCNC: 91 MG/DL (ref 70–99)
HCT VFR BLD AUTO: 45.2 %
HGB BLD-MCNC: 14.5 G/DL
IMM GRANULOCYTES # BLD AUTO: 0.02 X10(3) UL (ref 0–1)
IMM GRANULOCYTES NFR BLD: 0.2 %
LYMPHOCYTES # BLD AUTO: 1.73 X10(3) UL (ref 1–4)
LYMPHOCYTES NFR BLD AUTO: 19.4 %
MCH RBC QN AUTO: 29.2 PG (ref 26–34)
MCHC RBC AUTO-ENTMCNC: 32.1 G/DL (ref 31–37)
MCV RBC AUTO: 90.9 FL
MONOCYTES # BLD AUTO: 0.62 X10(3) UL (ref 0.1–1)
MONOCYTES NFR BLD AUTO: 7 %
NEUTROPHILS # BLD AUTO: 6.27 X10 (3) UL (ref 1.5–7.7)
NEUTROPHILS # BLD AUTO: 6.27 X10(3) UL (ref 1.5–7.7)
NEUTROPHILS NFR BLD AUTO: 70.3 %
OSMOLALITY SERPL CALC.SUM OF ELEC: 286 MOSM/KG (ref 275–295)
PLATELET # BLD AUTO: 330 10(3)UL (ref 150–450)
POTASSIUM SERPL-SCNC: 4.4 MMOL/L (ref 3.5–5.1)
RBC # BLD AUTO: 4.97 X10(6)UL
SODIUM SERPL-SCNC: 137 MMOL/L (ref 136–145)
WBC # BLD AUTO: 8.9 X10(3) UL (ref 4–11)

## 2023-01-06 PROCEDURE — 85025 COMPLETE CBC W/AUTO DIFF WBC: CPT

## 2023-01-06 PROCEDURE — 36415 COLL VENOUS BLD VENIPUNCTURE: CPT

## 2023-01-06 PROCEDURE — 80048 BASIC METABOLIC PNL TOTAL CA: CPT

## 2023-01-07 LAB — SARS-COV-2 RNA RESP QL NAA+PROBE: NOT DETECTED

## 2023-01-19 DIAGNOSIS — E03.9 HYPOTHYROIDISM, UNSPECIFIED TYPE: ICD-10-CM

## 2023-01-23 RX ORDER — LEVOTHYROXINE SODIUM 88 UG/1
TABLET ORAL
Qty: 90 TABLET | Refills: 0 | Status: SHIPPED | OUTPATIENT
Start: 2023-01-23

## 2023-02-12 DIAGNOSIS — E78.00 ELEVATED CHOLESTEROL: ICD-10-CM

## 2023-02-13 ENCOUNTER — TELEPHONE (OUTPATIENT)
Dept: RHEUMATOLOGY | Facility: CLINIC | Age: 68
End: 2023-02-13

## 2023-02-13 ENCOUNTER — LAB ENCOUNTER (OUTPATIENT)
Dept: LAB | Age: 68
End: 2023-02-13
Payer: COMMERCIAL

## 2023-02-13 DIAGNOSIS — Z01.818 PRE-OP TESTING: ICD-10-CM

## 2023-02-13 RX ORDER — SIMVASTATIN 20 MG
TABLET ORAL
Qty: 90 TABLET | Refills: 0 | Status: SHIPPED | OUTPATIENT
Start: 2023-02-13

## 2023-02-14 LAB — SARS-COV-2 RNA RESP QL NAA+PROBE: NOT DETECTED

## 2023-02-15 ENCOUNTER — OFFICE VISIT (OUTPATIENT)
Dept: FAMILY MEDICINE CLINIC | Facility: CLINIC | Age: 68
End: 2023-02-15
Payer: COMMERCIAL

## 2023-02-15 ENCOUNTER — TELEPHONE (OUTPATIENT)
Dept: FAMILY MEDICINE CLINIC | Facility: CLINIC | Age: 68
End: 2023-02-15

## 2023-02-15 VITALS
SYSTOLIC BLOOD PRESSURE: 132 MMHG | RESPIRATION RATE: 18 BRPM | DIASTOLIC BLOOD PRESSURE: 82 MMHG | WEIGHT: 157 LBS | TEMPERATURE: 99 F | HEART RATE: 73 BPM | OXYGEN SATURATION: 97 % | HEIGHT: 64 IN | BODY MASS INDEX: 26.8 KG/M2

## 2023-02-15 DIAGNOSIS — J02.9 SORE THROAT: Primary | ICD-10-CM

## 2023-02-15 LAB — CONTROL LINE PRESENT WITH A CLEAR BACKGROUND (YES/NO): YES YES/NO

## 2023-02-15 PROCEDURE — 87880 STREP A ASSAY W/OPTIC: CPT | Performed by: FAMILY MEDICINE

## 2023-02-15 PROCEDURE — 3008F BODY MASS INDEX DOCD: CPT | Performed by: FAMILY MEDICINE

## 2023-02-15 PROCEDURE — 99213 OFFICE O/P EST LOW 20 MIN: CPT | Performed by: FAMILY MEDICINE

## 2023-02-15 PROCEDURE — 3079F DIAST BP 80-89 MM HG: CPT | Performed by: FAMILY MEDICINE

## 2023-02-15 PROCEDURE — 3075F SYST BP GE 130 - 139MM HG: CPT | Performed by: FAMILY MEDICINE

## 2023-02-15 RX ORDER — AMOXICILLIN AND CLAVULANATE POTASSIUM 875; 125 MG/1; MG/1
1 TABLET, FILM COATED ORAL 2 TIMES DAILY
Qty: 20 TABLET | Refills: 0 | Status: SHIPPED | OUTPATIENT
Start: 2023-02-15 | End: 2023-02-25

## 2023-02-15 NOTE — TELEPHONE ENCOUNTER
Pt is having an endoscopy tomorrow   She has a sore throat and wants to discuss with the nurse     Please advise

## 2023-02-15 NOTE — PATIENT INSTRUCTIONS
Take antibiotics with food and plenty of water. Eat yogurt or take probiotic daily. (Layla Roberts is a good example of an OTC probiotic)  Make sure to finish the entire antibiotic treatment. Increase fluids and rest.   Use OTC meds for comfort as needed--  Ibuprofen/Tylenol for fever/pain  Use Benadryl at bedtime to reduce drainage and promote rest.  Zyrtec/Claritin/Allegra in the AM to reduce nasal drainage without sedation. Use saline nasal sprays to reduce congestion and thin secretions. Use Delsym for cough. Consider applying mirella's vapo-rub or eucayptus oil to chest and feet at bedtime to reduce chest and nasal congestion. Warm tea with honey, cough lozenges, vaporizers/steam etc.    Monitor symptoms and contact the office if no better in 2-3 days.

## 2023-02-15 NOTE — TELEPHONE ENCOUNTER
See previous message. Pt c/o cough x 2 weeks. Pt saw Dr Chang Escobar and endoscopy was scheduled for tomorrow. Pt states sore throat now and nasal congestion with yellow discharge. Covid test negative. Pt wants to know if okay to have procedure still? Pt states she will also call Chang Escobar office to let her know.

## 2023-02-26 DIAGNOSIS — G47.09 OTHER INSOMNIA: ICD-10-CM

## 2023-02-27 RX ORDER — AMITRIPTYLINE HYDROCHLORIDE 25 MG/1
TABLET, FILM COATED ORAL
Qty: 180 TABLET | Refills: 0 | Status: SHIPPED | OUTPATIENT
Start: 2023-02-27

## 2023-03-01 ENCOUNTER — HOSPITAL ENCOUNTER (OUTPATIENT)
Dept: GENERAL RADIOLOGY | Age: 68
Discharge: HOME | End: 2023-03-01
Attending: FAMILY MEDICINE
Payer: COMMERCIAL

## 2023-03-01 ENCOUNTER — OFFICE VISIT (OUTPATIENT)
Dept: FAMILY MEDICINE CLINIC | Facility: CLINIC | Age: 68
End: 2023-03-01
Payer: COMMERCIAL

## 2023-03-01 VITALS
HEART RATE: 87 BPM | WEIGHT: 161.38 LBS | HEIGHT: 64 IN | OXYGEN SATURATION: 97 % | SYSTOLIC BLOOD PRESSURE: 120 MMHG | DIASTOLIC BLOOD PRESSURE: 78 MMHG | BODY MASS INDEX: 27.55 KG/M2 | RESPIRATION RATE: 16 BRPM

## 2023-03-01 DIAGNOSIS — E04.9 GOITER: ICD-10-CM

## 2023-03-01 DIAGNOSIS — G47.09 OTHER INSOMNIA: ICD-10-CM

## 2023-03-01 DIAGNOSIS — N64.4 MASTODYNIA OF RIGHT BREAST: ICD-10-CM

## 2023-03-01 DIAGNOSIS — Z98.890: ICD-10-CM

## 2023-03-01 DIAGNOSIS — Z85.820 HISTORY OF MELANOMA: ICD-10-CM

## 2023-03-01 DIAGNOSIS — K59.09 CHRONIC CONSTIPATION: ICD-10-CM

## 2023-03-01 DIAGNOSIS — Z00.00 ANNUAL PHYSICAL EXAM: Primary | ICD-10-CM

## 2023-03-01 DIAGNOSIS — R63.5 WEIGHT GAIN: ICD-10-CM

## 2023-03-01 PROCEDURE — 3078F DIAST BP <80 MM HG: CPT | Performed by: FAMILY MEDICINE

## 2023-03-01 PROCEDURE — 90715 TDAP VACCINE 7 YRS/> IM: CPT | Performed by: FAMILY MEDICINE

## 2023-03-01 PROCEDURE — 99397 PER PM REEVAL EST PAT 65+ YR: CPT | Performed by: FAMILY MEDICINE

## 2023-03-01 PROCEDURE — 71046 X-RAY EXAM CHEST 2 VIEWS: CPT | Performed by: FAMILY MEDICINE

## 2023-03-01 PROCEDURE — 3008F BODY MASS INDEX DOCD: CPT | Performed by: FAMILY MEDICINE

## 2023-03-01 PROCEDURE — 3074F SYST BP LT 130 MM HG: CPT | Performed by: FAMILY MEDICINE

## 2023-03-01 PROCEDURE — 90471 IMMUNIZATION ADMIN: CPT | Performed by: FAMILY MEDICINE

## 2023-03-01 RX ORDER — DIAZEPAM 2 MG/1
2 TABLET ORAL NIGHTLY PRN
Qty: 30 TABLET | Refills: 0 | Status: SHIPPED | OUTPATIENT
Start: 2023-03-01

## 2023-03-10 ENCOUNTER — LAB ENCOUNTER (OUTPATIENT)
Dept: LAB | Age: 68
End: 2023-03-10
Attending: INTERNAL MEDICINE
Payer: COMMERCIAL

## 2023-03-10 DIAGNOSIS — Z01.818 PRE-OP TESTING: ICD-10-CM

## 2023-03-11 LAB — SARS-COV-2 RNA RESP QL NAA+PROBE: NOT DETECTED

## 2023-03-13 PROBLEM — K21.9 GERD (GASTROESOPHAGEAL REFLUX DISEASE): Status: ACTIVE | Noted: 2023-03-13

## 2023-03-13 PROBLEM — R11.12 PROJECTILE VOMITING: Status: ACTIVE | Noted: 2023-03-13

## 2023-03-28 ENCOUNTER — LAB ENCOUNTER (OUTPATIENT)
Dept: LAB | Age: 68
End: 2023-03-28
Attending: FAMILY MEDICINE
Payer: COMMERCIAL

## 2023-03-28 ENCOUNTER — HOSPITAL ENCOUNTER (OUTPATIENT)
Dept: ULTRASOUND IMAGING | Age: 68
Discharge: HOME | End: 2023-03-28
Attending: FAMILY MEDICINE
Payer: COMMERCIAL

## 2023-03-28 ENCOUNTER — HOSPITAL ENCOUNTER (OUTPATIENT)
Dept: MAMMOGRAPHY | Age: 68
Discharge: HOME | End: 2023-03-28
Attending: FAMILY MEDICINE
Payer: COMMERCIAL

## 2023-03-28 DIAGNOSIS — Z00.00 ANNUAL PHYSICAL EXAM: ICD-10-CM

## 2023-03-28 DIAGNOSIS — N64.4 MASTODYNIA OF RIGHT BREAST: ICD-10-CM

## 2023-03-28 DIAGNOSIS — E04.9 GOITER: ICD-10-CM

## 2023-03-28 LAB
ALBUMIN SERPL-MCNC: 3.7 G/DL (ref 3.4–5)
ALBUMIN/GLOB SERPL: 1.1 {RATIO} (ref 1–2)
ALP LIVER SERPL-CCNC: 108 U/L
ALT SERPL-CCNC: 22 U/L
ANION GAP SERPL CALC-SCNC: 0 MMOL/L (ref 0–18)
AST SERPL-CCNC: 19 U/L (ref 15–37)
BASOPHILS # BLD AUTO: 0.05 X10(3) UL (ref 0–0.2)
BASOPHILS NFR BLD AUTO: 0.8 %
BILIRUB SERPL-MCNC: 0.4 MG/DL (ref 0.1–2)
BUN BLD-MCNC: 20 MG/DL (ref 7–18)
CALCIUM BLD-MCNC: 8.9 MG/DL (ref 8.5–10.1)
CHLORIDE SERPL-SCNC: 109 MMOL/L (ref 98–112)
CHOLEST SERPL-MCNC: 209 MG/DL (ref ?–200)
CO2 SERPL-SCNC: 31 MMOL/L (ref 21–32)
CREAT BLD-MCNC: 1.09 MG/DL
EOSINOPHIL # BLD AUTO: 0.28 X10(3) UL (ref 0–0.7)
EOSINOPHIL NFR BLD AUTO: 4.7 %
ERYTHROCYTE [DISTWIDTH] IN BLOOD BY AUTOMATED COUNT: 13.8 %
EST. AVERAGE GLUCOSE BLD GHB EST-MCNC: 123 MG/DL (ref 68–126)
FASTING PATIENT LIPID ANSWER: YES
FASTING STATUS PATIENT QL REPORTED: YES
GFR SERPLBLD BASED ON 1.73 SQ M-ARVRAT: 56 ML/MIN/1.73M2 (ref 60–?)
GLOBULIN PLAS-MCNC: 3.3 G/DL (ref 2.8–4.4)
GLUCOSE BLD-MCNC: 99 MG/DL (ref 70–99)
HBA1C MFR BLD: 5.9 % (ref ?–5.7)
HCT VFR BLD AUTO: 41.5 %
HDLC SERPL-MCNC: 73 MG/DL (ref 40–59)
HGB BLD-MCNC: 13.1 G/DL
IMM GRANULOCYTES # BLD AUTO: 0.02 X10(3) UL (ref 0–1)
IMM GRANULOCYTES NFR BLD: 0.3 %
LDLC SERPL CALC-MCNC: 120 MG/DL (ref ?–100)
LYMPHOCYTES # BLD AUTO: 1.63 X10(3) UL (ref 1–4)
LYMPHOCYTES NFR BLD AUTO: 27.4 %
MCH RBC QN AUTO: 28.4 PG (ref 26–34)
MCHC RBC AUTO-ENTMCNC: 31.6 G/DL (ref 31–37)
MCV RBC AUTO: 89.8 FL
MONOCYTES # BLD AUTO: 0.49 X10(3) UL (ref 0.1–1)
MONOCYTES NFR BLD AUTO: 8.2 %
NEUTROPHILS # BLD AUTO: 3.47 X10 (3) UL (ref 1.5–7.7)
NEUTROPHILS # BLD AUTO: 3.47 X10(3) UL (ref 1.5–7.7)
NEUTROPHILS NFR BLD AUTO: 58.6 %
NONHDLC SERPL-MCNC: 136 MG/DL (ref ?–130)
OSMOLALITY SERPL CALC.SUM OF ELEC: 293 MOSM/KG (ref 275–295)
PLATELET # BLD AUTO: 286 10(3)UL (ref 150–450)
POTASSIUM SERPL-SCNC: 4.3 MMOL/L (ref 3.5–5.1)
PROT SERPL-MCNC: 7 G/DL (ref 6.4–8.2)
RBC # BLD AUTO: 4.62 X10(6)UL
SODIUM SERPL-SCNC: 140 MMOL/L (ref 136–145)
T4 FREE SERPL-MCNC: 1 NG/DL (ref 0.8–1.7)
TRIGL SERPL-MCNC: 91 MG/DL (ref 30–149)
TSI SER-ACNC: 0.5 MIU/ML (ref 0.36–3.74)
VIT B12 SERPL-MCNC: 670 PG/ML (ref 193–986)
VIT D+METAB SERPL-MCNC: 31.1 NG/ML (ref 30–100)
VLDLC SERPL CALC-MCNC: 16 MG/DL (ref 0–30)
WBC # BLD AUTO: 5.9 X10(3) UL (ref 4–11)

## 2023-03-28 PROCEDURE — 80061 LIPID PANEL: CPT

## 2023-03-28 PROCEDURE — 76536 US EXAM OF HEAD AND NECK: CPT | Performed by: FAMILY MEDICINE

## 2023-03-28 PROCEDURE — 84439 ASSAY OF FREE THYROXINE: CPT

## 2023-03-28 PROCEDURE — 82306 VITAMIN D 25 HYDROXY: CPT

## 2023-03-28 PROCEDURE — 77062 BREAST TOMOSYNTHESIS BI: CPT | Performed by: FAMILY MEDICINE

## 2023-03-28 PROCEDURE — 83036 HEMOGLOBIN GLYCOSYLATED A1C: CPT

## 2023-03-28 PROCEDURE — 77066 DX MAMMO INCL CAD BI: CPT | Performed by: FAMILY MEDICINE

## 2023-03-28 PROCEDURE — 36415 COLL VENOUS BLD VENIPUNCTURE: CPT

## 2023-03-28 PROCEDURE — 82607 VITAMIN B-12: CPT

## 2023-03-28 PROCEDURE — 76642 ULTRASOUND BREAST LIMITED: CPT | Performed by: FAMILY MEDICINE

## 2023-03-28 PROCEDURE — 84443 ASSAY THYROID STIM HORMONE: CPT

## 2023-03-28 PROCEDURE — 80053 COMPREHEN METABOLIC PANEL: CPT

## 2023-03-28 PROCEDURE — 85025 COMPLETE CBC W/AUTO DIFF WBC: CPT

## 2023-03-30 DIAGNOSIS — E78.00 ELEVATED CHOLESTEROL: Primary | ICD-10-CM

## 2023-03-30 DIAGNOSIS — R73.09 ELEVATED GLUCOSE: ICD-10-CM

## 2023-03-30 DIAGNOSIS — E55.9 VITAMIN D DEFICIENCY: ICD-10-CM

## 2023-04-03 DIAGNOSIS — E04.1 THYROID NODULE: Primary | ICD-10-CM

## 2023-04-05 ENCOUNTER — OFFICE VISIT (OUTPATIENT)
Dept: FAMILY MEDICINE CLINIC | Facility: CLINIC | Age: 68
End: 2023-04-05
Payer: COMMERCIAL

## 2023-04-05 VITALS
HEART RATE: 81 BPM | SYSTOLIC BLOOD PRESSURE: 124 MMHG | RESPIRATION RATE: 16 BRPM | HEIGHT: 64 IN | WEIGHT: 163 LBS | BODY MASS INDEX: 27.83 KG/M2 | DIASTOLIC BLOOD PRESSURE: 72 MMHG | OXYGEN SATURATION: 99 %

## 2023-04-05 DIAGNOSIS — R73.9 HYPERGLYCEMIA: Primary | ICD-10-CM

## 2023-04-05 DIAGNOSIS — E78.00 ELEVATED CHOLESTEROL: ICD-10-CM

## 2023-04-05 PROCEDURE — 99213 OFFICE O/P EST LOW 20 MIN: CPT | Performed by: FAMILY MEDICINE

## 2023-04-05 PROCEDURE — 3074F SYST BP LT 130 MM HG: CPT | Performed by: FAMILY MEDICINE

## 2023-04-05 PROCEDURE — 3078F DIAST BP <80 MM HG: CPT | Performed by: FAMILY MEDICINE

## 2023-04-05 PROCEDURE — 3008F BODY MASS INDEX DOCD: CPT | Performed by: FAMILY MEDICINE

## 2023-05-10 ENCOUNTER — OFFICE VISIT (OUTPATIENT)
Dept: RHEUMATOLOGY | Facility: CLINIC | Age: 68
End: 2023-05-10
Payer: COMMERCIAL

## 2023-05-10 VITALS
RESPIRATION RATE: 14 BRPM | WEIGHT: 164 LBS | HEIGHT: 64 IN | OXYGEN SATURATION: 98 % | BODY MASS INDEX: 28 KG/M2 | HEART RATE: 60 BPM | DIASTOLIC BLOOD PRESSURE: 80 MMHG | SYSTOLIC BLOOD PRESSURE: 128 MMHG

## 2023-05-10 DIAGNOSIS — R76.8 POSITIVE ANA (ANTINUCLEAR ANTIBODY): Primary | ICD-10-CM

## 2023-05-10 DIAGNOSIS — M35.9 UNDIFFERENTIATED CONNECTIVE TISSUE DISEASE (HCC): ICD-10-CM

## 2023-05-10 DIAGNOSIS — K58.1 IRRITABLE BOWEL SYNDROME WITH CONSTIPATION: ICD-10-CM

## 2023-05-10 PROCEDURE — 3074F SYST BP LT 130 MM HG: CPT | Performed by: INTERNAL MEDICINE

## 2023-05-10 PROCEDURE — 3008F BODY MASS INDEX DOCD: CPT | Performed by: INTERNAL MEDICINE

## 2023-05-10 PROCEDURE — 99214 OFFICE O/P EST MOD 30 MIN: CPT | Performed by: INTERNAL MEDICINE

## 2023-05-10 PROCEDURE — 3079F DIAST BP 80-89 MM HG: CPT | Performed by: INTERNAL MEDICINE

## 2023-05-10 RX ORDER — HYDROXYCHLOROQUINE SULFATE 200 MG/1
200 TABLET, FILM COATED ORAL 2 TIMES DAILY
Qty: 180 TABLET | Refills: 3 | Status: SHIPPED | OUTPATIENT
Start: 2023-05-10

## 2023-05-10 NOTE — PATIENT INSTRUCTIONS
Plaquenil 200 mg twice a day. Eye exam yearly. For mild pain ES Tylenol up to 4 a dy. For severe pain consider CBD gummies 1-2 a day to start. Add THC to the CBD if CBD alone does nothing. Low dose narcotic - Tramadol is an option. Cymbalta aka duloxetine is another option for arthritis treatment - an antidepressnat/mood elevator to help pain. Exercise walking, yoga. Return to office 1 year.

## 2023-05-17 ENCOUNTER — TELEPHONE (OUTPATIENT)
Dept: FAMILY MEDICINE CLINIC | Facility: CLINIC | Age: 68
End: 2023-05-17

## 2023-05-17 DIAGNOSIS — G47.09 OTHER INSOMNIA: ICD-10-CM

## 2023-05-17 NOTE — TELEPHONE ENCOUNTER
Stopped taking it last Thursday -   AMITRIPTYLINE 25 MG Oral Tab -    Informed her that she was not to just go off of it and that she was to wean. Is there a replacement medication she can try as she was just prescribed lactulose and she is not to be on amitrityline. Please advise.

## 2023-05-17 NOTE — TELEPHONE ENCOUNTER
Please review previous message. pt stopped   AMITRIPTYLINE 25 MG Oral Tab per GI  And is wondering if there is a replacement? Please advise.

## 2023-05-18 RX ORDER — QUETIAPINE FUMARATE 25 MG/1
25 TABLET, FILM COATED ORAL NIGHTLY
Qty: 30 TABLET | Refills: 0 | Status: SHIPPED | OUTPATIENT
Start: 2023-05-18

## 2023-05-18 NOTE — TELEPHONE ENCOUNTER
Patient notified, verbalized understanding. Patient to update us PRN but she wanted to know if this will cause constipation.

## 2023-05-22 DIAGNOSIS — I10 ESSENTIAL HYPERTENSION: ICD-10-CM

## 2023-05-22 DIAGNOSIS — E03.9 HYPOTHYROIDISM, UNSPECIFIED TYPE: ICD-10-CM

## 2023-05-22 RX ORDER — LEVOTHYROXINE SODIUM 88 UG/1
TABLET ORAL
Qty: 90 TABLET | Refills: 0 | Status: SHIPPED | OUTPATIENT
Start: 2023-05-22

## 2023-06-20 ENCOUNTER — OFFICE VISIT (OUTPATIENT)
Facility: LOCATION | Age: 68
End: 2023-06-20
Payer: COMMERCIAL

## 2023-06-20 VITALS — TEMPERATURE: 98 F | HEART RATE: 68 BPM

## 2023-06-20 DIAGNOSIS — K59.01 SLOW TRANSIT CONSTIPATION: Primary | ICD-10-CM

## 2023-06-20 DIAGNOSIS — Z86.73 HISTORY OF STROKE: ICD-10-CM

## 2023-06-20 PROCEDURE — 99204 OFFICE O/P NEW MOD 45 MIN: CPT | Performed by: COLON & RECTAL SURGERY

## 2023-06-21 PROBLEM — Z86.73 HISTORY OF CARDIOEMBOLIC CEREBROVASCULAR ACCIDENT (CVA): Status: ACTIVE | Noted: 2023-06-21

## 2023-06-21 PROBLEM — Z86.73 HISTORY OF CARDIOEMBOLIC CEREBROVASCULAR ACCIDENT (CVA): Status: RESOLVED | Noted: 2023-06-21 | Resolved: 2023-06-21

## 2023-06-21 PROBLEM — K59.01 SLOW TRANSIT CONSTIPATION: Status: ACTIVE | Noted: 2023-06-21

## 2023-06-21 PROBLEM — Z86.73 HISTORY OF STROKE: Status: ACTIVE | Noted: 2023-06-21

## 2023-07-12 DIAGNOSIS — E78.00 ELEVATED CHOLESTEROL: ICD-10-CM

## 2023-07-12 RX ORDER — SIMVASTATIN 20 MG
20 TABLET ORAL EVERY EVENING
Qty: 90 TABLET | Refills: 0 | Status: SHIPPED | OUTPATIENT
Start: 2023-07-12

## 2023-08-04 ENCOUNTER — LAB ENCOUNTER (OUTPATIENT)
Dept: LAB | Age: 68
End: 2023-08-04
Attending: FAMILY MEDICINE
Payer: COMMERCIAL

## 2023-08-04 DIAGNOSIS — R73.09 ELEVATED GLUCOSE: ICD-10-CM

## 2023-08-04 DIAGNOSIS — E55.9 VITAMIN D DEFICIENCY: ICD-10-CM

## 2023-08-04 DIAGNOSIS — E78.00 ELEVATED CHOLESTEROL: ICD-10-CM

## 2023-08-04 DIAGNOSIS — R73.9 HYPERGLYCEMIA: ICD-10-CM

## 2023-08-04 LAB
ALBUMIN SERPL-MCNC: 4 G/DL (ref 3.4–5)
ALBUMIN/GLOB SERPL: 1.2 {RATIO} (ref 1–2)
ALP LIVER SERPL-CCNC: 97 U/L
ALT SERPL-CCNC: 23 U/L
ANION GAP SERPL CALC-SCNC: 3 MMOL/L (ref 0–18)
AST SERPL-CCNC: 22 U/L (ref 15–37)
BILIRUB SERPL-MCNC: 0.5 MG/DL (ref 0.1–2)
BUN BLD-MCNC: 17 MG/DL (ref 7–18)
CALCIUM BLD-MCNC: 9.2 MG/DL (ref 8.5–10.1)
CHLORIDE SERPL-SCNC: 109 MMOL/L (ref 98–112)
CHOLEST SERPL-MCNC: 216 MG/DL (ref ?–200)
CO2 SERPL-SCNC: 27 MMOL/L (ref 21–32)
CREAT BLD-MCNC: 1.26 MG/DL
EGFRCR SERPLBLD CKD-EPI 2021: 47 ML/MIN/1.73M2 (ref 60–?)
EST. AVERAGE GLUCOSE BLD GHB EST-MCNC: 131 MG/DL (ref 68–126)
FASTING PATIENT LIPID ANSWER: YES
FASTING STATUS PATIENT QL REPORTED: YES
GLOBULIN PLAS-MCNC: 3.3 G/DL (ref 2.8–4.4)
GLUCOSE BLD-MCNC: 100 MG/DL (ref 70–99)
HBA1C MFR BLD: 6.2 % (ref ?–5.7)
HDLC SERPL-MCNC: 66 MG/DL (ref 40–59)
LDLC SERPL CALC-MCNC: 127 MG/DL (ref ?–100)
NONHDLC SERPL-MCNC: 150 MG/DL (ref ?–130)
OSMOLALITY SERPL CALC.SUM OF ELEC: 290 MOSM/KG (ref 275–295)
POTASSIUM SERPL-SCNC: 4.4 MMOL/L (ref 3.5–5.1)
PROT SERPL-MCNC: 7.3 G/DL (ref 6.4–8.2)
SODIUM SERPL-SCNC: 139 MMOL/L (ref 136–145)
TRIGL SERPL-MCNC: 134 MG/DL (ref 30–149)
VIT D+METAB SERPL-MCNC: 46.3 NG/ML (ref 30–100)
VLDLC SERPL CALC-MCNC: 24 MG/DL (ref 0–30)

## 2023-08-04 PROCEDURE — 83036 HEMOGLOBIN GLYCOSYLATED A1C: CPT | Performed by: FAMILY MEDICINE

## 2023-08-04 PROCEDURE — 80053 COMPREHEN METABOLIC PANEL: CPT | Performed by: FAMILY MEDICINE

## 2023-08-04 PROCEDURE — 82306 VITAMIN D 25 HYDROXY: CPT | Performed by: FAMILY MEDICINE

## 2023-08-04 PROCEDURE — 80061 LIPID PANEL: CPT | Performed by: FAMILY MEDICINE

## 2023-08-09 ENCOUNTER — LAB ENCOUNTER (OUTPATIENT)
Dept: LAB | Age: 68
End: 2023-08-09
Attending: FAMILY MEDICINE
Payer: COMMERCIAL

## 2023-08-09 ENCOUNTER — OFFICE VISIT (OUTPATIENT)
Dept: FAMILY MEDICINE CLINIC | Facility: CLINIC | Age: 68
End: 2023-08-09
Payer: COMMERCIAL

## 2023-08-09 VITALS
HEART RATE: 70 BPM | HEIGHT: 65 IN | RESPIRATION RATE: 16 BRPM | SYSTOLIC BLOOD PRESSURE: 126 MMHG | DIASTOLIC BLOOD PRESSURE: 84 MMHG | WEIGHT: 162 LBS | BODY MASS INDEX: 26.99 KG/M2 | OXYGEN SATURATION: 98 %

## 2023-08-09 DIAGNOSIS — Z82.61 FAMILY HISTORY OF RHEUMATOID ARTHRITIS: ICD-10-CM

## 2023-08-09 DIAGNOSIS — R73.9 HYPERGLYCEMIA: ICD-10-CM

## 2023-08-09 DIAGNOSIS — M79.642 BILATERAL HAND PAIN: ICD-10-CM

## 2023-08-09 DIAGNOSIS — E78.00 ELEVATED CHOLESTEROL: ICD-10-CM

## 2023-08-09 DIAGNOSIS — M79.641 BILATERAL HAND PAIN: Primary | ICD-10-CM

## 2023-08-09 DIAGNOSIS — M79.642 BILATERAL HAND PAIN: Primary | ICD-10-CM

## 2023-08-09 DIAGNOSIS — M79.641 BILATERAL HAND PAIN: ICD-10-CM

## 2023-08-09 DIAGNOSIS — K59.09 CHRONIC CONSTIPATION: ICD-10-CM

## 2023-08-09 DIAGNOSIS — E03.9 HYPOTHYROIDISM, UNSPECIFIED TYPE: ICD-10-CM

## 2023-08-09 LAB
CRP SERPL-MCNC: <0.29 MG/DL (ref ?–0.3)
ERYTHROCYTE [SEDIMENTATION RATE] IN BLOOD: 15 MM/HR

## 2023-08-09 PROCEDURE — 86200 CCP ANTIBODY: CPT | Performed by: FAMILY MEDICINE

## 2023-08-09 PROCEDURE — 3079F DIAST BP 80-89 MM HG: CPT | Performed by: FAMILY MEDICINE

## 2023-08-09 PROCEDURE — 3008F BODY MASS INDEX DOCD: CPT | Performed by: FAMILY MEDICINE

## 2023-08-09 PROCEDURE — 86038 ANTINUCLEAR ANTIBODIES: CPT | Performed by: FAMILY MEDICINE

## 2023-08-09 PROCEDURE — 86225 DNA ANTIBODY NATIVE: CPT | Performed by: FAMILY MEDICINE

## 2023-08-09 PROCEDURE — 86431 RHEUMATOID FACTOR QUANT: CPT | Performed by: FAMILY MEDICINE

## 2023-08-09 PROCEDURE — 85652 RBC SED RATE AUTOMATED: CPT | Performed by: FAMILY MEDICINE

## 2023-08-09 PROCEDURE — 86140 C-REACTIVE PROTEIN: CPT | Performed by: FAMILY MEDICINE

## 2023-08-09 PROCEDURE — 99214 OFFICE O/P EST MOD 30 MIN: CPT | Performed by: FAMILY MEDICINE

## 2023-08-09 PROCEDURE — 3074F SYST BP LT 130 MM HG: CPT | Performed by: FAMILY MEDICINE

## 2023-08-10 ENCOUNTER — HOSPITAL ENCOUNTER (OUTPATIENT)
Dept: GENERAL RADIOLOGY | Age: 68
Discharge: HOME | End: 2023-08-10
Attending: FAMILY MEDICINE
Payer: COMMERCIAL

## 2023-08-10 ENCOUNTER — TELEPHONE (OUTPATIENT)
Dept: FAMILY MEDICINE CLINIC | Facility: CLINIC | Age: 68
End: 2023-08-10

## 2023-08-10 DIAGNOSIS — Z82.61 FAMILY HISTORY OF RHEUMATOID ARTHRITIS: ICD-10-CM

## 2023-08-10 DIAGNOSIS — M79.642 BILATERAL HAND PAIN: ICD-10-CM

## 2023-08-10 DIAGNOSIS — M79.641 BILATERAL HAND PAIN: ICD-10-CM

## 2023-08-10 LAB
CCP IGG SERPL-ACNC: 1.3 U/ML (ref 0–6.9)
DSDNA IGG SERPL IA-ACNC: 4.3 IU/ML
ENA AB SER QL IA: <0.09 UG/L
ENA AB SER QL IA: NEGATIVE
RHEUMATOID FACT SERPL-ACNC: <10 IU/ML (ref ?–15)

## 2023-08-10 PROCEDURE — 73130 X-RAY EXAM OF HAND: CPT | Performed by: FAMILY MEDICINE

## 2023-08-10 NOTE — TELEPHONE ENCOUNTER
Pt wants to let Dr. Vladimir Campos know that sh has been using a couple drops of CBD oil in the evening under her tongue  She just wants to make sure that does not affect her labs

## 2023-08-10 NOTE — TELEPHONE ENCOUNTER
Noted. INTERVAL HPI/OVERNIGHT EVENTS:  62 year old woman with PMH HTN, history of stomach CA s/p subtotal gastrectomy 5 years ago, and thyroid cancer s/p surgery 17 years ago (neither active as per family)Cervical spinal lesion (unclear etiology, followed q 1 month with MRI as per family) and bilateral LE weakness with neuropathy, with limited mobility. Presented with complaint of b/l leg swelling for unknown amount of time.  Family states that they noticed the patient's feet were swollen for the last few weeks and advised her to see her doctor but the patient had trouble leaving her home due to her LE weakness.  She also complains of cough with pleuritic chest pain x 1 week.  She denies fever, chills, wheezing, SOB.    Found to have extensive right leg DVT and right lung  mass S/P IVC filter and Bronchoscopy on 03/03/17.  Reports having cough, has underlying Acid reflex and now feels having post nasal drip.    Vital Signs Last 24 Hrs  T(C): 36.7, Max: 38.2 (03-05 @ 04:55)  T(F): 98.1, Max: 100.8 (03-05 @ 04:55)  HR: 98 (72 - 98)  BP: 122/68 (102/62 - 123/42)  BP(mean): --  RR: 18 (18 - 20)  SpO2: 98% (94% - 98%)    PHYSICAL EXAM:  GEN:        Awake, responsive and comfortable.  HEENT:    Normal.    RESP:     no wheezing  CVS:         Regular rate and rhythm.   ABD:         Soft, non-tender, non-distended;   :           No costovertebral angle tenderness  EXTR:         No clubbing, cyanosis or edema  CNS:           Intact sensory and motor function.    MEDICATIONS  (STANDING):  influenza   Vaccine 0.5milliLiter(s) IntraMuscular once  gabapentin 600milliGRAM(s) Oral three times a day  LORazepam     Tablet 0.5milliGRAM(s) Oral three times a day  amLODIPine   Tablet 10milliGRAM(s) Oral daily  sucralfate suspension 1Gram(s) Oral three times a day  levothyroxine 200MICROGram(s) Oral daily  piperacillin/tazobactam IVPB. 3.375Gram(s) IV Intermittent every 8 hours  rivaroxaban 15milliGRAM(s) Oral <User Schedule>  magnesium oxide 400milliGRAM(s) Oral three times a day with meals    MEDICATIONS  (PRN):  oxyCODONE  5 mG/acetaminophen 325 mG 1Tablet(s) Oral every 6 hours PRN Severe Pain (7 - 10)  acetaminophen   Tablet 650milliGRAM(s) Oral every 6 hours PRN For Temp greater than 38 C (100.4 F)  guaiFENesin    Syrup 200milliGRAM(s) Oral every 6 hours PRN Cough  benzonatate 100milliGRAM(s) Oral three times a day PRN Cough    LABS:                        11.0   10.5  )-----------( 303      ( 05 Mar 2017 07:35 )             32.9     05 Mar 2017 07:35    140    |  104    |  11     ----------------------------<  79     3.4     |  27     |  0.42     Ca    7.1        05 Mar 2017 07:35  Phos  1.4       05 Mar 2017 07:35  Mg     1.9       05 Mar 2017 07:35    ASSESSMENT AND PLAN:  ·	Right lung mass. S/P Bronchoscopy on 03/03/17.  ·	Left leg DVT ( greater saphenous popliteal, femoral)  ·	Pleural effusion.  ·	Gastric CA history.  ·	Thyroid CA history.  ·	HTN.  ·	Anemia.  ·	Hypokalemia.  ·	Fever.  ·	Cough    Continue supportive care.  Will add Flonase for post nasal drip.

## 2023-08-11 ENCOUNTER — TELEPHONE (OUTPATIENT)
Dept: FAMILY MEDICINE CLINIC | Facility: CLINIC | Age: 68
End: 2023-08-11

## 2023-08-11 NOTE — TELEPHONE ENCOUNTER
Patient stopped at  to drop off form to be filled out by the office. Form is Dietetics with [employer]  Needs ICD-10 Codes. Put in LE Triage Tray.

## 2023-08-14 NOTE — TELEPHONE ENCOUNTER
Task complete. Form signed per Dr Bonita John and faxed to Dietetics with [employer] at [phone]. Copy to scan and copy in triage folder.

## 2023-09-06 DIAGNOSIS — E03.9 HYPOTHYROIDISM, UNSPECIFIED TYPE: ICD-10-CM

## 2023-09-06 RX ORDER — LEVOTHYROXINE SODIUM 88 UG/1
88 TABLET ORAL DAILY
Qty: 90 TABLET | Refills: 0 | Status: SHIPPED | OUTPATIENT
Start: 2023-09-06

## 2023-09-21 ENCOUNTER — PATIENT MESSAGE (OUTPATIENT)
Dept: FAMILY MEDICINE CLINIC | Facility: CLINIC | Age: 68
End: 2023-09-21

## 2023-09-21 DIAGNOSIS — E78.00 ELEVATED CHOLESTEROL: Primary | ICD-10-CM

## 2023-09-22 ENCOUNTER — OFFICE VISIT (OUTPATIENT)
Dept: FAMILY MEDICINE CLINIC | Facility: CLINIC | Age: 68
End: 2023-09-22
Payer: COMMERCIAL

## 2023-09-22 VITALS
OXYGEN SATURATION: 98 % | SYSTOLIC BLOOD PRESSURE: 128 MMHG | DIASTOLIC BLOOD PRESSURE: 90 MMHG | RESPIRATION RATE: 16 BRPM | WEIGHT: 159.38 LBS | TEMPERATURE: 98 F | BODY MASS INDEX: 27 KG/M2 | HEART RATE: 59 BPM

## 2023-09-22 DIAGNOSIS — R30.9 PAINFUL URINATION: Primary | ICD-10-CM

## 2023-09-22 DIAGNOSIS — N10 ACUTE PYELONEPHRITIS: ICD-10-CM

## 2023-09-22 DIAGNOSIS — R31.9 HEMATURIA, UNSPECIFIED TYPE: ICD-10-CM

## 2023-09-22 LAB
APPEARANCE: CLEAR
BILIRUBIN: NEGATIVE
GLUCOSE (URINE DIPSTICK): NEGATIVE MG/DL
KETONES (URINE DIPSTICK): NEGATIVE MG/DL
MULTISTIX LOT#: ABNORMAL NUMERIC
NITRITE, URINE: NEGATIVE
PH, URINE: 7 (ref 4.5–8)
SPECIFIC GRAVITY: 1.02 (ref 1–1.03)
URINE-COLOR: YELLOW
UROBILINOGEN,SEMI-QN: 0.2 MG/DL (ref 0–1.9)

## 2023-09-22 PROCEDURE — 3080F DIAST BP >= 90 MM HG: CPT | Performed by: FAMILY MEDICINE

## 2023-09-22 PROCEDURE — 99213 OFFICE O/P EST LOW 20 MIN: CPT | Performed by: FAMILY MEDICINE

## 2023-09-22 PROCEDURE — 3074F SYST BP LT 130 MM HG: CPT | Performed by: FAMILY MEDICINE

## 2023-09-22 PROCEDURE — 87086 URINE CULTURE/COLONY COUNT: CPT | Performed by: FAMILY MEDICINE

## 2023-09-22 PROCEDURE — 81003 URINALYSIS AUTO W/O SCOPE: CPT | Performed by: FAMILY MEDICINE

## 2023-09-22 RX ORDER — PHENAZOPYRIDINE HYDROCHLORIDE 100 MG/1
100 TABLET, FILM COATED ORAL 3 TIMES DAILY PRN
Qty: 15 TABLET | Refills: 0 | Status: SHIPPED | OUTPATIENT
Start: 2023-09-22

## 2023-09-22 RX ORDER — SULFAMETHOXAZOLE AND TRIMETHOPRIM 800; 160 MG/1; MG/1
1 TABLET ORAL 2 TIMES DAILY
Qty: 10 TABLET | Refills: 0 | Status: SHIPPED | OUTPATIENT
Start: 2023-09-22 | End: 2023-09-27

## 2023-09-25 ENCOUNTER — TELEPHONE (OUTPATIENT)
Dept: FAMILY MEDICINE CLINIC | Facility: CLINIC | Age: 68
End: 2023-09-25

## 2023-09-25 DIAGNOSIS — R31.9 HEMATURIA, UNSPECIFIED TYPE: ICD-10-CM

## 2023-09-25 DIAGNOSIS — R30.9 PAINFUL URINATION: Primary | ICD-10-CM

## 2023-09-26 RX ORDER — CIPROFLOXACIN 500 MG/1
500 TABLET, FILM COATED ORAL 2 TIMES DAILY
Qty: 10 TABLET | Refills: 0 | Status: SHIPPED | OUTPATIENT
Start: 2023-09-26 | End: 2023-10-01

## 2023-09-26 NOTE — TELEPHONE ENCOUNTER
Van Van MD  [facility] Clinical Staff  Caller: Unspecified (Yesterday,  4:02 PM)  Culture unremarkable. Allergic to macrobid. On sulfa. Change to cipro 500 mg bid for 5 days and order ultrasound kidney and bladder.

## 2023-10-05 ENCOUNTER — HOSPITAL ENCOUNTER (OUTPATIENT)
Dept: ULTRASOUND IMAGING | Age: 68
Discharge: HOME | End: 2023-10-05
Attending: FAMILY MEDICINE
Payer: COMMERCIAL

## 2023-10-05 DIAGNOSIS — R31.9 HEMATURIA, UNSPECIFIED TYPE: ICD-10-CM

## 2023-10-05 DIAGNOSIS — R30.9 PAINFUL URINATION: ICD-10-CM

## 2023-10-05 PROCEDURE — 76770 US EXAM ABDO BACK WALL COMP: CPT | Performed by: FAMILY MEDICINE

## 2023-10-09 ENCOUNTER — OFFICE VISIT (OUTPATIENT)
Dept: FAMILY MEDICINE CLINIC | Facility: CLINIC | Age: 68
End: 2023-10-09
Payer: COMMERCIAL

## 2023-10-09 VITALS
OXYGEN SATURATION: 98 % | HEART RATE: 70 BPM | DIASTOLIC BLOOD PRESSURE: 90 MMHG | BODY MASS INDEX: 26.49 KG/M2 | WEIGHT: 159 LBS | HEIGHT: 65 IN | RESPIRATION RATE: 20 BRPM | SYSTOLIC BLOOD PRESSURE: 170 MMHG

## 2023-10-09 DIAGNOSIS — R30.0 DYSURIA: ICD-10-CM

## 2023-10-09 DIAGNOSIS — M24.812 INTERNAL DERANGEMENT OF SHOULDER, LEFT: Primary | ICD-10-CM

## 2023-10-09 DIAGNOSIS — E78.00 ELEVATED CHOLESTEROL: ICD-10-CM

## 2023-10-09 LAB
APPEARANCE: CLEAR
BILIRUBIN: NEGATIVE
GLUCOSE (URINE DIPSTICK): NEGATIVE MG/DL
KETONES (URINE DIPSTICK): NEGATIVE MG/DL
LEUKOCYTES: NEGATIVE
MULTISTIX LOT#: ABNORMAL NUMERIC
NITRITE, URINE: NEGATIVE
PH, URINE: 6 (ref 4.5–8)
SPECIFIC GRAVITY: >=1.03 (ref 1–1.03)
URINE-COLOR: YELLOW
UROBILINOGEN,SEMI-QN: 0.2 MG/DL (ref 0–1.9)

## 2023-10-09 PROCEDURE — 87086 URINE CULTURE/COLONY COUNT: CPT | Performed by: INTERNAL MEDICINE

## 2023-10-09 RX ORDER — PHENAZOPYRIDINE HYDROCHLORIDE 200 MG/1
200 TABLET, FILM COATED ORAL 3 TIMES DAILY PRN
Qty: 30 TABLET | Refills: 0 | Status: SHIPPED | OUTPATIENT
Start: 2023-10-09

## 2023-10-09 RX ORDER — CEFDINIR 300 MG/1
300 CAPSULE ORAL 2 TIMES DAILY
Qty: 20 CAPSULE | Refills: 0 | Status: SHIPPED | OUTPATIENT
Start: 2023-10-09

## 2023-10-09 RX ORDER — SIMVASTATIN 20 MG
20 TABLET ORAL EVERY EVENING
Qty: 90 TABLET | Refills: 0 | Status: SHIPPED | OUTPATIENT
Start: 2023-10-09

## 2023-10-18 ENCOUNTER — PATIENT MESSAGE (OUTPATIENT)
Dept: FAMILY MEDICINE CLINIC | Facility: CLINIC | Age: 68
End: 2023-10-18

## 2023-10-18 DIAGNOSIS — R30.9 PAINFUL URINATION: Primary | ICD-10-CM

## 2023-11-02 ENCOUNTER — TELEPHONE (OUTPATIENT)
Dept: FAMILY MEDICINE CLINIC | Facility: CLINIC | Age: 68
End: 2023-11-02

## 2023-11-02 ENCOUNTER — TELEMEDICINE (OUTPATIENT)
Dept: FAMILY MEDICINE CLINIC | Facility: CLINIC | Age: 68
End: 2023-11-02
Payer: COMMERCIAL

## 2023-11-02 DIAGNOSIS — U07.1 COVID-19 VIRUS INFECTION: Primary | ICD-10-CM

## 2023-11-02 PROCEDURE — 99213 OFFICE O/P EST LOW 20 MIN: CPT | Performed by: FAMILY MEDICINE

## 2023-11-02 NOTE — TELEPHONE ENCOUNTER
Pharmacy has questions about dosage and directions,  does pt have renal impairment or not?   Please call

## 2023-11-02 NOTE — PROGRESS NOTES
This visit is conducted using Telemedicine with live, interactive video and audio. Telehealth outside of [address] Verbal Consent   I conducted a telehealth visit with Sheri Rowland today, 11/02/23, which was completed using two-way, real-time interactive audio and video communication. This has been done in good christy to provide continuity of care in the best interest of the provider-patient relationship, due to the COVID -19 public health crisis/national emergency where restrictions of face-to-face office visits are ongoing. Every conscious effort was taken to allow for sufficient and adequate time to complete the visit. The patient was made aware of the limitations of the telehealth visit, including treatment limitations as no physical exam could be performed. The patient was advised to call 911 or to go to the ER in case there was an emergency. The patient was also advised of the potential privacy & security concerns related to the telehealth platform. The patient was made aware of where to find [facility] notice of privacy practices, telehealth consent form and other related consent forms and documents. which are located on the [facility] website. The patient verbally agreed to telehealth consent form, related consents and the risks discussed. Lastly, the patient confirmed that they were in [place]. Included in this visit, time may have been spent reviewing labs, medications, radiology tests and decision making. Appropriate medical decision-making and tests are ordered as detailed in the plan of care above. Coding/billing information is submitted for this visit based on complexity of care and/or time spent for the visit. Pt here with covid .     Symptoms started 10/31/ tested + 11/2/23  OTC meds -yes 2  +  cough and congestion  +  sinus tendernss  No  ear pain  +  throat pain  +  fever and chills  ?  sick contacts  No diarrhea     ROS otherwise negative  Past medical, surgical and social history reviewed    Exam   alert, appears stated age and cooperative, Normocephalic, without obvious abnormality, atraumatic, lips, mucosa, and tongue normal; teeth and gums normal, Speaking in full sentences comfortably, Normal work of breathing, Skin color, texture, turgor normal. No rashes or lesions and age appropriate, normal, logical connections, person, place and time/date and no suicidal ideation    A/P  Pt here with     1. COVID-19 virus infection  Discussed ER parameters and med SE  Hold statin   Off the seroquel   - nirmatrelvir-ritonavir 300-100 MG Oral Tablet Therapy Pack; Take one nirmatrelvir tablet (150mg) with one ritonavir tablet (100mg) together twice daily for 5 days. (Nirmatrelvir dose is renally adjusted)  Dispense: 30 tablet;  Refill: 0    RTC  in one week if not better or sooner if needed  Questions answered and pt  expressed understanding

## 2023-11-02 NOTE — TELEPHONE ENCOUNTER
Symptoms started last night, fever, aching, cough, congestion.    Patient agreed to VV discuss plan of care with LE.

## 2023-11-02 NOTE — TELEPHONE ENCOUNTER
Pharmacy questioning renal impairment. It says Nirmatrelvir dose is renally adjusted on rx in directions. Okay to continue with this dose?

## 2023-11-05 ENCOUNTER — MOBILE ENCOUNTER (OUTPATIENT)
Dept: FAMILY MEDICINE CLINIC | Facility: CLINIC | Age: 68
End: 2023-11-05

## 2023-11-05 RX ORDER — AZITHROMYCIN 250 MG/1
TABLET, FILM COATED ORAL
Qty: 6 TABLET | Refills: 0 | Status: SHIPPED | OUTPATIENT
Start: 2023-11-05

## 2023-12-06 DIAGNOSIS — I10 ESSENTIAL HYPERTENSION: ICD-10-CM

## 2023-12-06 DIAGNOSIS — R39.15 URINARY URGENCY: ICD-10-CM

## 2023-12-11 RX ORDER — MIRABEGRON 50 MG/1
TABLET, FILM COATED, EXTENDED RELEASE ORAL
Qty: 90 TABLET | Refills: 0 | Status: SHIPPED | OUTPATIENT
Start: 2023-12-11

## 2023-12-18 ENCOUNTER — HOSPITAL ENCOUNTER (OUTPATIENT)
Dept: MRI IMAGING | Age: 68
Discharge: HOME | End: 2023-12-18
Attending: INTERNAL MEDICINE
Payer: COMMERCIAL

## 2023-12-18 ENCOUNTER — HOSPITAL ENCOUNTER (OUTPATIENT)
Dept: ULTRASOUND IMAGING | Age: 68
Discharge: HOME | End: 2023-12-18
Attending: FAMILY MEDICINE
Payer: COMMERCIAL

## 2023-12-18 ENCOUNTER — LAB ENCOUNTER (OUTPATIENT)
Dept: LAB | Age: 68
End: 2023-12-18
Attending: FAMILY MEDICINE
Payer: COMMERCIAL

## 2023-12-18 DIAGNOSIS — R73.9 HYPERGLYCEMIA: ICD-10-CM

## 2023-12-18 DIAGNOSIS — M24.812 INTERNAL DERANGEMENT OF SHOULDER, LEFT: ICD-10-CM

## 2023-12-18 DIAGNOSIS — E78.00 ELEVATED CHOLESTEROL: ICD-10-CM

## 2023-12-18 DIAGNOSIS — E04.1 THYROID NODULE: ICD-10-CM

## 2023-12-18 LAB
ALBUMIN SERPL-MCNC: 4.3 G/DL (ref 3.4–5)
ALBUMIN/GLOB SERPL: 1.4 {RATIO} (ref 1–2)
ALP LIVER SERPL-CCNC: 98 U/L
ALT SERPL-CCNC: 25 U/L
ANION GAP SERPL CALC-SCNC: 7 MMOL/L (ref 0–18)
AST SERPL-CCNC: 22 U/L (ref 15–37)
BILIRUB SERPL-MCNC: 0.5 MG/DL (ref 0.1–2)
BUN BLD-MCNC: 17 MG/DL (ref 9–23)
CALCIUM BLD-MCNC: 9.3 MG/DL (ref 8.5–10.1)
CHLORIDE SERPL-SCNC: 110 MMOL/L (ref 98–112)
CHOLEST SERPL-MCNC: 198 MG/DL (ref ?–200)
CO2 SERPL-SCNC: 24 MMOL/L (ref 21–32)
CREAT BLD-MCNC: 1.24 MG/DL
EGFRCR SERPLBLD CKD-EPI 2021: 48 ML/MIN/1.73M2 (ref 60–?)
EST. AVERAGE GLUCOSE BLD GHB EST-MCNC: 120 MG/DL (ref 68–126)
FASTING PATIENT LIPID ANSWER: YES
FASTING STATUS PATIENT QL REPORTED: YES
GLOBULIN PLAS-MCNC: 3 G/DL (ref 2.8–4.4)
GLUCOSE BLD-MCNC: 100 MG/DL (ref 70–99)
HBA1C MFR BLD: 5.8 % (ref ?–5.7)
HDLC SERPL-MCNC: 78 MG/DL (ref 40–59)
LDLC SERPL CALC-MCNC: 105 MG/DL (ref ?–100)
NONHDLC SERPL-MCNC: 120 MG/DL (ref ?–130)
OSMOLALITY SERPL CALC.SUM OF ELEC: 294 MOSM/KG (ref 275–295)
POTASSIUM SERPL-SCNC: 4 MMOL/L (ref 3.5–5.1)
PROT SERPL-MCNC: 7.3 G/DL (ref 6.4–8.2)
SODIUM SERPL-SCNC: 141 MMOL/L (ref 136–145)
TRIGL SERPL-MCNC: 87 MG/DL (ref 30–149)
VLDLC SERPL CALC-MCNC: 15 MG/DL (ref 0–30)

## 2023-12-18 PROCEDURE — 80061 LIPID PANEL: CPT

## 2023-12-18 PROCEDURE — 80053 COMPREHEN METABOLIC PANEL: CPT

## 2023-12-18 PROCEDURE — 83036 HEMOGLOBIN GLYCOSYLATED A1C: CPT

## 2023-12-18 PROCEDURE — 73221 MRI JOINT UPR EXTREM W/O DYE: CPT | Performed by: INTERNAL MEDICINE

## 2023-12-18 PROCEDURE — 76536 US EXAM OF HEAD AND NECK: CPT | Performed by: FAMILY MEDICINE

## 2023-12-18 PROCEDURE — 36415 COLL VENOUS BLD VENIPUNCTURE: CPT

## 2023-12-23 DIAGNOSIS — E03.9 HYPOTHYROIDISM, UNSPECIFIED TYPE: ICD-10-CM

## 2023-12-26 RX ORDER — LEVOTHYROXINE SODIUM 88 UG/1
88 TABLET ORAL DAILY
Qty: 90 TABLET | Refills: 0 | Status: SHIPPED | OUTPATIENT
Start: 2023-12-26

## 2023-12-27 DIAGNOSIS — M25.512 ACUTE PAIN OF LEFT SHOULDER: Primary | ICD-10-CM

## 2023-12-31 NOTE — PROGRESS NOTES
HPI:   Lashonda Bullock is a 68 year old female who presents for f/u on MMP     Discussed labs and imaging     Moved dad into the area  Has been very busy getting him settled   Not sleeping well     C/o hand pain that she presumes is OA  Grandfather had RA     Still dealing with chronic constipation   Pt has tried many meds     L  Component      Latest Ref Rng 8/4/2023 12/18/2023   Glucose      70 - 99 mg/dL 100 (H)  100 (H)    Sodium      136 - 145 mmol/L 139  141    Potassium      3.5 - 5.1 mmol/L 4.4  4.0    Chloride      98 - 112 mmol/L 109  110    Carbon Dioxide, Total      21.0 - 32.0 mmol/L 27.0  24.0    ANION GAP      0 - 18 mmol/L 3  7    BUN      9 - 23 mg/dL 17  17    CREATININE      0.55 - 1.02 mg/dL 1.26 (H)  1.24 (H)    CALCIUM      8.5 - 10.1 mg/dL 9.2  9.3    CALCULATED OSMOLALITY      275 - 295 mOsm/kg 290  294    EGFR      >=60 mL/min/1.73m2 47 (L)  48 (L)    AST (SGOT)      15 - 37 U/L 22  22    ALT (SGPT)      13 - 56 U/L 23  25    ALKALINE PHOSPHATASE      55 - 142 U/L 97  98    Total Bilirubin      0.1 - 2.0 mg/dL 0.5  0.5    PROTEIN, TOTAL      6.4 - 8.2 g/dL 7.3  7.3    Albumin      3.4 - 5.0 g/dL 4.0  4.3    Globulin      2.8 - 4.4 g/dL 3.3  3.0    A/G Ratio      1.0 - 2.0  1.2  1.4    Patient Fasting for CMP? Yes  Yes    Cholesterol, Total      <200 mg/dL 216 (H)  198    HDL Cholesterol      40 - 59 mg/dL 66 (H)  78 (H)    Triglycerides      30 - 149 mg/dL 134  87    LDL Cholesterol Calc      <100 mg/dL 127 (H)  105 (H)    VLDL      0 - 30 mg/dL 24  15    NON-HDL CHOLESTEROL      <130 mg/dL 150 (H)  120    Patient Fasting for Lipid? Yes  Yes    HEMOGLOBIN A1c      <5.7 % 6.2 (H)  5.8 (H)    ESTIMATED AVERAGE GLUCOSE      68 - 126 mg/dL 131 (H)  120    VITAMIN D, 25-OH, TOTAL      30.0 - 100.0 ng/mL 46.3        Legend:  (H) High  (L) Low    Current Outpatient Medications   Medication Sig Dispense Refill    PANTOPRAZOLE 40 MG Oral Tab EC TAKE 1 TABLET BY MOUTH EVERY DAY 30 MINUTES BEFORE  BREAKFAST. 90 tablet 0    LEVOTHYROXINE 88 MCG Oral Tab TAKE 1 TABLET BY MOUTH EVERY DAY 90 tablet 0    MYRBETRIQ 50 MG Oral Tablet 24 Hr TAKE 1 TABLET BY MOUTH EVERY DAY 90 tablet 0    METOPROLOL TARTRATE 25 MG Oral Tab TAKE 2 TABLETS BY MOUTH EVERY  tablet 0    lactulose 10 GM/15ML Oral Solution TAKE 30ML BY MOUTH THREE TIMES DAILY 2700 mL 0    phenazopyridine 200 MG Oral Tab Take 1 tablet (200 mg total) by mouth 3 (three) times daily as needed for Pain. 30 tablet 0    phenazopyridine (PYRIDIUM) 100 MG Oral Tab Take 1 tablet (100 mg total) by mouth 3 (three) times daily as needed for Pain. 15 tablet 0    metFORMIN 500 MG Oral Tab Take 1 tablet (500 mg total) by mouth 2 (two) times daily with meals. 180 tablet 0    RECTIV 0.4 % Rectal Ointment Place 1.5 g rectally Q12H.      hydroxychloroquine (PLAQUENIL) 200 MG Oral Tab Take 1 tablet (200 mg total) by mouth 2 (two) times daily. 180 tablet 3    Omeprazole 40 MG Oral Capsule Delayed Release Take 1 capsule (40 mg total) by mouth daily. 90 capsule 0    acidophilus-pectin Oral Cap Take 1 capsule by mouth daily.      Cyclobenzaprine HCl 5 MG Oral Tab Take 1 tablet (5 mg total) by mouth 3 (three) times daily as needed for Muscle spasms. 30 tablet 0    ondansetron 4 MG Oral Tablet Dispersible Take 1 tablet (4 mg total) by mouth every 8 (eight) hours as needed for Nausea. 10 tablet 0    EPINEPHrine (EPIPEN) 0.3 MG/0.3ML Injection Solution Auto-injector Inject into skin as needed for severe allergic reaction 1 each 0    BIOTIN OR Take by mouth.      aspirin 81 MG Oral Tab Take 1 tablet (81 mg total) by mouth daily.      Cholecalciferol (VITAMIN D-3 OR) Take 1,000 Units by mouth daily.        VITAMIN E Take 400 mg by mouth daily.        Cyanocobalamin (VITAMIN B-12 CR OR) Take  by mouth.      Calcium Carbonate (CALTRATE 600 OR) Take  by mouth.      azithromycin 250 MG Oral Tab Take 500 mg on day one then 250 mg daily for 4 days. (Patient not taking: Reported on  1/3/2024) 6 tablet 0    cefdinir 300 MG Oral Cap Take 1 capsule (300 mg total) by mouth 2 (two) times daily. (Patient not taking: Reported on 1/3/2024) 20 capsule 0      Past Medical History:   Diagnosis Date    Abdominal distention     Abdominal pain     Acute, but ill-defined, cerebrovascular disease     Anxiety     Arthritis     Basal cell carcinoma of skin     Bleeding nose     Bloating     Blood in urine     Calculus of kidney     Constipation     Depressive disorder, not elsewhere classified     Diverticulitis     Easy bruising     Endometriosis     Endometriosis, site unspecified     Fatigue     Frequent urination     Frequent UTI     Headache disorder     Heartburn     Hemorrhoids     Indigestion     Insomnia, unspecified     Irregular bowel habits     Leaking of urine     Leg swelling     Neuralgia august 2015    Night sweats     Osteoporosis     Pain with bowel movements     Skin blushing/flushing     Sleep disturbance     Stool incontinence     Stress     Thyroid disease     Wears glasses     Weight gain       Past Surgical History:   Procedure Laterality Date    APPENDECTOMY      APPENDECTOMY      BREAST SURGERY PROCEDURE UNLISTED      lumpectomy    CHOLECYSTECTOMY      COLONOSCOPY      EGD      HYSTERECTOMY  1991    total hystero.    TAISHA LOCALIZATION WIRE 1 SITE LEFT (CPT=19281)  1999    ADH    TAISHA LOCALIZATION WIRE 1 SITE RIGHT (CPT=19281) Right 1990    benign.    TAISHA LOCALIZATION WIRE 2 SITE RIGHT (CPT=19281/59053) Right 1993    benign.    NEEDLE BIOPSY RIGHT  2012    US guided biopsy-benign    OOPHORECTOMY Bilateral 1989    total hystero.    OTHER  1/1/00'    bronchoscopy w excision of tumor    OTHER      thoracotomy    OTHER SURGICAL HISTORY      gallbladder    OTHER SURGICAL HISTORY      thyroidectomy    SIGMOIDOSCOPY,DIAGNOSTIC      THYROIDECTOMY      TONSILLECTOMY      adenoidectomy    TOTAL ABDOM HYSTERECTOMY        Family History   Problem Relation Age of Onset    Cancer Maternal Grandmother      Breast Cancer Maternal Grandmother 69    Cancer Paternal Grandfather     Cancer Paternal Grandmother     Hypertension Father     Colon Polyps Father     Other (acute MI) Father     Other (CHF) Maternal Grandfather     Ovarian Cancer Mother 69    Colon Polyps Mother     Other (fallopian tube cancer) Mother       Social History:   Social History     Socioeconomic History    Marital status:    Tobacco Use    Smoking status: Former     Packs/day: 0.25     Years: 20.00     Additional pack years: 0.00     Total pack years: 5.00     Types: Cigarettes     Quit date: 2017     Years since quittin.6    Smokeless tobacco: Never   Vaping Use    Vaping Use: Never used   Substance and Sexual Activity    Alcohol use: No     Alcohol/week: 0.0 standard drinks of alcohol     Comment: socially    Drug use: No   Other Topics Concern    Caffeine Concern Yes     Comment: 1-2 cups daily    Exercise Yes     Comment: active     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  Diet: watches minimally     REVIEW OF SYSTEMS:   GENERAL: feels well otherwise  SKIN: denies any unusual skin lesions  EYES:denies blurred vision or double vision  HEENT: denies nasal congestion, sinus pain or ST  LUNGS: denies shortness of breath with exertion  CARDIOVASCULAR: denies chest pain on exertion  GI: denies abdominal pain,denies heartburn  MUSCULOSKELETAL: denies back pain  NEURO: denies headaches  PSYCHE: denies depression or anxiety  HEMATOLOGIC: denies hx of anemia  ALL/ASTHMA: denies asthma    EXAM:   /64   Pulse 72   Resp 16   Ht 5' 5\" (1.651 m)   Wt 151 lb (68.5 kg)   SpO2 99%   BMI 25.13 kg/m²   Body mass index is 25.13 kg/m².   GENERAL: alert and oriented X 3, well developed, well nourished,in no apparent distress  EXTREMITIES: no cyanosis, clubbing or edema  NEURO: cranial nerves are intact,motor and sensory are grossly intact  HEART: normal   LUNGS: clear  LE: no edema     ASSESSMENT AND PLAN:   Lashonda RUIZ  Young is a 68 year old female who presents with     1. Calcifying cyst of thyroid    - ENT Referral - In Network    2. Family history of thyroid cancer    - ENT Referral - In Network    3. Kidney cysts  Monitor     4. Hypothyroidism, unspecified type    - levothyroxine 88 MCG Oral Tab; Take 1 tablet (88 mcg total) by mouth daily.  Dispense: 90 tablet; Refill: 0    5. Hyperglycemia    - metFORMIN 500 MG Oral Tab; Take 1 tablet (500 mg total) by mouth 2 (two) times daily with meals.  Dispense: 180 tablet; Refill: 0    6. Essential hypertension    - metoprolol tartrate 25 MG Oral Tab; Take 2 tablets (50 mg total) by mouth daily.  Dispense: 180 tablet; Refill: 0    7. Other insomnia  May retry amitriptyline     8. General medical exam    - Assay, Thyroid Stim Hormone; Future  - Free T3 (Triiodothryronine); Future  - Free T4, (Free Thyroxine); Future  - Thyroid peroxidase & thyroglobulin ab; Future  - CBC With Differential With Platelet; Future  - Lipid Panel; Future  - Vitamin B12; Future  - Comp Metabolic Panel (14); Future  - Magnesium; Future  - Hemoglobin A1C; Future  - Vitamin D [E]; Future    9. Encounter for screening mammogram for high-risk patient    - [facility] FRANCHESKA 2D+3D SCREENING BILAT (CPT=77067/63702); Future      Questions answered and patient indicates understanding of these issues and agrees to the plan.  Follow up in 3 mo or sooner if needed.

## 2024-01-03 ENCOUNTER — OFFICE VISIT (OUTPATIENT)
Dept: FAMILY MEDICINE CLINIC | Facility: CLINIC | Age: 69
End: 2024-01-03
Payer: COMMERCIAL

## 2024-01-03 VITALS
OXYGEN SATURATION: 99 % | SYSTOLIC BLOOD PRESSURE: 132 MMHG | BODY MASS INDEX: 25.16 KG/M2 | RESPIRATION RATE: 16 BRPM | HEART RATE: 72 BPM | HEIGHT: 65 IN | DIASTOLIC BLOOD PRESSURE: 64 MMHG | WEIGHT: 151 LBS

## 2024-01-03 DIAGNOSIS — G47.09 OTHER INSOMNIA: ICD-10-CM

## 2024-01-03 DIAGNOSIS — Z80.8 FAMILY HISTORY OF THYROID CANCER: Primary | ICD-10-CM

## 2024-01-03 DIAGNOSIS — E03.9 HYPOTHYROIDISM, UNSPECIFIED TYPE: ICD-10-CM

## 2024-01-03 DIAGNOSIS — N28.1 KIDNEY CYSTS: ICD-10-CM

## 2024-01-03 DIAGNOSIS — Z12.31 ENCOUNTER FOR SCREENING MAMMOGRAM FOR HIGH-RISK PATIENT: ICD-10-CM

## 2024-01-03 DIAGNOSIS — Z00.00 GENERAL MEDICAL EXAM: ICD-10-CM

## 2024-01-03 DIAGNOSIS — I10 ESSENTIAL HYPERTENSION: ICD-10-CM

## 2024-01-03 DIAGNOSIS — R73.9 HYPERGLYCEMIA: ICD-10-CM

## 2024-01-03 DIAGNOSIS — E04.1 CALCIFYING CYST OF THYROID: ICD-10-CM

## 2024-01-03 PROCEDURE — 3075F SYST BP GE 130 - 139MM HG: CPT | Performed by: FAMILY MEDICINE

## 2024-01-03 PROCEDURE — 99214 OFFICE O/P EST MOD 30 MIN: CPT | Performed by: FAMILY MEDICINE

## 2024-01-03 PROCEDURE — 3078F DIAST BP <80 MM HG: CPT | Performed by: FAMILY MEDICINE

## 2024-01-03 PROCEDURE — 3008F BODY MASS INDEX DOCD: CPT | Performed by: FAMILY MEDICINE

## 2024-01-03 RX ORDER — LEVOTHYROXINE SODIUM 88 UG/1
88 TABLET ORAL DAILY
Qty: 90 TABLET | Refills: 0 | Status: SHIPPED | OUTPATIENT
Start: 2024-01-03

## 2024-01-09 ENCOUNTER — OFFICE VISIT (OUTPATIENT)
Facility: LOCATION | Age: 69
End: 2024-01-09
Payer: COMMERCIAL

## 2024-01-09 DIAGNOSIS — E04.2 MULTIPLE THYROID NODULES: Primary | ICD-10-CM

## 2024-01-09 PROCEDURE — 99204 OFFICE O/P NEW MOD 45 MIN: CPT | Performed by: OTOLARYNGOLOGY

## 2024-01-09 NOTE — PROGRESS NOTES
Lashonda Bullock is a 68 year old female.   Chief Complaint   Patient presents with    Thyroid Problem     HPI:   68-year-old white female she has a family history of thyroid cancer her mother had it.  She has had multiple nodules throughout her entire life.  Had 2 previous thyroidectomy procedures 1 at 14 and 1 at 18.  Apparently they did not remove her entire thyroid and she is continue to have nodules throughout her life.  2 to 3 years ago they attempted a biopsy and were unsuccessful.  Patient has no symptoms related to these nodules.  She does take thyroid replacement.  She has never had head neck radiation.  Her new thyroid ultrasound with comparison 6 months ago shows one of the nodules has a macrocalcification.  Current Outpatient Medications   Medication Sig Dispense Refill    levothyroxine 88 MCG Oral Tab Take 1 tablet (88 mcg total) by mouth daily. 90 tablet 0    metFORMIN 500 MG Oral Tab Take 1 tablet (500 mg total) by mouth 2 (two) times daily with meals. 180 tablet 0    metoprolol tartrate 25 MG Oral Tab Take 2 tablets (50 mg total) by mouth daily. 180 tablet 0    PANTOPRAZOLE 40 MG Oral Tab EC TAKE 1 TABLET BY MOUTH EVERY DAY 30 MINUTES BEFORE BREAKFAST. 90 tablet 0    MYRBETRIQ 50 MG Oral Tablet 24 Hr TAKE 1 TABLET BY MOUTH EVERY DAY 90 tablet 0    azithromycin 250 MG Oral Tab Take 500 mg on day one then 250 mg daily for 4 days. (Patient not taking: Reported on 1/3/2024) 6 tablet 0    lactulose 10 GM/15ML Oral Solution TAKE 30ML BY MOUTH THREE TIMES DAILY 2700 mL 0    cefdinir 300 MG Oral Cap Take 1 capsule (300 mg total) by mouth 2 (two) times daily. (Patient not taking: Reported on 1/3/2024) 20 capsule 0    phenazopyridine 200 MG Oral Tab Take 1 tablet (200 mg total) by mouth 3 (three) times daily as needed for Pain. 30 tablet 0    phenazopyridine (PYRIDIUM) 100 MG Oral Tab Take 1 tablet (100 mg total) by mouth 3 (three) times daily as needed for Pain. 15 tablet 0    RECTIV 0.4 % Rectal Ointment  Place 1.5 g rectally Q12H.      hydroxychloroquine (PLAQUENIL) 200 MG Oral Tab Take 1 tablet (200 mg total) by mouth 2 (two) times daily. 180 tablet 3    Omeprazole 40 MG Oral Capsule Delayed Release Take 1 capsule (40 mg total) by mouth daily. 90 capsule 0    acidophilus-pectin Oral Cap Take 1 capsule by mouth daily.      Cyclobenzaprine HCl 5 MG Oral Tab Take 1 tablet (5 mg total) by mouth 3 (three) times daily as needed for Muscle spasms. 30 tablet 0    ondansetron 4 MG Oral Tablet Dispersible Take 1 tablet (4 mg total) by mouth every 8 (eight) hours as needed for Nausea. 10 tablet 0    EPINEPHrine (EPIPEN) 0.3 MG/0.3ML Injection Solution Auto-injector Inject into skin as needed for severe allergic reaction 1 each 0    BIOTIN OR Take by mouth.      aspirin 81 MG Oral Tab Take 1 tablet (81 mg total) by mouth daily.      Cholecalciferol (VITAMIN D-3 OR) Take 1,000 Units by mouth daily.        VITAMIN E Take 400 mg by mouth daily.        Cyanocobalamin (VITAMIN B-12 CR OR) Take  by mouth.      Calcium Carbonate (CALTRATE 600 OR) Take  by mouth.        Past Medical History:   Diagnosis Date    Abdominal distention     Abdominal pain     Acute, but ill-defined, cerebrovascular disease     Anxiety     Arthritis     Basal cell carcinoma of skin     Bleeding nose     Bloating     Blood in urine     Calculus of kidney     Constipation     Depressive disorder, not elsewhere classified     Diverticulitis     Easy bruising     Endometriosis     Endometriosis, site unspecified     Fatigue     Frequent urination     Frequent UTI     Headache disorder     Heartburn     Hemorrhoids     Indigestion     Insomnia, unspecified     Irregular bowel habits     Leaking of urine     Leg swelling     Neuralgia august 2015    Night sweats     Osteoporosis     Pain with bowel movements     Skin blushing/flushing     Sleep disturbance     Stool incontinence     Stress     Thyroid disease     Wears glasses     Weight gain       Social  History:  Social History     Socioeconomic History    Marital status:    Tobacco Use    Smoking status: Former     Packs/day: 0.25     Years: 20.00     Additional pack years: 0.00     Total pack years: 5.00     Types: Cigarettes     Quit date: 2017     Years since quittin.6    Smokeless tobacco: Never   Vaping Use    Vaping Use: Never used   Substance and Sexual Activity    Alcohol use: No     Alcohol/week: 0.0 standard drinks of alcohol     Comment: socially    Drug use: No   Other Topics Concern    Caffeine Concern Yes     Comment: 1-2 cups daily    Exercise Yes     Comment: active      Past Surgical History:   Procedure Laterality Date    APPENDECTOMY      APPENDECTOMY      BREAST SURGERY PROCEDURE UNLISTED      lumpectomy    CHOLECYSTECTOMY      COLONOSCOPY      EGD      HYSTERECTOMY      total hystero.    TAISHA LOCALIZATION WIRE 1 SITE LEFT (CPT=19281)      ADH    TAISHA LOCALIZATION WIRE 1 SITE RIGHT (CPT=19281) Right     benign.    TAISHA LOCALIZATION WIRE 2 SITE RIGHT (CPT=19281/39237) Right     benign.    NEEDLE BIOPSY RIGHT      US guided biopsy-benign    OOPHORECTOMY Bilateral     total hystero.    OTHER  00'    bronchoscopy w excision of tumor    OTHER      thoracotomy    OTHER SURGICAL HISTORY      gallbladder    OTHER SURGICAL HISTORY      thyroidectomy    SIGMOIDOSCOPY,DIAGNOSTIC      THYROIDECTOMY      TONSILLECTOMY      adenoidectomy    TOTAL ABDOM HYSTERECTOMY           REVIEW OF SYSTEMS:   GENERAL HEALTH: feels well otherwise  GENERAL : denies fever, chills, sweats, weight loss, weight gain  SKIN: denies any unusual skin lesions or rashes  RESPIRATORY: denies shortness of breath with exertion  NEURO: denies headaches    EXAM:   There were no vitals taken for this visit.    System Pertinent findings Details   Constitutional  Overall appearance - Normal.   Psychiatric  Orientation - Oriented to time, place, person & situation. Appropriate mood and affect.    Head/Face  Facial features -- Normal. Skull - Normal.   Eyes  Pupils equal ,round ,react to light and accomidate   Ears  External Ear Right: Normal, Left: Normal. Canal - Right: Normal, Left: Normal. TM - Right: Normal left: Normal   Nose  External Nose, Normal, Septum -  Nasal Vault, there,Turbinates - Right: Normal left: Normal   Mouth/Throat  Lips/teeth/gums - Normal. Tonsils -0+. Oropharynx - Normal.   Neck Exam  Inspection - Normal. Palpation - Normal. Parotid gland - Normal. Thyroid gland -thyroidectomy incision small nodules not palpable   Neurological  Cranial nerves - Cranial nerves II through XII grossly intact.   Nasopharynx   Normal        Lymph Detail  Submental. Submandibular. Anterior cervical. Posterior cervical. Supraclavicular.     Reviewed the thyroid ultrasound from 12/18/2023.  Shows unchanged subcentimeters thyroid nodules right and left side.  Largest nodule is 9 mm on the right decreased in size.  ASSESSMENT AND PLAN:   1. Multiple thyroid nodules  1 year follow-up thyroid ultrasound      The patient indicates understanding of these issues and agrees to the plan.      Logan Hanks MD  1/9/2024  12:08 PM

## 2024-02-11 DIAGNOSIS — E78.00 ELEVATED CHOLESTEROL: ICD-10-CM

## 2024-02-12 RX ORDER — SIMVASTATIN 20 MG
20 TABLET ORAL EVERY EVENING
Qty: 90 TABLET | Refills: 0 | OUTPATIENT
Start: 2024-02-12

## 2024-02-18 DIAGNOSIS — E78.00 ELEVATED CHOLESTEROL: ICD-10-CM

## 2024-02-19 RX ORDER — SIMVASTATIN 20 MG
20 TABLET ORAL EVERY EVENING
Qty: 90 TABLET | Refills: 0 | OUTPATIENT
Start: 2024-02-19

## 2024-03-06 ENCOUNTER — HOSPITAL ENCOUNTER (OUTPATIENT)
Dept: GENERAL RADIOLOGY | Age: 69
Discharge: HOME | End: 2024-03-06
Attending: FAMILY MEDICINE
Payer: COMMERCIAL

## 2024-03-06 ENCOUNTER — EKG ENCOUNTER (OUTPATIENT)
Dept: LAB | Age: 69
End: 2024-03-06
Attending: FAMILY MEDICINE
Payer: COMMERCIAL

## 2024-03-06 ENCOUNTER — LAB ENCOUNTER (OUTPATIENT)
Dept: LAB | Age: 69
End: 2024-03-06
Attending: FAMILY MEDICINE
Payer: COMMERCIAL

## 2024-03-06 DIAGNOSIS — Z01.818 PRE-OP TESTING: ICD-10-CM

## 2024-03-06 DIAGNOSIS — Z00.00 GENERAL MEDICAL EXAM: ICD-10-CM

## 2024-03-06 DIAGNOSIS — Z01.818 PRE-OP TESTING: Primary | ICD-10-CM

## 2024-03-06 LAB
ALBUMIN SERPL-MCNC: 4 G/DL (ref 3.4–5)
ALBUMIN/GLOB SERPL: 1.1 {RATIO} (ref 1–2)
ALP LIVER SERPL-CCNC: 122 U/L
ALT SERPL-CCNC: 25 U/L
ANION GAP SERPL CALC-SCNC: 6 MMOL/L (ref 0–18)
AST SERPL-CCNC: 37 U/L (ref 15–37)
ATRIAL RATE: 60 BPM
BASOPHILS # BLD AUTO: 0.07 X10(3) UL (ref 0–0.2)
BASOPHILS NFR BLD AUTO: 0.9 %
BILIRUB SERPL-MCNC: 0.3 MG/DL (ref 0.1–2)
BUN BLD-MCNC: 22 MG/DL (ref 9–23)
CALCIUM BLD-MCNC: 9.5 MG/DL (ref 8.5–10.1)
CHLORIDE SERPL-SCNC: 106 MMOL/L (ref 98–112)
CHOLEST SERPL-MCNC: 277 MG/DL (ref ?–200)
CO2 SERPL-SCNC: 26 MMOL/L (ref 21–32)
CREAT BLD-MCNC: 1.25 MG/DL
EGFRCR SERPLBLD CKD-EPI 2021: 47 ML/MIN/1.73M2 (ref 60–?)
EOSINOPHIL # BLD AUTO: 0.23 X10(3) UL (ref 0–0.7)
EOSINOPHIL NFR BLD AUTO: 3.1 %
ERYTHROCYTE [DISTWIDTH] IN BLOOD BY AUTOMATED COUNT: 13.5 %
EST. AVERAGE GLUCOSE BLD GHB EST-MCNC: 126 MG/DL (ref 68–126)
FASTING PATIENT LIPID ANSWER: YES
FASTING STATUS PATIENT QL REPORTED: YES
GLOBULIN PLAS-MCNC: 3.6 G/DL (ref 2.8–4.4)
GLUCOSE BLD-MCNC: 76 MG/DL (ref 70–99)
HBA1C MFR BLD: 6 % (ref ?–5.7)
HCT VFR BLD AUTO: 43.8 %
HDLC SERPL-MCNC: 83 MG/DL (ref 40–59)
HGB BLD-MCNC: 13.4 G/DL
IMM GRANULOCYTES # BLD AUTO: 0.02 X10(3) UL (ref 0–1)
IMM GRANULOCYTES NFR BLD: 0.3 %
LDLC SERPL CALC-MCNC: 180 MG/DL (ref ?–100)
LYMPHOCYTES # BLD AUTO: 2.17 X10(3) UL (ref 1–4)
LYMPHOCYTES NFR BLD AUTO: 29.2 %
MAGNESIUM SERPL-MCNC: 2.2 MG/DL (ref 1.6–2.6)
MCH RBC QN AUTO: 27.7 PG (ref 26–34)
MCHC RBC AUTO-ENTMCNC: 30.6 G/DL (ref 31–37)
MCV RBC AUTO: 90.5 FL
MONOCYTES # BLD AUTO: 0.49 X10(3) UL (ref 0.1–1)
MONOCYTES NFR BLD AUTO: 6.6 %
NEUTROPHILS # BLD AUTO: 4.44 X10 (3) UL (ref 1.5–7.7)
NEUTROPHILS # BLD AUTO: 4.44 X10(3) UL (ref 1.5–7.7)
NEUTROPHILS NFR BLD AUTO: 59.9 %
NONHDLC SERPL-MCNC: 194 MG/DL (ref ?–130)
OSMOLALITY SERPL CALC.SUM OF ELEC: 288 MOSM/KG (ref 275–295)
P AXIS: 75 DEGREES
P-R INTERVAL: 164 MS
PLATELET # BLD AUTO: 320 10(3)UL (ref 150–450)
POTASSIUM SERPL-SCNC: 3.9 MMOL/L (ref 3.5–5.1)
PROT SERPL-MCNC: 7.6 G/DL (ref 6.4–8.2)
Q-T INTERVAL: 432 MS
QRS DURATION: 90 MS
QTC CALCULATION (BEZET): 432 MS
R AXIS: 69 DEGREES
RBC # BLD AUTO: 4.84 X10(6)UL
SODIUM SERPL-SCNC: 138 MMOL/L (ref 136–145)
T AXIS: 60 DEGREES
T3FREE SERPL-MCNC: 2.2 PG/ML (ref 2.4–4.2)
T4 FREE SERPL-MCNC: 1.4 NG/DL (ref 0.8–1.7)
THYROGLOB SERPL-MCNC: <15 U/ML (ref ?–60)
THYROPEROXIDASE AB SERPL-ACNC: 33 U/ML (ref ?–60)
TRIGL SERPL-MCNC: 83 MG/DL (ref 30–149)
TSI SER-ACNC: 0.36 MIU/ML (ref 0.36–3.74)
VENTRICULAR RATE: 60 BPM
VIT B12 SERPL-MCNC: 820 PG/ML (ref 193–986)
VIT D+METAB SERPL-MCNC: 51.1 NG/ML (ref 30–100)
VLDLC SERPL CALC-MCNC: 17 MG/DL (ref 0–30)
WBC # BLD AUTO: 7.4 X10(3) UL (ref 4–11)

## 2024-03-06 PROCEDURE — 93010 ELECTROCARDIOGRAM REPORT: CPT | Performed by: INTERNAL MEDICINE

## 2024-03-06 PROCEDURE — 83735 ASSAY OF MAGNESIUM: CPT | Performed by: FAMILY MEDICINE

## 2024-03-06 PROCEDURE — 83036 HEMOGLOBIN GLYCOSYLATED A1C: CPT | Performed by: FAMILY MEDICINE

## 2024-03-06 PROCEDURE — 71046 X-RAY EXAM CHEST 2 VIEWS: CPT | Performed by: FAMILY MEDICINE

## 2024-03-06 PROCEDURE — 93005 ELECTROCARDIOGRAM TRACING: CPT

## 2024-03-06 PROCEDURE — 86376 MICROSOMAL ANTIBODY EACH: CPT | Performed by: FAMILY MEDICINE

## 2024-03-06 PROCEDURE — 84439 ASSAY OF FREE THYROXINE: CPT | Performed by: FAMILY MEDICINE

## 2024-03-06 PROCEDURE — 84481 FREE ASSAY (FT-3): CPT | Performed by: FAMILY MEDICINE

## 2024-03-06 PROCEDURE — 80050 GENERAL HEALTH PANEL: CPT | Performed by: FAMILY MEDICINE

## 2024-03-06 PROCEDURE — 82607 VITAMIN B-12: CPT | Performed by: FAMILY MEDICINE

## 2024-03-06 PROCEDURE — 80061 LIPID PANEL: CPT | Performed by: FAMILY MEDICINE

## 2024-03-06 PROCEDURE — 86800 THYROGLOBULIN ANTIBODY: CPT | Performed by: FAMILY MEDICINE

## 2024-03-06 PROCEDURE — 82306 VITAMIN D 25 HYDROXY: CPT | Performed by: FAMILY MEDICINE

## 2024-03-07 DIAGNOSIS — E78.00 ELEVATED CHOLESTEROL: ICD-10-CM

## 2024-03-07 RX ORDER — SIMVASTATIN 20 MG
20 TABLET ORAL EVERY EVENING
Qty: 90 TABLET | Refills: 0 | Status: SHIPPED | OUTPATIENT
Start: 2024-03-07

## 2024-03-07 NOTE — TELEPHONE ENCOUNTER
Approve/deny? Last filled 10/9/23, last ov 1/3/24, last lipid done yesterday    Appt 3/13/24 scheduled

## 2024-03-11 DIAGNOSIS — E78.00 SERUM CHOLESTEROL ELEVATED: ICD-10-CM

## 2024-03-11 DIAGNOSIS — R79.89 DECREASED THYROID STIMULATING HORMONE (TSH) LEVEL: Primary | ICD-10-CM

## 2024-03-13 ENCOUNTER — OFFICE VISIT (OUTPATIENT)
Dept: FAMILY MEDICINE CLINIC | Facility: CLINIC | Age: 69
End: 2024-03-13
Payer: COMMERCIAL

## 2024-03-13 VITALS
HEIGHT: 65 IN | WEIGHT: 152 LBS | SYSTOLIC BLOOD PRESSURE: 128 MMHG | OXYGEN SATURATION: 98 % | BODY MASS INDEX: 25.33 KG/M2 | HEART RATE: 74 BPM | DIASTOLIC BLOOD PRESSURE: 88 MMHG

## 2024-03-13 DIAGNOSIS — Z80.8 FAMILY HISTORY OF THYROID CANCER: ICD-10-CM

## 2024-03-13 DIAGNOSIS — R73.9 HYPERGLYCEMIA: ICD-10-CM

## 2024-03-13 DIAGNOSIS — E03.9 HYPOTHYROIDISM, UNSPECIFIED TYPE: ICD-10-CM

## 2024-03-13 DIAGNOSIS — C43.59 MALIGNANT MELANOMA OF SKIN OF CHEST (HCC): ICD-10-CM

## 2024-03-13 DIAGNOSIS — K58.1 IRRITABLE BOWEL SYNDROME WITH CONSTIPATION: ICD-10-CM

## 2024-03-13 DIAGNOSIS — M35.9 UNDIFFERENTIATED CONNECTIVE TISSUE DISEASE (HCC): ICD-10-CM

## 2024-03-13 DIAGNOSIS — E04.1 CALCIFYING CYST OF THYROID: ICD-10-CM

## 2024-03-13 DIAGNOSIS — N32.81 OVERACTIVE BLADDER: ICD-10-CM

## 2024-03-13 DIAGNOSIS — Z01.818 PRE-OP EXAMINATION: Primary | ICD-10-CM

## 2024-03-13 DIAGNOSIS — R76.8 POSITIVE ANA (ANTINUCLEAR ANTIBODY): ICD-10-CM

## 2024-03-13 DIAGNOSIS — E78.00 PURE HYPERCHOLESTEROLEMIA: ICD-10-CM

## 2024-03-13 DIAGNOSIS — I10 ESSENTIAL HYPERTENSION: ICD-10-CM

## 2024-03-13 PROCEDURE — 3008F BODY MASS INDEX DOCD: CPT | Performed by: PHYSICIAN ASSISTANT

## 2024-03-13 PROCEDURE — 99214 OFFICE O/P EST MOD 30 MIN: CPT | Performed by: PHYSICIAN ASSISTANT

## 2024-03-13 PROCEDURE — 3079F DIAST BP 80-89 MM HG: CPT | Performed by: PHYSICIAN ASSISTANT

## 2024-03-13 PROCEDURE — 3074F SYST BP LT 130 MM HG: CPT | Performed by: PHYSICIAN ASSISTANT

## 2024-03-13 NOTE — PROGRESS NOTES
Subjective:   Patient ID: Lashonda Bullock is a 69 year old female.    HPI  Patient presents for pre-operative exam  Date: 3/20/2024  Surgery: left upper lateral chest 1.0 cm wide local excision for malignant melanoma  Surgeon: Dr. Olesya Villaseñor  Location: [facility]    No h/o complications from anesthesia  Taking baby ASA, advised to stop 7 days prior by her surgeon  Also advised to stop taking her vitamin E    H/o HTN  Well controlled on metoprolol 25 mg 2 pills daily  No chest pain, palpitations, sob, edema, vision change, headaches    H/o hyperlipidemia  Currently on statin 20 mg daily  She ran out of statin and was off for 2 weeks  Her LDL from December until now increased about 75 points  No significant change in diet/acitvity otherwise  CT cardiac calcium scan recommended    H/o hypothyroidism  Recent TSH 0.356 normal free T4  Advised by PCP to continue same dose of levothyroxine 88 mcg/day and recheck TSH in 1 month    H/o multiple thyroid nodules  Has family h/o thyroid cancer  Recent ultrasound done 12/2023 showing overall stability    H/o hyperglycemia  A1c up to 6.0 from 5.8 before  Currently on metformin 500 mg BID and tolerating well    H/o elevated JOSE MARTIN and undifferentiated connective tissue disease  Has h/o chronic GI symptoms and joint pain  Currently on Plaquenil 200 mg BID  Using tramadol prn  Seeing rheumatology on a regular basis    H/o overactive bladder and pelvic floor weakness  On myrbetriq daily  Sees urology  Symptoms seem controlled currently  No symptoms of UTI    H/o IBS  Currently using lactulose prn  Difficulty with maintaining a well balanced diet and avoiding IBS flare    Denies tobacco use in the last 7 years  Rare etoh use  Diet is well balanced, some restrictions due to IBS   Regular exercise - as tolerated    History/Other:   Review of Systems   Constitutional:  Negative for chills, fatigue and fever.   HENT:  Negative for congestion, ear pain, rhinorrhea and sore throat.    Eyes:   Negative for visual disturbance.   Respiratory:  Negative for cough, shortness of breath and wheezing.    Cardiovascular:  Negative for chest pain, palpitations and leg swelling.   Gastrointestinal:  Negative for abdominal pain, diarrhea, nausea and vomiting.   Genitourinary:  Negative for dysuria, frequency and hematuria.   Musculoskeletal:  Negative for arthralgias, gait problem and myalgias.   Skin:  Negative for rash.   Neurological:  Negative for weakness, light-headedness and headaches.   Hematological:  Negative for adenopathy.   Psychiatric/Behavioral:  Negative for dysphoric mood. The patient is not nervous/anxious.      Current Outpatient Medications   Medication Sig Dispense Refill    simvastatin 20 MG Oral Tab Take 1 tablet (20 mg total) by mouth every evening. 90 tablet 0    lactulose 10 GM/15ML Oral Solution TAKE 30 ML BY MOUTH THREE TIMES DAILY 2700 mL 0    levothyroxine 88 MCG Oral Tab Take 1 tablet (88 mcg total) by mouth daily. 90 tablet 0    metFORMIN 500 MG Oral Tab Take 1 tablet (500 mg total) by mouth 2 (two) times daily with meals. 180 tablet 0    metoprolol tartrate 25 MG Oral Tab Take 2 tablets (50 mg total) by mouth daily. 180 tablet 0    PANTOPRAZOLE 40 MG Oral Tab EC TAKE 1 TABLET BY MOUTH EVERY DAY 30 MINUTES BEFORE BREAKFAST. 90 tablet 0    MYRBETRIQ 50 MG Oral Tablet 24 Hr TAKE 1 TABLET BY MOUTH EVERY DAY 90 tablet 0    phenazopyridine 200 MG Oral Tab Take 1 tablet (200 mg total) by mouth 3 (three) times daily as needed for Pain. 30 tablet 0    phenazopyridine (PYRIDIUM) 100 MG Oral Tab Take 1 tablet (100 mg total) by mouth 3 (three) times daily as needed for Pain. 15 tablet 0    RECTIV 0.4 % Rectal Ointment Place 1.5 g rectally Q12H.      hydroxychloroquine (PLAQUENIL) 200 MG Oral Tab Take 1 tablet (200 mg total) by mouth 2 (two) times daily. 180 tablet 3    Omeprazole 40 MG Oral Capsule Delayed Release Take 1 capsule (40 mg total) by mouth daily. 90 capsule 0    acidophilus-pectin  Oral Cap Take 1 capsule by mouth daily.      Cyclobenzaprine HCl 5 MG Oral Tab Take 1 tablet (5 mg total) by mouth 3 (three) times daily as needed for Muscle spasms. 30 tablet 0    ondansetron 4 MG Oral Tablet Dispersible Take 1 tablet (4 mg total) by mouth every 8 (eight) hours as needed for Nausea. 10 tablet 0    EPINEPHrine (EPIPEN) 0.3 MG/0.3ML Injection Solution Auto-injector Inject into skin as needed for severe allergic reaction 1 each 0    BIOTIN OR Take by mouth.      aspirin 81 MG Oral Tab Take 1 tablet (81 mg total) by mouth daily.      Cholecalciferol (VITAMIN D-3 OR) Take 1,000 Units by mouth daily.        VITAMIN E Take 400 mg by mouth daily.        Cyanocobalamin (VITAMIN B-12 CR OR) Take  by mouth.      Calcium Carbonate (CALTRATE 600 OR) Take  by mouth.       Allergies:  Allergies   Allergen Reactions    Adhesive Tape HIVES    Paxlovid [Nirmatrelvir-Ritonavir] RASH and SWELLING    Radiology Contrast Iodinated Dyes Tightness in Throat    Bee      Sting      Iodine (Topical)      SOLN      Macrobid [Nitrofurantoin] OTHER (SEE COMMENTS)     SEVERE VOMITING AND DIARRHEA    Trees, [place]      [place] trees    Wellbutrin [Bupropion Hcl]        Objective:   Physical Exam  Vitals and nursing note reviewed.   Constitutional:       General: She is not in acute distress.     Appearance: Normal appearance. She is well-developed.   HENT:      Head: Normocephalic and atraumatic.      Right Ear: Tympanic membrane, ear canal and external ear normal.      Left Ear: Tympanic membrane, ear canal and external ear normal.      Nose: Nose normal.      Mouth/Throat:      Mouth: Mucous membranes are moist.   Eyes:      Extraocular Movements: Extraocular movements intact.      Conjunctiva/sclera: Conjunctivae normal.      Pupils: Pupils are equal, round, and reactive to light.   Neck:      Thyroid: No thyromegaly.   Cardiovascular:      Rate and Rhythm: Normal rate and regular rhythm.      Pulses: Normal pulses.       Heart sounds: Normal heart sounds.   Pulmonary:      Effort: Pulmonary effort is normal.      Breath sounds: Normal breath sounds. No wheezing or rales.   Abdominal:      General: Bowel sounds are normal. There is no distension.      Palpations: Abdomen is soft. There is no mass.      Tenderness: There is no abdominal tenderness.   Musculoskeletal:         General: No tenderness. Normal range of motion.      Cervical back: Normal range of motion and neck supple.   Lymphadenopathy:      Cervical: No cervical adenopathy.   Skin:     General: Skin is warm and dry.      Findings: No rash.   Neurological:      General: No focal deficit present.      Mental Status: She is alert and oriented to person, place, and time.   Psychiatric:         Mood and Affect: Mood normal.         Behavior: Behavior normal.         Assessment & Plan:   1. Pre-op examination  2. Malignant melanoma of skin of chest (HCC)  Physical exam is unremarkable. Pre-op labs, EKG, and chest xray have been reviewed. Patient is medically stable for surgery. Advised to stop all ibuprofen, aspirin, over-the-counter cough and cold medications, vitamins/supplements for 7 days prior to procedure. Contact the office if symptoms of a cough, urinary infection, or skin infection develop prior to procedure. Follow-up with PCP 1-2 weeks after procedure.    3. Calcifying cyst of thyroid  Recheck ultrasound this summer. Refer to ENT if needed.   - US THYROID (CPT=76536); Future    4. Family history of thyroid cancer  As above.   - US THYROID (CPT=76536); Future    5. Hypothyroidism, unspecified type  Recent TSH slightly low. Per PCP remain on same dose of LT4 and recheck levels in 4 weeks.     6. Pure hypercholesterolemia  Recent lipids show elevation in LDL by 75 points. Was off statin for a few weeks. Just resumed taking and will recheck in 3-6 months. Continue working on healthy low fat diet and regular exercise.     7. Irritable bowel syndrome with  constipation  Discussed mediterranean and low fodmap diets. Continue with regular exercise and adequate sleep. Continue lactulose prn.     8. Positive JOSE MARTIN (antinuclear antibody)  9. Undifferentiated connective tissue disease (HCC)  Management per rheumatology. Seems to be doing well on Plaquenil.     10. Hyperglycemia  Recent increase in A1c up to 6.0. continue metformin at the current dosage and keep working on healthy diet and regular exercise.     11. Overactive bladder  Doing well on myrbetriq, sees urology.     12. Essential hypertension  Well controlled. Cpm. Follow up in 6 months.

## 2024-03-14 ENCOUNTER — TELEPHONE (OUTPATIENT)
Dept: FAMILY MEDICINE CLINIC | Facility: CLINIC | Age: 69
End: 2024-03-14

## 2024-03-28 DIAGNOSIS — R39.15 URINARY URGENCY: ICD-10-CM

## 2024-03-29 RX ORDER — MIRABEGRON 50 MG/1
TABLET, FILM COATED, EXTENDED RELEASE ORAL
Qty: 90 TABLET | Refills: 0 | OUTPATIENT
Start: 2024-03-29

## 2024-03-30 DIAGNOSIS — R39.15 URINARY URGENCY: ICD-10-CM

## 2024-04-02 RX ORDER — MIRABEGRON 50 MG/1
50 TABLET, FILM COATED, EXTENDED RELEASE ORAL DAILY
Qty: 90 TABLET | Refills: 0 | OUTPATIENT
Start: 2024-04-02

## 2024-04-07 NOTE — PROGRESS NOTES
HPI:   Lashonda Bullock is a 69 year old female who presents for a complete physical exam.       Wt Readings from Last 6 Encounters:   04/08/24 156 lb (70.8 kg)   03/13/24 152 lb (68.9 kg)   01/03/24 151 lb (68.5 kg)   10/09/23 159 lb (72.1 kg)   09/22/23 159 lb 6.4 oz (72.3 kg)   08/09/23 162 lb (73.5 kg)     Body mass index is 25.96 kg/m².     Cholesterol, Total (mg/dL)   Date Value   03/06/2024 277 (H)   12/18/2023 198   08/04/2023 216 (H)     CHOLESTEROL, TOTAL (mg/dL)   Date Value   12/10/2020 190   10/25/2019 212 (H)     CHOLESTEROL (mg/dL)   Date Value   10/01/2013 184   02/23/2012 195     HDL Cholesterol (mg/dL)   Date Value   03/06/2024 83 (H)   12/18/2023 78 (H)   08/04/2023 66 (H)     HDL CHOLESTEROL (mg/dL)   Date Value   12/10/2020 62   10/25/2019 66     HDL CHOL (mg/dL)   Date Value   10/01/2013 69   02/23/2012 60     LDL Cholesterol (mg/dL)   Date Value   03/06/2024 180 (H)   12/18/2023 105 (H)   08/04/2023 127 (H)     LDL-CHOLESTEROL (mg/dL (calc))   Date Value   12/10/2020 112 (H)   10/25/2019 126 (H)     LDL CHOLESTROL (mg/dL)   Date Value   10/01/2013 104   02/23/2012 121 (H)     AST (U/L)   Date Value   03/06/2024 37   12/18/2023 22   08/04/2023 22   06/14/2021 19   12/10/2020 20   10/25/2019 21     ALT (U/L)   Date Value   03/06/2024 25   12/18/2023 25   08/04/2023 23   06/14/2021 15   12/10/2020 15   10/25/2019 16       2013 normal pap- HPV negative   all previous paps normal  No vaginal discharge  + bladder dysfunction- stress incontinence / better on meds   No vaginal bleeding  Some vaginal dryness or hot flashes, +h/o complete hysterectomy - no h/o cancer   + performing self breast exams  Mammogram- due   bone density-2022 2019 colonoscopy  + calcium and vit D supplementation  No family history of breast colon or ovarian cancer    Pt has a history of thoracotomy and needs her routine chest x-ray.  Pt quit smoking     Pt not checking BP.  Patient denies chest pain, shortness of breath,  dizziness, and lightheadedness. No exertional symptoms.    Not sleeping great   Caring for dad who has dementia         Current Outpatient Medications   Medication Sig Dispense Refill    simvastatin 20 MG Oral Tab Take 1 tablet (20 mg total) by mouth every evening. 90 tablet 0    lactulose 10 GM/15ML Oral Solution TAKE 30 ML BY MOUTH THREE TIMES DAILY 2700 mL 0    levothyroxine 88 MCG Oral Tab Take 1 tablet (88 mcg total) by mouth daily. 90 tablet 0    metFORMIN 500 MG Oral Tab Take 1 tablet (500 mg total) by mouth 2 (two) times daily with meals. 180 tablet 0    metoprolol tartrate 25 MG Oral Tab Take 2 tablets (50 mg total) by mouth daily. 180 tablet 0    PANTOPRAZOLE 40 MG Oral Tab EC TAKE 1 TABLET BY MOUTH EVERY DAY 30 MINUTES BEFORE BREAKFAST. 90 tablet 0    MYRBETRIQ 50 MG Oral Tablet 24 Hr TAKE 1 TABLET BY MOUTH EVERY DAY 90 tablet 0    RECTIV 0.4 % Rectal Ointment Place 1.5 g rectally Q12H.      hydroxychloroquine (PLAQUENIL) 200 MG Oral Tab Take 1 tablet (200 mg total) by mouth 2 (two) times daily. 180 tablet 3    Omeprazole 40 MG Oral Capsule Delayed Release Take 1 capsule (40 mg total) by mouth daily. 90 capsule 0    acidophilus-pectin Oral Cap Take 1 capsule by mouth daily.      Cyclobenzaprine HCl 5 MG Oral Tab Take 1 tablet (5 mg total) by mouth 3 (three) times daily as needed for Muscle spasms. 30 tablet 0    ondansetron 4 MG Oral Tablet Dispersible Take 1 tablet (4 mg total) by mouth every 8 (eight) hours as needed for Nausea. 10 tablet 0    EPINEPHrine (EPIPEN) 0.3 MG/0.3ML Injection Solution Auto-injector Inject into skin as needed for severe allergic reaction 1 each 0    BIOTIN OR Take by mouth.      aspirin 81 MG Oral Tab Take 1 tablet (81 mg total) by mouth daily.      Cholecalciferol (VITAMIN D-3 OR) Take 1,000 Units by mouth daily.        VITAMIN E Take 400 mg by mouth daily.        Cyanocobalamin (VITAMIN B-12 CR OR) Take  by mouth.      Calcium Carbonate (CALTRATE 600 OR) Take  by mouth.         Past Medical History:   Diagnosis Date    Abdominal distention     Abdominal pain     Acute, but ill-defined, cerebrovascular disease     Anxiety     Arthritis     Basal cell carcinoma of skin     Bleeding nose     Bloating     Blood in urine     Calculus of kidney     Constipation     Depressive disorder, not elsewhere classified     Diverticulitis     Easy bruising     Endometriosis     Endometriosis, site unspecified     Fatigue     Frequent urination     Frequent UTI     Headache disorder     Heartburn     Hemorrhoids     Indigestion     Insomnia, unspecified     Irregular bowel habits     Leaking of urine     Leg swelling     Neuralgia august 2015    Night sweats     Osteoporosis     Pain with bowel movements     Skin blushing/flushing     Sleep disturbance     Stool incontinence     Stress     Thyroid disease     Wears glasses     Weight gain       Past Surgical History:   Procedure Laterality Date    APPENDECTOMY      APPENDECTOMY      BREAST SURGERY PROCEDURE UNLISTED      lumpectomy    CHOLECYSTECTOMY      COLONOSCOPY      EGD      HYSTERECTOMY  1991    total hystero.    TAISHA LOCALIZATION WIRE 1 SITE LEFT (CPT=19281)  1999    ADH    TAISHA LOCALIZATION WIRE 1 SITE RIGHT (CPT=19281) Right 1990    benign.    TAISHA LOCALIZATION WIRE 2 SITE RIGHT (CPT=19281/30853) Right 1993    benign.    NEEDLE BIOPSY RIGHT  2012    US guided biopsy-benign    OOPHORECTOMY Bilateral 1989    total hystero.    OTHER  1/1/00'    bronchoscopy w excision of tumor    OTHER      thoracotomy    OTHER SURGICAL HISTORY      gallbladder    OTHER SURGICAL HISTORY      thyroidectomy    SIGMOIDOSCOPY,DIAGNOSTIC      THYROIDECTOMY      TONSILLECTOMY      adenoidectomy    TOTAL ABDOM HYSTERECTOMY        Family History   Problem Relation Age of Onset    Cancer Maternal Grandmother     Breast Cancer Maternal Grandmother 69    Cancer Paternal Grandfather     Cancer Paternal Grandmother     Hypertension Father     Colon Polyps Father     Other (acute  MI) Father     Other (CHF) Maternal Grandfather     Ovarian Cancer Mother 69    Colon Polyps Mother     Other (fallopian tube cancer) Mother       Social History:   Social History     Socioeconomic History    Marital status:    Tobacco Use    Smoking status: Former     Packs/day: 0.25     Years: 20.00     Additional pack years: 0.00     Total pack years: 5.00     Types: Cigarettes     Quit date: 2017     Years since quittin.8    Smokeless tobacco: Never   Vaping Use    Vaping Use: Never used   Substance and Sexual Activity    Alcohol use: No     Alcohol/week: 0.0 standard drinks of alcohol     Comment: socially    Drug use: No   Other Topics Concern    Caffeine Concern Yes     Comment: 1-2 cups daily    Exercise Yes     Comment: active     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  Diet: watches minimally     REVIEW OF SYSTEMS:   GENERAL: feels well otherwise  SKIN: denies any unusual skin lesions  EYES:denies blurred vision or double vision  HEENT: denies nasal congestion, sinus pain or ST  LUNGS: denies shortness of breath with exertion  CARDIOVASCULAR: denies chest pain on exertion  GI: denies abdominal pain,denies heartburn  MUSCULOSKELETAL: denies back pain  NEURO: denies headaches  PSYCHE: denies depression or anxiety  HEMATOLOGIC: denies hx of anemia  ALL/ASTHMA: denies asthma    EXAM:   /76   Pulse 86   Resp 16   Ht 5' 5\" (1.651 m)   Wt 156 lb (70.8 kg)   PF 97 L/min   BMI 25.96 kg/m²   Body mass index is 25.96 kg/m².   GENERAL: alert and oriented X 3, well developed, well nourished,in no apparent distress  CARDIO: RRR without murmur  LUNGS: clear to auscultation  NECK: supple,no adenopathy, + thyromegaly  HEENT: atraumatic, normocephalic,ears and throat are clear  EYES:PERRLA, EOMI, normal,conjunctiva are clear  SKIN: red skin on nose   GI: good BS's,no masses, HSM or tenderness  CHEST: no chest tenderness  BREAST: no axillary LAD, no masses no nipple discharge  bilaterally  MUSCULOSKELETAL: back is not tender,FROM of the back  EXTREMITIES: no cyanosis, clubbing or edema  NEURO: cranial nerves are intact,motor and sensory are grossly intac    ASSESSMENT AND PLAN:   Lashonda Bullock is a 69 year old female who presents for a well woman exam.     1. Urinary urgency    - Mirabegron ER (MYRBETRIQ) 50 MG Oral Tablet 24 Hr; Take 1 tablet (50 mg total) by mouth daily.  Dispense: 90 tablet; Refill: 3    2. Annual physical exam    - Lipid Panel; Future  - Comp Metabolic Panel (14); Future  - Hemoglobin A1C; Future  - Free T4, (Free Thyroxine); Future  - Assay, Thyroid Stim Hormone; Future    3. Hypothyroidism, unspecified type    - levothyroxine 88 MCG Oral Tab; Take 1 tablet (88 mcg total) by mouth daily.  Dispense: 90 tablet; Refill: 1    4. Hyperglycemia    - metFORMIN 500 MG Oral Tab; Take 1 tablet (500 mg total) by mouth 2 (two) times daily with meals.  Dispense: 180 tablet; Refill: 1    5. Essential hypertension    - metoprolol tartrate 25 MG Oral Tab; Take 2 tablets (50 mg total) by mouth daily.  Dispense: 180 tablet; Refill: 1    6. Elevated cholesterol    - simvastatin 20 MG Oral Tab; Take 1 tablet (20 mg total) by mouth every evening.  Dispense: 90 tablet; Refill: 1    7. Exposure to hazardous chemical    - CT LUNG LD SCREENING(CPT=71271); Future    8. History of melanoma  See derm     9. Encounter for screening mammogram for high-risk patient    - [facility] FRANCHESKA 2D+3D SCREENING BILAT (CPT=77067/55468); Future    10. Screening for osteoporosis    - XR DEXA BONE DENSITOMETRY (CPT=77080); Future          Discussed diet, exercise,calcium, vitamin D, fish oil and self breast exams.   Questions answered and patient indicates understanding of these issues and agrees to the plan.  Follow up in 1 mo or sooner if needed.

## 2024-04-08 ENCOUNTER — OFFICE VISIT (OUTPATIENT)
Dept: FAMILY MEDICINE CLINIC | Facility: CLINIC | Age: 69
End: 2024-04-08
Payer: COMMERCIAL

## 2024-04-08 VITALS
HEIGHT: 65 IN | DIASTOLIC BLOOD PRESSURE: 76 MMHG | RESPIRATION RATE: 16 BRPM | BODY MASS INDEX: 25.99 KG/M2 | HEART RATE: 86 BPM | SYSTOLIC BLOOD PRESSURE: 136 MMHG | WEIGHT: 156 LBS

## 2024-04-08 DIAGNOSIS — E03.9 HYPOTHYROIDISM, UNSPECIFIED TYPE: ICD-10-CM

## 2024-04-08 DIAGNOSIS — E78.00 ELEVATED CHOLESTEROL: ICD-10-CM

## 2024-04-08 DIAGNOSIS — I10 ESSENTIAL HYPERTENSION: ICD-10-CM

## 2024-04-08 DIAGNOSIS — Z12.31 ENCOUNTER FOR SCREENING MAMMOGRAM FOR HIGH-RISK PATIENT: ICD-10-CM

## 2024-04-08 DIAGNOSIS — Z85.820 HISTORY OF MELANOMA: ICD-10-CM

## 2024-04-08 DIAGNOSIS — Z13.820 SCREENING FOR OSTEOPOROSIS: ICD-10-CM

## 2024-04-08 DIAGNOSIS — Z77.098 EXPOSURE TO HAZARDOUS CHEMICAL: ICD-10-CM

## 2024-04-08 DIAGNOSIS — R39.15 URINARY URGENCY: ICD-10-CM

## 2024-04-08 DIAGNOSIS — R73.9 HYPERGLYCEMIA: ICD-10-CM

## 2024-04-08 DIAGNOSIS — Z00.00 ANNUAL PHYSICAL EXAM: Primary | ICD-10-CM

## 2024-04-08 PROCEDURE — 3008F BODY MASS INDEX DOCD: CPT | Performed by: FAMILY MEDICINE

## 2024-04-08 PROCEDURE — 3078F DIAST BP <80 MM HG: CPT | Performed by: FAMILY MEDICINE

## 2024-04-08 PROCEDURE — 3075F SYST BP GE 130 - 139MM HG: CPT | Performed by: FAMILY MEDICINE

## 2024-04-08 PROCEDURE — 99397 PER PM REEVAL EST PAT 65+ YR: CPT | Performed by: FAMILY MEDICINE

## 2024-04-08 RX ORDER — LEVOTHYROXINE SODIUM 88 UG/1
88 TABLET ORAL DAILY
Qty: 90 TABLET | Refills: 1 | Status: SHIPPED | OUTPATIENT
Start: 2024-04-08

## 2024-04-08 RX ORDER — OMEPRAZOLE 40 MG/1
40 CAPSULE, DELAYED RELEASE ORAL DAILY
Qty: 90 CAPSULE | Refills: 1 | Status: SHIPPED | OUTPATIENT
Start: 2024-04-08 | End: 2024-07-07

## 2024-04-08 RX ORDER — SIMVASTATIN 20 MG
20 TABLET ORAL EVERY EVENING
Qty: 90 TABLET | Refills: 1 | Status: SHIPPED | OUTPATIENT
Start: 2024-04-08

## 2024-04-08 RX ORDER — MIRABEGRON 50 MG/1
50 TABLET, FILM COATED, EXTENDED RELEASE ORAL DAILY
Qty: 90 TABLET | Refills: 3 | Status: SHIPPED | OUTPATIENT
Start: 2024-04-08

## 2024-06-26 ENCOUNTER — HOSPITAL ENCOUNTER (OUTPATIENT)
Dept: CT IMAGING | Age: 69
Discharge: HOME | End: 2024-06-26
Attending: FAMILY MEDICINE

## 2024-06-26 ENCOUNTER — HOSPITAL ENCOUNTER (OUTPATIENT)
Dept: BONE DENSITY | Age: 69
Discharge: HOME | End: 2024-06-26
Attending: FAMILY MEDICINE

## 2024-06-26 ENCOUNTER — HOSPITAL ENCOUNTER (OUTPATIENT)
Dept: MAMMOGRAPHY | Age: 69
Discharge: HOME | End: 2024-06-26
Attending: FAMILY MEDICINE

## 2024-06-26 ENCOUNTER — LAB ENCOUNTER (OUTPATIENT)
Dept: LAB | Age: 69
End: 2024-06-26
Attending: FAMILY MEDICINE

## 2024-06-26 ENCOUNTER — HOSPITAL ENCOUNTER (OUTPATIENT)
Dept: ULTRASOUND IMAGING | Age: 69
Discharge: HOME | End: 2024-06-26
Attending: PHYSICIAN ASSISTANT

## 2024-06-26 DIAGNOSIS — Z12.31 ENCOUNTER FOR SCREENING MAMMOGRAM FOR HIGH-RISK PATIENT: ICD-10-CM

## 2024-06-26 DIAGNOSIS — Z00.00 ANNUAL PHYSICAL EXAM: ICD-10-CM

## 2024-06-26 DIAGNOSIS — Z13.820 SCREENING FOR OSTEOPOROSIS: ICD-10-CM

## 2024-06-26 DIAGNOSIS — Z77.098 EXPOSURE TO HAZARDOUS CHEMICAL: ICD-10-CM

## 2024-06-26 DIAGNOSIS — R79.89 DECREASED THYROID STIMULATING HORMONE (TSH) LEVEL: ICD-10-CM

## 2024-06-26 DIAGNOSIS — Z80.8 FAMILY HISTORY OF THYROID CANCER: ICD-10-CM

## 2024-06-26 DIAGNOSIS — E04.1 CALCIFYING CYST OF THYROID: ICD-10-CM

## 2024-06-26 LAB
ALBUMIN SERPL-MCNC: 3.9 G/DL (ref 3.4–5)
ALBUMIN/GLOB SERPL: 1.2 {RATIO} (ref 1–2)
ALP LIVER SERPL-CCNC: 113 U/L
ALT SERPL-CCNC: 23 U/L
ANION GAP SERPL CALC-SCNC: 6 MMOL/L (ref 0–18)
AST SERPL-CCNC: 18 U/L (ref 15–37)
BILIRUB SERPL-MCNC: 0.4 MG/DL (ref 0.1–2)
BUN BLD-MCNC: 17 MG/DL (ref 9–23)
CALCIUM BLD-MCNC: 9.1 MG/DL (ref 8.5–10.1)
CHLORIDE SERPL-SCNC: 110 MMOL/L (ref 98–112)
CHOLEST SERPL-MCNC: 202 MG/DL (ref ?–200)
CO2 SERPL-SCNC: 27 MMOL/L (ref 21–32)
CREAT BLD-MCNC: 1.26 MG/DL
EGFRCR SERPLBLD CKD-EPI 2021: 47 ML/MIN/1.73M2 (ref 60–?)
EST. AVERAGE GLUCOSE BLD GHB EST-MCNC: 117 MG/DL (ref 68–126)
FASTING PATIENT LIPID ANSWER: YES
FASTING STATUS PATIENT QL REPORTED: YES
GLOBULIN PLAS-MCNC: 3.3 G/DL (ref 2.8–4.4)
GLUCOSE BLD-MCNC: 100 MG/DL (ref 70–99)
HBA1C MFR BLD: 5.7 % (ref ?–5.7)
HDLC SERPL-MCNC: 75 MG/DL (ref 40–59)
LDLC SERPL CALC-MCNC: 116 MG/DL (ref ?–100)
NONHDLC SERPL-MCNC: 127 MG/DL (ref ?–130)
OSMOLALITY SERPL CALC.SUM OF ELEC: 298 MOSM/KG (ref 275–295)
POTASSIUM SERPL-SCNC: 4.1 MMOL/L (ref 3.5–5.1)
PROT SERPL-MCNC: 7.2 G/DL (ref 6.4–8.2)
SODIUM SERPL-SCNC: 143 MMOL/L (ref 136–145)
T4 FREE SERPL-MCNC: 1.3 NG/DL (ref 0.8–1.7)
TRIGL SERPL-MCNC: 60 MG/DL (ref 30–149)
TSI SER-ACNC: 0.29 MIU/ML (ref 0.36–3.74)
VLDLC SERPL CALC-MCNC: 10 MG/DL (ref 0–30)

## 2024-06-26 PROCEDURE — 80061 LIPID PANEL: CPT

## 2024-06-26 PROCEDURE — 84443 ASSAY THYROID STIM HORMONE: CPT

## 2024-06-26 PROCEDURE — 77080 DXA BONE DENSITY AXIAL: CPT | Performed by: FAMILY MEDICINE

## 2024-06-26 PROCEDURE — 36415 COLL VENOUS BLD VENIPUNCTURE: CPT

## 2024-06-26 PROCEDURE — 77067 SCR MAMMO BI INCL CAD: CPT | Performed by: FAMILY MEDICINE

## 2024-06-26 PROCEDURE — 71271 CT THORAX LUNG CANCER SCR C-: CPT | Performed by: FAMILY MEDICINE

## 2024-06-26 PROCEDURE — 76536 US EXAM OF HEAD AND NECK: CPT | Performed by: PHYSICIAN ASSISTANT

## 2024-06-26 PROCEDURE — 77063 BREAST TOMOSYNTHESIS BI: CPT | Performed by: FAMILY MEDICINE

## 2024-06-26 PROCEDURE — 80053 COMPREHEN METABOLIC PANEL: CPT

## 2024-06-26 PROCEDURE — 83036 HEMOGLOBIN GLYCOSYLATED A1C: CPT

## 2024-06-26 PROCEDURE — 84439 ASSAY OF FREE THYROXINE: CPT

## 2024-07-25 ENCOUNTER — TELEPHONE (OUTPATIENT)
Dept: FAMILY MEDICINE CLINIC | Facility: CLINIC | Age: 69
End: 2024-07-25

## 2024-07-25 NOTE — TELEPHONE ENCOUNTER
Patient had a recent change in her thyroid medication due to recent labs.    She went from 88mcg to 75mcg.    Could vertigo be a side effect of this change in dosage.

## 2024-07-25 NOTE — TELEPHONE ENCOUNTER
Appt made with LR to f/u  States she gets dizzy when changing positions from sitting to standing, has been happening on and off x 1 1/2 week. Has b/p cuff at home, will check her b/p. No chest pain

## 2024-07-26 ENCOUNTER — OFFICE VISIT (OUTPATIENT)
Dept: FAMILY MEDICINE CLINIC | Facility: CLINIC | Age: 69
End: 2024-07-26
Payer: COMMERCIAL

## 2024-07-26 VITALS
WEIGHT: 154 LBS | HEIGHT: 65 IN | BODY MASS INDEX: 25.66 KG/M2 | RESPIRATION RATE: 16 BRPM | HEART RATE: 61 BPM | SYSTOLIC BLOOD PRESSURE: 122 MMHG | OXYGEN SATURATION: 97 % | DIASTOLIC BLOOD PRESSURE: 78 MMHG

## 2024-07-26 DIAGNOSIS — H65.93 MIDDLE EAR EFFUSION, BILATERAL: ICD-10-CM

## 2024-07-26 DIAGNOSIS — R42 VERTIGO: Primary | ICD-10-CM

## 2024-07-26 PROCEDURE — 3008F BODY MASS INDEX DOCD: CPT | Performed by: PHYSICIAN ASSISTANT

## 2024-07-26 PROCEDURE — 99214 OFFICE O/P EST MOD 30 MIN: CPT | Performed by: PHYSICIAN ASSISTANT

## 2024-07-26 PROCEDURE — 3074F SYST BP LT 130 MM HG: CPT | Performed by: PHYSICIAN ASSISTANT

## 2024-07-26 PROCEDURE — 3078F DIAST BP <80 MM HG: CPT | Performed by: PHYSICIAN ASSISTANT

## 2024-07-26 RX ORDER — SENNOSIDES A AND B 8.6 MG/1
1 TABLET, FILM COATED ORAL DAILY
COMMUNITY
Start: 2024-03-20

## 2024-07-26 NOTE — PROGRESS NOTES
Subjective:   Patient ID: Lashonda Bullock is a 69 year old female.    HPI  Patient presents c/o dizziness/vertigo symptoms  She reports after lab testing in June her Levothyroxine was changed from 88 mcg to 75 mcg (she has been on same dose for many years prior to this)  Since then has had symptoms, unsure if from the medication change since that is all that has changed  States that when she was standing she developed room spinning, happened also while sitting, stood up from trying on shoes and it happened again  She denies falls, vision loss, headache, tinnitus/hearing loss  No fever or chills  She is prone to allergies  Will be traveling soon and worried about symptoms not being resolved    History/Other:   Review of Systems   Constitutional:  Negative for chills, fatigue and fever.   HENT:  Negative for congestion, ear pain, rhinorrhea and sore throat.    Eyes:  Negative for visual disturbance.   Respiratory:  Negative for cough, shortness of breath and wheezing.    Cardiovascular:  Negative for chest pain, palpitations and leg swelling.   Gastrointestinal:  Negative for abdominal pain, diarrhea, nausea and vomiting.   Genitourinary:  Negative for dysuria, frequency and hematuria.   Musculoskeletal:  Negative for arthralgias, gait problem and myalgias.   Skin:  Negative for rash.   Neurological:  Positive for dizziness. Negative for weakness, light-headedness and headaches.   Hematological:  Negative for adenopathy.   Psychiatric/Behavioral:  Negative for dysphoric mood. The patient is not nervous/anxious.      Current Outpatient Medications   Medication Sig Dispense Refill    levothyroxine 75 MCG Oral Tab Take 1 tablet (75 mcg total) by mouth before breakfast. 90 tablet 0    PANTOPRAZOLE 40 MG Oral Tab EC TAKE 1 TABLET BY MOUTH EVERY DAY 30 MINUTES BEFORE BREAKFAST 90 tablet 0    lactulose 10 GM/15ML Oral Solution TAKE 30 ML BY MOUTH THREE TIMES DAILY 2700 mL 1    Mirabegron ER (MYRBETRIQ) 50 MG Oral Tablet 24 Hr  Take 1 tablet (50 mg total) by mouth daily. 90 tablet 3    metFORMIN 500 MG Oral Tab Take 1 tablet (500 mg total) by mouth 2 (two) times daily with meals. 180 tablet 1    metoprolol tartrate 25 MG Oral Tab Take 2 tablets (50 mg total) by mouth daily. 180 tablet 1    simvastatin 20 MG Oral Tab Take 1 tablet (20 mg total) by mouth every evening. 90 tablet 1    hydroxychloroquine (PLAQUENIL) 200 MG Oral Tab Take 1 tablet (200 mg total) by mouth 2 (two) times daily. 180 tablet 3    acidophilus-pectin Oral Cap Take 1 capsule by mouth daily.      ondansetron 4 MG Oral Tablet Dispersible Take 1 tablet (4 mg total) by mouth every 8 (eight) hours as needed for Nausea. 10 tablet 0    EPINEPHrine (EPIPEN) 0.3 MG/0.3ML Injection Solution Auto-injector Inject into skin as needed for severe allergic reaction 1 each 0    aspirin 81 MG Oral Tab Take 1 tablet (81 mg total) by mouth daily.      Cholecalciferol (VITAMIN D-3 OR) Take 1,000 Units by mouth daily.        VITAMIN E Take 400 mg by mouth daily.        Cyanocobalamin (VITAMIN B-12 CR OR) Take  by mouth.      Calcium Carbonate (CALTRATE 600 OR) Take  by mouth.      sennosides 8.6 MG Oral Tab Take 1 tablet (8.6 mg total) by mouth daily. (Patient not taking: Reported on 7/26/2024)      RECTIV 0.4 % Rectal Ointment Place 1.5 g rectally Q12H. (Patient not taking: Reported on 7/26/2024)      Cyclobenzaprine HCl 5 MG Oral Tab Take 1 tablet (5 mg total) by mouth 3 (three) times daily as needed for Muscle spasms. (Patient not taking: Reported on 7/26/2024) 30 tablet 0    BIOTIN OR Take by mouth. (Patient not taking: Reported on 7/26/2024)       Allergies:  Allergies   Allergen Reactions    Adhesive Tape HIVES    Paxlovid [Nirmatrelvir-Ritonavir] RASH and SWELLING    Radiology Contrast Iodinated Dyes Tightness in Throat    Bee      Sting      Iodine (Topical)      SOLN      Macrobid [Nitrofurantoin] OTHER (SEE COMMENTS)     SEVERE VOMITING AND DIARRHEA    [facility], [place]       [place] trees    Wellbutrin [Bupropion Hcl]        Objective:   Physical Exam  Vitals and nursing note reviewed.   Constitutional:       Appearance: She is well-developed.   HENT:      Head: Normocephalic and atraumatic.      Right Ear: External ear normal. A middle ear effusion is present.      Left Ear: External ear normal. A middle ear effusion is present.      Nose: Nose normal.      Mouth/Throat:      Lips: Pink.      Mouth: Mucous membranes are moist.      Pharynx: Oropharynx is clear.   Eyes:      Extraocular Movements:      Right eye: No nystagmus.      Left eye: No nystagmus.      Conjunctiva/sclera: Conjunctivae normal.      Pupils: Pupils are equal, round, and reactive to light.   Cardiovascular:      Rate and Rhythm: Normal rate and regular rhythm.      Heart sounds: Normal heart sounds.   Pulmonary:      Effort: Pulmonary effort is normal.      Breath sounds: Normal breath sounds. No wheezing or rales.   Musculoskeletal:      Cervical back: Normal range of motion and neck supple.   Lymphadenopathy:      Cervical: No cervical adenopathy.   Skin:     General: Skin is warm and dry.   Neurological:      Mental Status: She is alert.         Assessment & Plan:   1. Vertigo  2. Middle ear effusion, bilateral  Vertigo and dizziness most likely secondary to her significant middle ear effusions. Recommend 7 day course of sudafed. Also discussed using flonase/zyrtec alternatively. If not helping or symptoms worsen, follow up.

## 2024-08-14 ENCOUNTER — TELEPHONE (OUTPATIENT)
Dept: FAMILY MEDICINE CLINIC | Facility: CLINIC | Age: 69
End: 2024-08-14

## 2024-08-14 NOTE — TELEPHONE ENCOUNTER
If she has developed ear pain/pressure, fever, more URI symptoms then yes we can send zpack. If that is not the case and symptoms are the same I would recommend medrol pack instead. We can also refer for vestibular therapy if needed.

## 2024-08-14 NOTE — TELEPHONE ENCOUNTER
Please review previous message.  Pt had OV 7/26/24 for Vertigo.  Pt still c/o vertigo and wondering if ABX should be started ?  Please advise

## 2024-08-15 RX ORDER — METHYLPREDNISOLONE 4 MG/1
TABLET ORAL
Qty: 1 EACH | Refills: 0 | Status: SHIPPED | OUTPATIENT
Start: 2024-08-15

## 2024-08-15 NOTE — TELEPHONE ENCOUNTER
S/w pt  Discussed below.  Denies pain/pressure in ear or uri sx's  Had 3-4 moments feel dizzy still  Agrees to medrol for now. She is going out of town x 4 days. RX sent. I encouraged her to call back if she would like to pursue vestibular therapy and we can order. She voiced understanding

## 2024-08-28 ENCOUNTER — TELEPHONE (OUTPATIENT)
Dept: FAMILY MEDICINE CLINIC | Facility: CLINIC | Age: 69
End: 2024-08-28

## 2024-08-28 NOTE — TELEPHONE ENCOUNTER
I agree with in person evaluation. Okay to see me anytime. If she gets worse then I agree with IC/ER.

## 2024-08-28 NOTE — TELEPHONE ENCOUNTER
Patient has been working with Todd Rushing regarding her ear and vertigo.    Patient is still experiencing vertigo and wanted to see Todd.  Please advise if Todd can squeeze her in today.

## 2024-08-28 NOTE — TELEPHONE ENCOUNTER
Spoke with pt she completed steroid and Sudafed.  Pt still experiencing vertigo with position change and pt is c/o more headaches lately.Pt not sure if headaches are heat related.Pt states she still feels pressure mostly to right ear at times.I did recommend pt go to [facility] for evaluation.  Pt states she would rather follow up with Queenie ,pt states next week okay.Please advise

## 2024-08-30 DIAGNOSIS — M35.9 UNDIFFERENTIATED CONNECTIVE TISSUE DISEASE (HCC): ICD-10-CM

## 2024-09-03 NOTE — TELEPHONE ENCOUNTER
[facility] RN Triage-- pt has not been seen in over a year. MCM sent to call the office to schedule before future refills can be dispensed.

## 2024-09-09 ENCOUNTER — TELEPHONE (OUTPATIENT)
Dept: RHEUMATOLOGY | Facility: CLINIC | Age: 69
End: 2024-09-09

## 2024-09-09 NOTE — TELEPHONE ENCOUNTER
Future Appointments   Date Time Provider Department Center   9/12/2024  4:30 PM Queenie Hester PA-C [facility]   10/15/2024 10:00 AM Alicia Cheng DO [facility]     Last office visit: 5/10/2023    Last fill: 5/10/2023 180 tab, 3 refills    Sending pt a my chart message that she would need to be seen since its been over one year

## 2024-09-12 ENCOUNTER — OFFICE VISIT (OUTPATIENT)
Dept: FAMILY MEDICINE CLINIC | Facility: CLINIC | Age: 69
End: 2024-09-12
Payer: COMMERCIAL

## 2024-09-12 VITALS
OXYGEN SATURATION: 98 % | HEART RATE: 78 BPM | BODY MASS INDEX: 25.66 KG/M2 | RESPIRATION RATE: 16 BRPM | HEIGHT: 65 IN | DIASTOLIC BLOOD PRESSURE: 84 MMHG | SYSTOLIC BLOOD PRESSURE: 132 MMHG | WEIGHT: 154 LBS

## 2024-09-12 DIAGNOSIS — E03.9 HYPOTHYROIDISM, UNSPECIFIED TYPE: ICD-10-CM

## 2024-09-12 DIAGNOSIS — Z85.820 HISTORY OF MELANOMA: ICD-10-CM

## 2024-09-12 DIAGNOSIS — R42 VERTIGO: Primary | ICD-10-CM

## 2024-09-12 DIAGNOSIS — I10 ESSENTIAL HYPERTENSION: ICD-10-CM

## 2024-09-12 DIAGNOSIS — T82.858S: ICD-10-CM

## 2024-09-12 PROCEDURE — 3075F SYST BP GE 130 - 139MM HG: CPT | Performed by: FAMILY MEDICINE

## 2024-09-12 PROCEDURE — 3008F BODY MASS INDEX DOCD: CPT | Performed by: FAMILY MEDICINE

## 2024-09-12 PROCEDURE — 3079F DIAST BP 80-89 MM HG: CPT | Performed by: FAMILY MEDICINE

## 2024-09-12 PROCEDURE — 99214 OFFICE O/P EST MOD 30 MIN: CPT | Performed by: FAMILY MEDICINE

## 2024-09-12 RX ORDER — FLUTICASONE PROPIONATE 50 MCG
2 SPRAY, SUSPENSION (ML) NASAL NIGHTLY
Qty: 3 EACH | Refills: 0 | Status: SHIPPED | OUTPATIENT
Start: 2024-09-12

## 2024-09-12 NOTE — PROGRESS NOTES
HPI:   Lashonda Bullock is a 69 year old female who presents vertigo    Started August 21st - lasted 10 minutes   Pt has had it every 1-3 days - lasting a few seconds  No focal neuro symptoms   Aggravated by turning head and bending down   Pt has had more HA's   H/o melanoma     Was on sudafed / off now   Normal blood pressure     H/o vertigo years ago   Pt a cva and vertigo when she was 10 weeks PP / h/o preeclampsia   Age 41 subclavian bypass     Quit smoking   Patient denies chest pain, shortness of breath  No exertional symptoms.    Not taking any allergy meds    Dose of levo was reduced         Current Outpatient Medications   Medication Sig Dispense Refill    PANTOPRAZOLE 40 MG Oral Tab EC TAKE 1 TABLET BY MOUTH EVERY DAY 30 MINUTES BEFORE BREAKFAST 90 tablet 0    levothyroxine 75 MCG Oral Tab Take 1 tablet (75 mcg total) by mouth before breakfast. 90 tablet 0    lactulose 10 GM/15ML Oral Solution TAKE 30 ML BY MOUTH THREE TIMES DAILY 2700 mL 1    Mirabegron ER (MYRBETRIQ) 50 MG Oral Tablet 24 Hr Take 1 tablet (50 mg total) by mouth daily. 90 tablet 3    metFORMIN 500 MG Oral Tab Take 1 tablet (500 mg total) by mouth 2 (two) times daily with meals. 180 tablet 1    metoprolol tartrate 25 MG Oral Tab Take 2 tablets (50 mg total) by mouth daily. 180 tablet 1    simvastatin 20 MG Oral Tab Take 1 tablet (20 mg total) by mouth every evening. 90 tablet 1    hydroxychloroquine (PLAQUENIL) 200 MG Oral Tab Take 1 tablet (200 mg total) by mouth 2 (two) times daily. 180 tablet 3    acidophilus-pectin Oral Cap Take 1 capsule by mouth daily.      Cyclobenzaprine HCl 5 MG Oral Tab Take 1 tablet (5 mg total) by mouth 3 (three) times daily as needed for Muscle spasms. 30 tablet 0    ondansetron 4 MG Oral Tablet Dispersible Take 1 tablet (4 mg total) by mouth every 8 (eight) hours as needed for Nausea. 10 tablet 0    EPINEPHrine (EPIPEN) 0.3 MG/0.3ML Injection Solution Auto-injector Inject into skin as needed for severe allergic  reaction 1 each 0    aspirin 81 MG Oral Tab Take 1 tablet (81 mg total) by mouth daily.      Cholecalciferol (VITAMIN D-3 OR) Take 1,000 Units by mouth daily.        VITAMIN E Take 400 mg by mouth daily.        Cyanocobalamin (VITAMIN B-12 CR OR) Take  by mouth.      Calcium Carbonate (CALTRATE 600 OR) Take  by mouth.        Past Medical History:    Abdominal distention    Abdominal pain    Acute, but ill-defined, cerebrovascular disease    Anxiety    Arthritis    Basal cell carcinoma of skin    Bleeding nose    Bloating    Blood in urine    Calculus of kidney    Constipation    Depressive disorder, not elsewhere classified    Diverticulitis    Easy bruising    Endometriosis    Endometriosis, site unspecified    Fatigue    Frequent urination    Frequent UTI    Headache disorder    Heartburn    Hemorrhoids    Indigestion    Insomnia, unspecified    Irregular bowel habits    Leaking of urine    Leg swelling    Neuralgia    Night sweats    Osteoporosis    Pain with bowel movements    Skin blushing/flushing    Sleep disturbance    Stool incontinence    Stress    Thyroid disease    Wears glasses    Weight gain      Past Surgical History:   Procedure Laterality Date    Appendectomy      Appendectomy      Breast surgery procedure unlisted      lumpectomy    Cholecystectomy      Colonoscopy      Egd      Hysterectomy  1991    total hystero.    [facility] localization wire 1 site left (cpt=19281)  1999    ADH    [facility] localization wire 1 site right (cpt=19281) Right 1990    benign.    [facility] localization wire 2 site right (cpt=19281/08499) Right 1993    benign.    Needle biopsy right  2012    US guided biopsy-benign    Oophorectomy Bilateral 1989    total hystero.    Other  1/1/00'    bronchoscopy w excision of tumor    Other      thoracotomy    Other surgical history      gallbladder    Other surgical history      thyroidectomy    Sigmoidoscopy,diagnostic      Thyroidectomy      Tonsillectomy      adenoidectomy    Total abdom  hysterectomy        Family History   Problem Relation Age of Onset    Cancer Maternal Grandmother     Breast Cancer Maternal Grandmother 69    Cancer Paternal Grandfather     Cancer Paternal Grandmother     Hypertension Father     Colon Polyps Father     Other (acute MI) Father     Other (CHF) Maternal Grandfather     Ovarian Cancer Mother 69    Colon Polyps Mother     Other (fallopian tube cancer) Mother       Social History:   Social History     Socioeconomic History    Marital status:    Tobacco Use    Smoking status: Former     Current packs/day: 0.00     Average packs/day: 0.3 packs/day for 20.0 years (5.0 ttl pk-yrs)     Types: Cigarettes     Start date: 1997     Quit date: 2017     Years since quittin.3    Smokeless tobacco: Never   Vaping Use    Vaping status: Never Used   Substance and Sexual Activity    Alcohol use: No     Alcohol/week: 0.0 standard drinks of alcohol     Comment: socially    Drug use: No   Other Topics Concern    Caffeine Concern Yes     Comment: 1-2 cups daily    Exercise Yes     Comment: active     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  Diet: watches minimally     REVIEW OF SYSTEMS:   GENERAL: feels well otherwise  SKIN: denies any unusual skin lesions  EYES:denies blurred vision or double vision  HEENT: denies nasal congestion, sinus pain or ST  LUNGS: denies shortness of breath with exertion  CARDIOVASCULAR: denies chest pain on exertion  GI: denies abdominal pain,denies heartburn  MUSCULOSKELETAL: denies back pain  NEURO: denies headaches  PSYCHE: denies depression or anxiety  HEMATOLOGIC: denies hx of anemia  ALL/ASTHMA: denies asthma    EXAM:   /84   Pulse 78   Resp 16   Ht 5' 5\" (1.651 m)   Wt 154 lb (69.9 kg)   SpO2 98%   BMI 25.63 kg/m²   Body mass index is 25.63 kg/m².   GENERAL: alert and oriented X 3, well developed, well nourished,in no apparent distress  CARDIO: RRR without murmur  LUNGS: clear to auscultation  NECK:  supple,no adenopathy, no JVD, no carotid bruits   HEENT: atraumatic, normocephalic,ears and throat are clear  EYES:PERRLA, EOMI, normal,conjunctiva are clear   GI: good BS's,no masses, HSM or tenderness  CHEST: no chest tenderness  BREAST: no axillary LAD, no masses no nipple discharge bilaterally  MUSCULOSKELETAL: back is not tender,FROM of the back  EXTREMITIES: no cyanosis, clubbing or edema  NEURO: cranial nerves are intact,motor and sensory are grossly intac    ASSESSMENT AND PLAN:   Lashonda Bullock is a 69 year old female who presents with     1. Vertigo    - z [facility] MRI BRAIN/IAC  (CPT=70551); Future  - CARD ECHO 2D DOPPLER (CPT=93306); Future  - Assay, Thyroid Stim Hormone; Future  - Free T4, (Free Thyroxine); Future  - CBC With Differential With Platelet; Future  - Comp Metabolic Panel (14); Future  - Ferritin [E]; Future  - z [facility] US CAROTID DOPPLER BILAT - DIAG IMG (CPT=93880); Future  - z [facility] MRI BRAIN/IAC  (CPT=70551)  - z [facility] US CAROTID DOPPLER BILAT - DIAG IMG (CPT=93880)    2. Hypothyroidism, unspecified type    - z [facility] MRI BRAIN/IAC  (CPT=70551); Future  - CARD ECHO 2D DOPPLER (CPT=93306); Future  - Assay, Thyroid Stim Hormone; Future  - Free T4, (Free Thyroxine); Future  - CBC With Differential With Platelet; Future  - Comp Metabolic Panel (14); Future  - Ferritin [E]; Future  - z [facility] US CAROTID DOPPLER BILAT - DIAG IMG (CPT=93880); Future  - z [facility] MRI BRAIN/IAC  (CPT=70551)  - z [facility] US CAROTID DOPPLER BILAT - DIAG IMG (CPT=93880)    3. Essential hypertension    - z [facility] MRI BRAIN/IAC  (CPT=70551); Future  - CARD ECHO 2D DOPPLER (CPT=93306); Future  - Assay, Thyroid Stim Hormone; Future  - Free T4, (Free Thyroxine); Future  - CBC With Differential With Platelet; Future  - Comp Metabolic Panel (14); Future  - Ferritin [E]; Future  - z [facility] US CAROTID DOPPLER BILAT - DIAG IMG (CPT=93880); Future  - z [facility] MRI BRAIN/IAC  (CPT=70551)  - z [facility] US CAROTID DOPPLER  BILAT - DIAG IMG (CPT=93880)    4. History of melanoma    - z [facility] MRI BRAIN/IAC  (CPT=70551); Future  - CARD ECHO 2D DOPPLER (CPT=93306); Future  - Assay, Thyroid Stim Hormone; Future  - Free T4, (Free Thyroxine); Future  - CBC With Differential With Platelet; Future  - Comp Metabolic Panel (14); Future  - Ferritin [E]; Future  - z [facility] US CAROTID DOPPLER BILAT - DIAG IMG (CPT=93880); Future  - z [facility] MRI BRAIN/IAC  (CPT=70551)  - z [facility] US CAROTID DOPPLER BILAT - DIAG IMG (CPT=93880)    5. Stenosis of subclavian bypass, sequela  - z [facility] MRI BRAIN/IAC  (CPT=70551); Future  - CARD ECHO 2D DOPPLER (CPT=93306); Future  - Assay, Thyroid Stim Hormone; Future  - Free T4, (Free Thyroxine); Future  - CBC With Differential With Platelet; Future  - Comp Metabolic Panel (14); Future  - Ferritin [E]; Future  - z [facility] US CAROTID DOPPLER BILAT - DIAG IMG (CPT=93880); Future  - z [facility] MRI BRAIN/IAC  (CPT=70551)  - z [facility] US CAROTID DOPPLER BILAT - DIAG IMG (CPT=93880)      Questions answered and patient indicates understanding of these issues and agrees to the plan.  Follow up in 1 mo or sooner if needed.

## 2024-09-13 ENCOUNTER — TELEPHONE (OUTPATIENT)
Dept: RHEUMATOLOGY | Facility: CLINIC | Age: 69
End: 2024-09-13

## 2024-09-13 NOTE — TELEPHONE ENCOUNTER
[facility] phoned office, requesting refill of Hydroxychloroquine. Pt has not been since 5/10/23, per LOV note pt to be seen in 1 yr. Pt will require a FU visit for refills.     Future Appointments   Date Time Provider Department Center   10/15/2024 10:00 AM Alicia Cheng DO [facility] [address]

## 2024-09-16 ENCOUNTER — TELEPHONE (OUTPATIENT)
Dept: RHEUMATOLOGY | Facility: CLINIC | Age: 69
End: 2024-09-16

## 2024-09-16 DIAGNOSIS — M35.9 UNDIFFERENTIATED CONNECTIVE TISSUE DISEASE (HCC): ICD-10-CM

## 2024-09-16 RX ORDER — AZITHROMYCIN 250 MG/1
TABLET, FILM COATED ORAL
Qty: 6 TABLET | Refills: 0 | Status: SHIPPED | OUTPATIENT
Start: 2024-09-16 | End: 2024-09-20

## 2024-09-16 RX ORDER — HYDROXYCHLOROQUINE SULFATE 200 MG/1
200 TABLET, FILM COATED ORAL 2 TIMES DAILY
Qty: 180 TABLET | Refills: 0 | Status: SHIPPED | OUTPATIENT
Start: 2024-09-16

## 2024-09-16 RX ORDER — HYDROXYCHLOROQUINE SULFATE 200 MG/1
200 TABLET, FILM COATED ORAL 2 TIMES DAILY
Qty: 180 TABLET | Refills: 0 | OUTPATIENT
Start: 2024-09-16

## 2024-09-16 NOTE — TELEPHONE ENCOUNTER
Pt has appt with Rheum on 10/15/24, requesting refill until then. Approve/deny?Last filled 5/10/24, last ov 9/12/24

## 2024-09-16 NOTE — TELEPHONE ENCOUNTER
Pt states she is not able to tolerate Augmentin 875mg  States she took one dose on Saturday and felt nauseas and vomitted 45min later.  She does not want to take any more of this med.  She did take it with food and with a probiotic.  Still with vertigo and ear issue. Requesting a different antibiotic.

## 2024-09-16 NOTE — TELEPHONE ENCOUNTER
Pt was given amox-clav from Dr. Cheng for vertigo / fluid in the ear.   When pt took the medication she threw it up within 2 hours.   Pt has not taken any more.   Is there something else that can be prescribed.

## 2024-09-26 ENCOUNTER — LAB ENCOUNTER (OUTPATIENT)
Dept: LAB | Age: 69
End: 2024-09-26
Attending: FAMILY MEDICINE
Payer: COMMERCIAL

## 2024-09-26 DIAGNOSIS — T82.858S: ICD-10-CM

## 2024-09-26 DIAGNOSIS — Z85.820 HISTORY OF MELANOMA: ICD-10-CM

## 2024-09-26 DIAGNOSIS — I10 ESSENTIAL HYPERTENSION: ICD-10-CM

## 2024-09-26 DIAGNOSIS — R42 VERTIGO: ICD-10-CM

## 2024-09-26 DIAGNOSIS — E03.9 HYPOTHYROIDISM, UNSPECIFIED TYPE: ICD-10-CM

## 2024-09-26 LAB
ALBUMIN SERPL-MCNC: 4.9 G/DL (ref 3.2–4.8)
ALBUMIN/GLOB SERPL: 1.9 {RATIO} (ref 1–2)
ALP LIVER SERPL-CCNC: 109 U/L
ALT SERPL-CCNC: 15 U/L
ANION GAP SERPL CALC-SCNC: 7 MMOL/L (ref 0–18)
AST SERPL-CCNC: 26 U/L (ref ?–34)
BASOPHILS # BLD AUTO: 0.06 X10(3) UL (ref 0–0.2)
BASOPHILS NFR BLD AUTO: 0.7 %
BILIRUB SERPL-MCNC: 0.3 MG/DL (ref 0.2–1.1)
BUN BLD-MCNC: 18 MG/DL (ref 9–23)
CALCIUM BLD-MCNC: 10 MG/DL (ref 8.7–10.4)
CHLORIDE SERPL-SCNC: 105 MMOL/L (ref 98–112)
CO2 SERPL-SCNC: 29 MMOL/L (ref 21–32)
CREAT BLD-MCNC: 1.23 MG/DL
DEPRECATED HBV CORE AB SER IA-ACNC: 33 NG/ML
EGFRCR SERPLBLD CKD-EPI 2021: 48 ML/MIN/1.73M2 (ref 60–?)
EOSINOPHIL # BLD AUTO: 0.18 X10(3) UL (ref 0–0.7)
EOSINOPHIL NFR BLD AUTO: 2.2 %
ERYTHROCYTE [DISTWIDTH] IN BLOOD BY AUTOMATED COUNT: 14.4 %
FASTING STATUS PATIENT QL REPORTED: YES
GLOBULIN PLAS-MCNC: 2.6 G/DL (ref 2–3.5)
GLUCOSE BLD-MCNC: 96 MG/DL (ref 70–99)
HCT VFR BLD AUTO: 43.8 %
HGB BLD-MCNC: 13.9 G/DL
IMM GRANULOCYTES # BLD AUTO: 0.01 X10(3) UL (ref 0–1)
IMM GRANULOCYTES NFR BLD: 0.1 %
LYMPHOCYTES # BLD AUTO: 2.14 X10(3) UL (ref 1–4)
LYMPHOCYTES NFR BLD AUTO: 25.8 %
MCH RBC QN AUTO: 28.5 PG (ref 26–34)
MCHC RBC AUTO-ENTMCNC: 31.7 G/DL (ref 31–37)
MCV RBC AUTO: 89.9 FL
MONOCYTES # BLD AUTO: 0.56 X10(3) UL (ref 0.1–1)
MONOCYTES NFR BLD AUTO: 6.7 %
NEUTROPHILS # BLD AUTO: 5.36 X10 (3) UL (ref 1.5–7.7)
NEUTROPHILS # BLD AUTO: 5.36 X10(3) UL (ref 1.5–7.7)
NEUTROPHILS NFR BLD AUTO: 64.5 %
OSMOLALITY SERPL CALC.SUM OF ELEC: 294 MOSM/KG (ref 275–295)
PLATELET # BLD AUTO: 282 10(3)UL (ref 150–450)
POTASSIUM SERPL-SCNC: 4.2 MMOL/L (ref 3.5–5.1)
PROT SERPL-MCNC: 7.5 G/DL (ref 5.7–8.2)
RBC # BLD AUTO: 4.87 X10(6)UL
SODIUM SERPL-SCNC: 141 MMOL/L (ref 136–145)
T4 FREE SERPL-MCNC: 1.6 NG/DL (ref 0.8–1.7)
TSI SER-ACNC: 0.94 MIU/ML (ref 0.55–4.78)
WBC # BLD AUTO: 8.3 X10(3) UL (ref 4–11)

## 2024-09-26 PROCEDURE — 80050 GENERAL HEALTH PANEL: CPT | Performed by: FAMILY MEDICINE

## 2024-09-26 PROCEDURE — 82728 ASSAY OF FERRITIN: CPT | Performed by: FAMILY MEDICINE

## 2024-09-26 PROCEDURE — 84439 ASSAY OF FREE THYROXINE: CPT | Performed by: FAMILY MEDICINE

## 2024-10-09 DIAGNOSIS — E03.9 HYPOTHYROIDISM, UNSPECIFIED TYPE: ICD-10-CM

## 2024-10-09 RX ORDER — LEVOTHYROXINE SODIUM 75 UG/1
75 TABLET ORAL
Qty: 90 TABLET | Refills: 0 | Status: SHIPPED | OUTPATIENT
Start: 2024-10-09

## 2024-10-15 ENCOUNTER — OFFICE VISIT (OUTPATIENT)
Dept: FAMILY MEDICINE CLINIC | Facility: CLINIC | Age: 69
End: 2024-10-15
Payer: COMMERCIAL

## 2024-10-15 VITALS
RESPIRATION RATE: 16 BRPM | DIASTOLIC BLOOD PRESSURE: 82 MMHG | HEIGHT: 65 IN | OXYGEN SATURATION: 100 % | BODY MASS INDEX: 25.99 KG/M2 | HEART RATE: 64 BPM | WEIGHT: 156 LBS | SYSTOLIC BLOOD PRESSURE: 136 MMHG

## 2024-10-15 DIAGNOSIS — T82.858S: ICD-10-CM

## 2024-10-15 DIAGNOSIS — R93.89 ABNORMAL MRI: Primary | ICD-10-CM

## 2024-10-15 DIAGNOSIS — F43.9 STRESS: ICD-10-CM

## 2024-10-15 DIAGNOSIS — I10 ESSENTIAL HYPERTENSION: ICD-10-CM

## 2024-10-15 DIAGNOSIS — R42 VERTIGO: ICD-10-CM

## 2024-10-15 DIAGNOSIS — E03.9 HYPOTHYROIDISM, UNSPECIFIED TYPE: ICD-10-CM

## 2024-10-15 DIAGNOSIS — Z85.820 HISTORY OF MELANOMA: ICD-10-CM

## 2024-10-15 PROCEDURE — 3079F DIAST BP 80-89 MM HG: CPT | Performed by: FAMILY MEDICINE

## 2024-10-15 PROCEDURE — 90656 IIV3 VACC NO PRSV 0.5 ML IM: CPT | Performed by: FAMILY MEDICINE

## 2024-10-15 PROCEDURE — 99214 OFFICE O/P EST MOD 30 MIN: CPT | Performed by: FAMILY MEDICINE

## 2024-10-15 PROCEDURE — 90471 IMMUNIZATION ADMIN: CPT | Performed by: FAMILY MEDICINE

## 2024-10-15 PROCEDURE — 3008F BODY MASS INDEX DOCD: CPT | Performed by: FAMILY MEDICINE

## 2024-10-15 PROCEDURE — 3075F SYST BP GE 130 - 139MM HG: CPT | Performed by: FAMILY MEDICINE

## 2024-10-15 NOTE — PROGRESS NOTES
HPI:   Lashonda Bullock is a 69 year old female who presents vertigo follow up    Daughter present     Discussed all results - labs and imaging   Pt is on statin and asa   Daughter present     Pt is not feeling any better nor worse  Room spinning when looking up and bending down     Dizziness started August 21st - lasted 10 minutes   Pt has had it every 2-3 daysx - lasting a few seconds  No focal neuro symptoms   Aggravated by turning head and bending down   Pt has had more HA's   H/o melanoma     Was on sudafed / off now   Normal blood pressure     H/o vertigo years ago   Pt a cva and vertigo when she was 10 weeks PP / h/o preeclampsia   Age 41 subclavian bypass     Quit smoking   Patient denies chest pain, shortness of breath  No exertional symptoms.    Not taking any allergy meds    Still under tremendous stress         Component      Latest Ref Rng 12/18/2023 3/6/2024 6/26/2024 9/26/2024   Glucose      70 - 99 mg/dL 100 (H)  76  100 (H)  96    Sodium      136 - 145 mmol/L 141  138  143  141    Potassium      3.5 - 5.1 mmol/L 4.0  3.9  4.1  4.2    Chloride      98 - 112 mmol/L 110  106  110  105    Carbon Dioxide, Total      21.0 - 32.0 mmol/L 24.0  26.0  27.0  29.0    ANION GAP      0 - 18 mmol/L 7  6  6  7    BUN      9 - 23 mg/dL 17  22  17  18    CREATININE      0.55 - 1.02 mg/dL 1.24 (H)  1.25 (H)  1.26 (H)  1.23 (H)    CALCIUM      8.7 - 10.4 mg/dL 9.3  9.5  9.1  10.0    CALCULATED OSMOLALITY      275 - 295 mOsm/kg 294  288  298 (H)  294    EGFR      >=60 mL/min/1.73m2 48 (L)  47 (L)  47 (L)  48 (L)    AST (SGOT)      <34 U/L 22  37  18  26    ALT (SGPT)      10 - 49 U/L 25  25  23  15    ALKALINE PHOSPHATASE      55 - 142 U/L 98  122  113  109    Total Bilirubin      0.2 - 1.1 mg/dL 0.5  0.3  0.4  0.3    PROTEIN, TOTAL      5.7 - 8.2 g/dL 7.3  7.6  7.2  7.5    Albumin      3.2 - 4.8 g/dL 4.3  4.0  3.9  4.9 (H)    Globulin      2.0 - 3.5 g/dL 3.0  3.6  3.3  2.6    A/G Ratio      1.0 - 2.0  1.4  1.1  1.2  1.9     Patient Fasting for CMP? Yes  Yes  Yes  Yes    Cholesterol, Total      <200 mg/dL 198  277 (H)  202 (H)     HDL Cholesterol      40 - 59 mg/dL 78 (H)  83 (H)  75 (H)     Triglycerides      30 - 149 mg/dL 87  83  60     LDL Cholesterol Calc      <100 mg/dL 105 (H)  180 (H)  116 (H)     VLDL      0 - 30 mg/dL 15  17  10     NON-HDL CHOLESTEROL      <130 mg/dL 120  194 (H)  127     Patient Fasting for Lipid? Yes  Yes  Yes     HEMOGLOBIN A1c      <5.7 % 5.8 (H)  6.0 (H)  5.7 (H)     ESTIMATED AVERAGE GLUCOSE      68 - 126 mg/dL 120  126  117     ANTI-THYROGLOBULIN      <60 U/mL  <15      ANTI-THYROPEROXIDASE      <60 U/mL  33      TSH      0.550 - 4.780 mIU/mL  0.356 (L)  0.294 (L)  0.939    T3 FREE      2.40 - 4.20 pg/mL  2.20 (L)      T4,Free (Direct)      0.8 - 1.7 ng/dL  1.4  1.3  1.6    Vitamin B12      193 - 986 pg/mL  820      Magnesium, Serum      1.6 - 2.6 mg/dL  2.2      VITAMIN D, 25-OH, TOTAL      30.0 - 100.0 ng/mL  51.1      FERRITIN      50 - 306 ng/mL    33 (L)       Legend:  (H) High  (L) Low    Current Outpatient Medications   Medication Sig Dispense Refill    LEVOTHYROXINE 75 MCG Oral Tab TAKE 1 TABLET BY MOUTH IN THE MORNING BEFORE BREAKFAST 90 tablet 0    hydroxychloroquine (PLAQUENIL) 200 MG Oral Tab Take 1 tablet (200 mg total) by mouth 2 (two) times daily. 180 tablet 0    fluticasone propionate 50 MCG/ACT Nasal Suspension 2 sprays by Nasal route nightly. 3 each 0    PANTOPRAZOLE 40 MG Oral Tab EC TAKE 1 TABLET BY MOUTH EVERY DAY 30 MINUTES BEFORE BREAKFAST 90 tablet 0    lactulose 10 GM/15ML Oral Solution TAKE 30 ML BY MOUTH THREE TIMES DAILY 2700 mL 1    Mirabegron ER (MYRBETRIQ) 50 MG Oral Tablet 24 Hr Take 1 tablet (50 mg total) by mouth daily. 90 tablet 3    metFORMIN 500 MG Oral Tab Take 1 tablet (500 mg total) by mouth 2 (two) times daily with meals. 180 tablet 1    metoprolol tartrate 25 MG Oral Tab Take 2 tablets (50 mg total) by mouth daily. 180 tablet 1    simvastatin 20 MG Oral Tab  Take 1 tablet (20 mg total) by mouth every evening. 90 tablet 1    acidophilus-pectin Oral Cap Take 1 capsule by mouth daily.      Cyclobenzaprine HCl 5 MG Oral Tab Take 1 tablet (5 mg total) by mouth 3 (three) times daily as needed for Muscle spasms. 30 tablet 0    ondansetron 4 MG Oral Tablet Dispersible Take 1 tablet (4 mg total) by mouth every 8 (eight) hours as needed for Nausea. 10 tablet 0    EPINEPHrine (EPIPEN) 0.3 MG/0.3ML Injection Solution Auto-injector Inject into skin as needed for severe allergic reaction 1 each 0    aspirin 81 MG Oral Tab Take 1 tablet (81 mg total) by mouth daily.      Cholecalciferol (VITAMIN D-3 OR) Take 1,000 Units by mouth daily.        VITAMIN E Take 400 mg by mouth daily.        Cyanocobalamin (VITAMIN B-12 CR OR) Take  by mouth.      Calcium Carbonate (CALTRATE 600 OR) Take  by mouth.        Past Medical History:    Abdominal distention    Abdominal pain    Acute, but ill-defined, cerebrovascular disease    Anxiety    Arthritis    Basal cell carcinoma of skin    Bleeding nose    Bloating    Blood in urine    Calculus of kidney    Constipation    Depressive disorder, not elsewhere classified    Diverticulitis    Easy bruising    Endometriosis    Endometriosis, site unspecified    Fatigue    Frequent urination    Frequent UTI    Headache disorder    Heartburn    Hemorrhoids    Indigestion    Insomnia, unspecified    Irregular bowel habits    Leaking of urine    Leg swelling    Neuralgia    Night sweats    Osteoporosis    Pain with bowel movements    Skin blushing/flushing    Sleep disturbance    Stool incontinence    Stress    Thyroid disease    Wears glasses    Weight gain      Past Surgical History:   Procedure Laterality Date    Appendectomy      Appendectomy      Breast surgery procedure unlisted      lumpectomy    Cholecystectomy      Colonoscopy      Egd      Hysterectomy  1991    total hystero.    [facility] localization wire 1 site left (cpt=19281)  1999    ADH    [facility]  localization wire 1 site right (cpt=19281) Right     benign.    [facility] localization wire 2 site right (cpt=19281/35214) Right     benign.    Needle biopsy right      US guided biopsy-benign    Oophorectomy Bilateral     total hystero.    Other  00'    bronchoscopy w excision of tumor    Other      thoracotomy    Other surgical history      gallbladder    Other surgical history      thyroidectomy    Sigmoidoscopy,diagnostic      Thyroidectomy      Tonsillectomy      adenoidectomy    Total abdom hysterectomy        Family History   Problem Relation Age of Onset    Cancer Maternal Grandmother     Breast Cancer Maternal Grandmother 69    Cancer Paternal Grandfather     Cancer Paternal Grandmother     Hypertension Father     Colon Polyps Father     Other (acute MI) Father     Other (CHF) Maternal Grandfather     Ovarian Cancer Mother 69    Colon Polyps Mother     Other (fallopian tube cancer) Mother       Social History:   Social History     Socioeconomic History    Marital status:    Tobacco Use    Smoking status: Former     Current packs/day: 0.00     Average packs/day: 0.3 packs/day for 20.0 years (5.0 ttl pk-yrs)     Types: Cigarettes     Start date: 1997     Quit date: 2017     Years since quittin.4    Smokeless tobacco: Never   Vaping Use    Vaping status: Never Used   Substance and Sexual Activity    Alcohol use: No     Alcohol/week: 0.0 standard drinks of alcohol     Comment: socially    Drug use: No   Other Topics Concern    Caffeine Concern Yes     Comment: 1-2 cups daily    Exercise Yes     Comment: active     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  Diet: watches minimally     REVIEW OF SYSTEMS:   GENERAL: feels well otherwise  SKIN: denies any unusual skin lesions  EYES:denies blurred vision or double vision  HEENT: denies nasal congestion, sinus pain or ST  LUNGS: denies shortness of breath with exertion  CARDIOVASCULAR: denies chest pain on  exertion  GI: denies abdominal pain,denies heartburn  MUSCULOSKELETAL: denies back pain  NEURO: denies headaches  PSYCHE: denies depression or anxiety  HEMATOLOGIC: denies hx of anemia  ALL/ASTHMA: denies asthma    EXAM:   /82   Pulse 64   Resp 16   Ht 5' 5\" (1.651 m)   Wt 156 lb (70.8 kg)   SpO2 100%   BMI 25.96 kg/m²   Body mass index is 25.96 kg/m².   GENERAL: alert and oriented X 3, well developed, well nourished,in no apparent distress  NEURO: cranial nerves are intact,motor and sensory are grossly intact    ASSESSMENT AND PLAN:   Lashonda Bullock is a 69 year old female who presents with     1. Abnormal MRI    - Neuro Referral - In Network    2. Vertigo    - Physical Therapy Referral - [facility]  - Neuro Referral - In Network  - ENT Referral - In Network    3. Hypothyroidism, unspecified type      4. Essential hypertension  Cpm     5. Stenosis of subclavian bypass, sequela    - Neuro Referral - In Network    6. History of melanoma      7. Stress  Monitor closely       Questions answered and patient indicates understanding of these issues and agrees to the plan.  Follow up in 3 mo or sooner if needed.

## 2024-10-18 ENCOUNTER — OFFICE VISIT (OUTPATIENT)
Dept: RHEUMATOLOGY | Facility: CLINIC | Age: 69
End: 2024-10-18
Payer: COMMERCIAL

## 2024-10-18 VITALS
SYSTOLIC BLOOD PRESSURE: 118 MMHG | HEART RATE: 63 BPM | HEIGHT: 65 IN | OXYGEN SATURATION: 96 % | DIASTOLIC BLOOD PRESSURE: 78 MMHG | TEMPERATURE: 97 F | RESPIRATION RATE: 16 BRPM | BODY MASS INDEX: 25.83 KG/M2 | WEIGHT: 155 LBS

## 2024-10-18 DIAGNOSIS — M35.9 UNDIFFERENTIATED CONNECTIVE TISSUE DISEASE (HCC): ICD-10-CM

## 2024-10-18 DIAGNOSIS — G25.81 RESTLESS LEG SYNDROME: Primary | ICD-10-CM

## 2024-10-18 PROCEDURE — 99214 OFFICE O/P EST MOD 30 MIN: CPT | Performed by: INTERNAL MEDICINE

## 2024-10-18 PROCEDURE — 3078F DIAST BP <80 MM HG: CPT | Performed by: INTERNAL MEDICINE

## 2024-10-18 PROCEDURE — 3074F SYST BP LT 130 MM HG: CPT | Performed by: INTERNAL MEDICINE

## 2024-10-18 PROCEDURE — 3008F BODY MASS INDEX DOCD: CPT | Performed by: INTERNAL MEDICINE

## 2024-10-18 RX ORDER — HYDROXYCHLOROQUINE SULFATE 200 MG/1
200 TABLET, FILM COATED ORAL 2 TIMES DAILY
Qty: 180 TABLET | Refills: 3 | Status: SHIPPED | OUTPATIENT
Start: 2024-10-18

## 2024-10-18 NOTE — PROGRESS NOTES
[facility]  Dr. Corea Progress Note     Subjective:   Lashonad Bullock is a(n) 69 year old female.   Current complaints:   Chief Complaint   Patient presents with    Follow - Up     LOV: 5/10/23  Patient is here for a follow up visit. Patient states that her hands, wrists, knees are what trouble her the most (6/10), on bad days. Legs cramp at night frequently.  Rapid 3: 1.7   1 Year follow-up for   UCTD/positive JOSE MARTIN.   On Plaquenil 200 mg twice a day.    Feeling  ok.  Joint pain- knees, hips, hands.   Was temporaily out of plaquenil and noticed increased joint pain.  Joint swelling  no.    Leg cramps at night.   Father was moved 30 minutes away and he is in bad health, dementia.  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   Overall things under control as long as she takes her Plaquenil.  No rashes.  No chest pain.    Objective:   /78   Pulse 63   Temp 97.4 °F (36.3 °C)   Resp 16   Ht 5' 5\" (1.651 m)   Wt 155 lb (70.3 kg)   SpO2 96%   BMI 25.79 kg/m²   Lungs clear  Heart nsr    Joints ok.  Hands negative  Knees ok  No leg edema   Skin - No rash  9/26/24 glucose 96.  Sodium 141 potassium 4.2 chloride 105 BUN 18 creatinine 1.23 GFR 48.  Calcium 10.0.  Alkaline phosphatase 109 AST 26 ALT 15 bilirubin  0.3 globulin 2.6 ferritin 33  Total protein 7.5 albumin 4.9.  TSH 4.939.  Free T4  -1.6.  White count 8.3 hemoglobin 13.9 hematocrit 43.8 platelet count 282,000  Assessment:     Encounter Diagnoses   Name Primary?    Undifferentiated connective tissue disease (HCC)     Restless leg syndrome Yes   UCTD  stable .  RLS at times.    Plan:     Patient Instructions   Continue Plaquenil 200 mg twice a day.    Eye exam yearly.  ES tylenol prn  Diclofenac gel can be rubbed into the sore joints , or muscles at night for leg cramps.   Also for leg cramps, cyclobenzaprine can be used 5 mg at night.    Return to office  1 year.          Erick Corea MD 10/18/2024 9:50 AM

## 2024-10-18 NOTE — PATIENT INSTRUCTIONS
Continue Plaquenil 200 mg twice a day.    Eye exam yearly.  ES tylenol prn  Diclofenac gel can be rubbed into the sore joints , or muscles at night for leg cramps.   Also for leg cramps, cyclobenzaprine can be used 5 mg at night.    Return to office  1 year.

## 2024-10-24 ENCOUNTER — TELEPHONE (OUTPATIENT)
Dept: PHYSICAL THERAPY | Facility: HOSPITAL | Age: 69
End: 2024-10-24

## 2024-10-24 DIAGNOSIS — E78.00 ELEVATED CHOLESTEROL: ICD-10-CM

## 2024-10-24 RX ORDER — SIMVASTATIN 20 MG
20 TABLET ORAL EVERY EVENING
Qty: 90 TABLET | Refills: 0 | Status: SHIPPED | OUTPATIENT
Start: 2024-10-24

## 2024-11-05 ENCOUNTER — OFFICE VISIT (OUTPATIENT)
Facility: LOCATION | Age: 69
End: 2024-11-05
Payer: COMMERCIAL

## 2024-11-05 DIAGNOSIS — R42 DIZZY: ICD-10-CM

## 2024-11-05 DIAGNOSIS — H93.8X1 EAR PRESSURE, RIGHT: ICD-10-CM

## 2024-11-05 DIAGNOSIS — H81.10 BENIGN PAROXYSMAL POSITIONAL VERTIGO, UNSPECIFIED LATERALITY: Primary | ICD-10-CM

## 2024-11-05 DIAGNOSIS — H93.293 ABNORMAL AUDITORY PERCEPTION, BILATERAL: Primary | ICD-10-CM

## 2024-11-05 DIAGNOSIS — H93.11 RIGHT-SIDED TINNITUS: ICD-10-CM

## 2024-11-05 PROCEDURE — 99243 OFF/OP CNSLTJ NEW/EST LOW 30: CPT | Performed by: STUDENT IN AN ORGANIZED HEALTH CARE EDUCATION/TRAINING PROGRAM

## 2024-11-05 PROCEDURE — 92567 TYMPANOMETRY: CPT | Performed by: AUDIOLOGIST

## 2024-11-05 PROCEDURE — 92557 COMPREHENSIVE HEARING TEST: CPT | Performed by: AUDIOLOGIST

## 2024-11-05 RX ORDER — MECLIZINE HYDROCHLORIDE 25 MG/1
12.5 TABLET ORAL 3 TIMES DAILY PRN
Qty: 90 TABLET | Refills: 2 | Status: SHIPPED | OUTPATIENT
Start: 2024-11-05 | End: 2025-05-04

## 2024-11-05 NOTE — PROGRESS NOTES
Lashonda Bullock was seen for an audiometric evaluation and tympanogram today. Referred back to physician.    April López, AuD

## 2024-11-05 NOTE — PROGRESS NOTES
Lashonda Bullock is a 69 year old female referred by Dr. Cheng for evaluation of vertigo.  Chief Complaint   Patient presents with    Dizziness     For 4 mths now      HPI:   69 year old female hx of stroke at age 29, seizures presenting for evaluation of vertigo. Her first episode onset in July along with right aural pressure and lasted approximately 5 minutes. She has had intermittent episodes since then, all more brief. She also had muffled hearing but denies tinnitus, otalgia and otorrhea. Hearing is normal between episodes. Pt has had falls due to her vertigo. She does have a history of migraines which have been occurring more frequently. She is scheduled to start vestibular therapy next week.     Quit smoking 7 years ago, smoked 1 ppd x 25 years  Current Outpatient Medications   Medication Sig Dispense Refill    lactulose 10 GM/15ML Oral Solution TAKE 30 ML BY MOUTH THREE TIMES DAILY 2700 mL 0    SIMVASTATIN 20 MG Oral Tab TAKE 1 TABLET BY MOUTH IN THE EVENING 90 tablet 0    hydroxychloroquine (PLAQUENIL) 200 MG Oral Tab Take 1 tablet (200 mg total) by mouth 2 (two) times daily. 180 tablet 3    LEVOTHYROXINE 75 MCG Oral Tab TAKE 1 TABLET BY MOUTH IN THE MORNING BEFORE BREAKFAST 90 tablet 0    fluticasone propionate 50 MCG/ACT Nasal Suspension 2 sprays by Nasal route nightly. 3 each 0    PANTOPRAZOLE 40 MG Oral Tab EC TAKE 1 TABLET BY MOUTH EVERY DAY 30 MINUTES BEFORE BREAKFAST 90 tablet 0    Mirabegron ER (MYRBETRIQ) 50 MG Oral Tablet 24 Hr Take 1 tablet (50 mg total) by mouth daily. 90 tablet 3    metFORMIN 500 MG Oral Tab Take 1 tablet (500 mg total) by mouth 2 (two) times daily with meals. 180 tablet 1    metoprolol tartrate 25 MG Oral Tab Take 2 tablets (50 mg total) by mouth daily. 180 tablet 1    acidophilus-pectin Oral Cap Take 1 capsule by mouth daily.      Cyclobenzaprine HCl 5 MG Oral Tab Take 1 tablet (5 mg total) by mouth 3 (three) times daily as needed for Muscle spasms. 30 tablet 0    ondansetron  4 MG Oral Tablet Dispersible Take 1 tablet (4 mg total) by mouth every 8 (eight) hours as needed for Nausea. 10 tablet 0    EPINEPHrine (EPIPEN) 0.3 MG/0.3ML Injection Solution Auto-injector Inject into skin as needed for severe allergic reaction 1 each 0    aspirin 81 MG Oral Tab Take 1 tablet (81 mg total) by mouth daily.      Cholecalciferol (VITAMIN D-3 OR) Take 1,000 Units by mouth daily.        VITAMIN E Take 400 mg by mouth daily.        Cyanocobalamin (VITAMIN B-12 CR OR) Take  by mouth.      Calcium Carbonate (CALTRATE 600 OR) Take  by mouth.        Past Medical History:    Abdominal distention    Abdominal pain    Acute, but ill-defined, cerebrovascular disease    Anxiety    Arthritis    Basal cell carcinoma of skin    Bleeding nose    Bloating    Blood in urine    Calculus of kidney    Constipation    Depressive disorder, not elsewhere classified    Diverticulitis    Easy bruising    Endometriosis    Endometriosis, site unspecified    Fatigue    Frequent urination    Frequent UTI    Headache disorder    Heartburn    Hemorrhoids    Indigestion    Insomnia, unspecified    Irregular bowel habits    Leaking of urine    Leg swelling    Neuralgia    Night sweats    Osteoporosis    Pain with bowel movements    Skin blushing/flushing    Sleep disturbance    Stool incontinence    Stress    Thyroid disease    Wears glasses    Weight gain      Social History:  Social History     Socioeconomic History    Marital status:    Tobacco Use    Smoking status: Former     Current packs/day: 0.00     Average packs/day: 0.3 packs/day for 20.0 years (5.0 ttl pk-yrs)     Types: Cigarettes     Start date: 1997     Quit date: 2017     Years since quittin.4    Smokeless tobacco: Never   Vaping Use    Vaping status: Never Used   Substance and Sexual Activity    Alcohol use: No     Alcohol/week: 0.0 standard drinks of alcohol     Comment: socially    Drug use: No   Other Topics Concern    Caffeine Concern Yes      Comment: 1-2 cups daily    Exercise Yes     Comment: active      Past Surgical History:   Procedure Laterality Date    Appendectomy      Appendectomy      Breast surgery procedure unlisted      lumpectomy    Cholecystectomy      Colonoscopy      Egd      Hysterectomy  1991    total hystero.    Moreno localization wire 1 site left (cpt=19281)  1999    ADH    Moreno localization wire 1 site right (cpt=19281) Right 1990    benign.    Moreno localization wire 2 site right (cpt=19281/18608) Right 1993    benign.    Needle biopsy right  2012    US guided biopsy-benign    Oophorectomy Bilateral 1989    total hystero.    Other  1/1/00'    bronchoscopy w excision of tumor    Other      thoracotomy    Other surgical history      gallbladder    Other surgical history      thyroidectomy    Sigmoidoscopy,diagnostic      Thyroidectomy      Tonsillectomy      adenoidectomy    Total abdom hysterectomy           REVIEW OF SYSTEMS:   GENERAL HEALTH: feels well otherwise  GENERAL : denies fever, chills, sweats, weight loss, weight gain  SKIN: denies any unusual skin lesions or rashes  RESPIRATORY: denies shortness of breath with exertion  NEURO: denies headaches    EXAM:   There were no vitals taken for this visit.    System Findings Details   Constitutional  Overall appearance - Normal.   Head/Face  Facial features -- Normal. Skull - Normal.   Eyes  Sclera white, pupils equal and round, no nystagmus   Ears  External ears normal in appearance, EAC patent, TM intact, no evidence of middle ear effusion   Nose  External nose normal in appearance, nares patent,   Throat  Posterior pharynx clear, uvula midline, tonsils 1+   Oral cavity  Lips normal in appearance, mucous membranes clear, no masses, FOM soft, tongue without lesions   Neck  Trachea midline, no lymphadenopathy, no masses   Neurological  Memory - Normal. Cranial nerves - Cranial nerves II through XII grossly intact.     Audiogram 11/5/2024  Audiogram: -8000 Hz, bilaterally        MRI IAC 9/26/2024  IMPRESSION:   1.Normal appearing 7th and 8th cranial nerve complexes. No acoustic neuroma. No CP angle mass.   2.No acute intracranial process.   3.5 mm T2 hyperintense structure in the midbrain is nonspecific but is stable dating back to May 19, 2016.   4.Simple appearing subcentimeter cyst in the anterior aspect of the right temporal lobe has increased in size compared to prior and there is a new focus of adjacent gliosis. The finding does not have a worrisome appearance.   5.A few T2/flair hyperintensities in the supratentorial white matter are nonspecific but are commonly attributed to chronic small vessel ischemic disease in a patient this age. These have progressed compared to prior.       ASSESSMENT AND PLAN:   69 year old female hx of stroke at age 29, seizures presenting for evaluation of vertigo.    -Patient with brief, intermittent episodes of vertigo. Differential diagnosis includes benign paroxysmal positional vertigo vs Meniere's disease. Pt with aural pressure but normal audiogram leaning against Meneire's.  -Meclizine 12.5 mg started  -Continue vestibular therapy  -Follow up 4-5 months    The patient indicates understanding of these issues and agrees to the plan.    Thank you for allowing me to participate in the care of this patient.      Tita Huerta MD  11/5/2024  11:25 AM

## 2024-11-18 ENCOUNTER — TELEPHONE (OUTPATIENT)
Dept: PHYSICAL THERAPY | Facility: HOSPITAL | Age: 69
End: 2024-11-18

## 2024-11-20 ENCOUNTER — OFFICE VISIT (OUTPATIENT)
Dept: PHYSICAL THERAPY | Facility: HOSPITAL | Age: 69
End: 2024-11-20
Attending: FAMILY MEDICINE
Payer: COMMERCIAL

## 2024-11-20 DIAGNOSIS — R42 VERTIGO: Primary | ICD-10-CM

## 2024-11-20 PROCEDURE — 97162 PT EVAL MOD COMPLEX 30 MIN: CPT

## 2024-11-20 PROCEDURE — 97110 THERAPEUTIC EXERCISES: CPT

## 2024-11-20 NOTE — PROGRESS NOTES
VESTIBULAR EVALUATION:     Diagnosis:   Vertigo (R42)       Referring Provider: Alicia Cheng  Date of Evaluation:    11/20/2024    Precautions:   PMH of stroke and seizures  Next MD visit:   none scheduled  Date of Surgery: n/a     PATIENT SUMMARY   Lashonda Bullock is a 69 year old female who presents to therapy today with reports of dizziness. Pt reports in July she was going to go on a vacation. She was sitting in a chair and felt the room spinning, this lasted a few days. Went to the doctor and they found lots of fluid in the ear and they prescribed sudafed. Pt felt like she was still having spells of dizziness, they then tried prednisone. This did not work and she had fluid in both ears, they gave her a Z pack and antibiotic. Pt reports her worst experience was when she was helping her dad in the store in his wheelchair and she felt like she could not let go. She had this for 5 min. She gets pressure in the R ear and looking up and down bothers her. No dizziness rolling over in bed. In the beginning it was room spinning, now it is more the lightheaded feeling. Pt does report that when she was young she had frequent ear infections and she had to have a surgery to prevent her from going deaf. PMH of stroke at 29 and seizures, however no seizures recently.    Symptoms with cough/sneeze or loud noise: No  Falls: No  Hx of migraines: Yes: complicated migraines for years, has been increasing to 2x per week   Hx of vision issue: No  Hx of hearing issues:  fullness, tinnitus, sensitivity to noise    Dizziness: Current 0/10  Quality: lightheaded, room spinning at times.   Frequency/Duration:  1-2 min, happens daily a few times   Aggravates: Supine to/from sit, Bending over, Quick head movements, Repetitive movements, Turning/direction changes, Looking/reaching up, Headache, Visual motion, Shopping, Reading, Driving, Passenger in a car, Elevators, and Exertion  Relieves: Not moving and Sitting down    Headache:    Quality:behind the eyes and head   Frequency/Duration:  more frequently since this started, 2x per week     Cervical pain: no    Dizziness Handicap Inventory (DHI): 38     Current functional limitations include heavily cautious with motions.     Past medical history was reviewed with Lashonda. Significant findings include  has a past medical history of Abdominal distention, Abdominal pain, Acute, but ill-defined, cerebrovascular disease, Anxiety, Arthritis, Basal cell carcinoma of skin, Bleeding nose, Bloating, Blood in urine, Calculus of kidney, Constipation, Depressive disorder, not elsewhere classified, Diverticulitis, Easy bruising, Endometriosis, Endometriosis, site unspecified, Fatigue, Frequent urination, Frequent UTI, Headache disorder, Heartburn, Hemorrhoids, Indigestion, Insomnia, unspecified, Irregular bowel habits, Leaking of urine, Leg swelling, Neuralgia (august 2015), Night sweats, Osteoporosis, Pain with bowel movements, Skin blushing/flushing, Sleep disturbance, Stool incontinence, Stress, Thyroid disease, Wears glasses, and Weight gain.      ASSESSMENT  Lashonda presents to physical therapy evaluation with primary c/o dizziness. The results of the objective tests and measures show positive head thrust test, dizziness with VOR/VOR cancellation, negative positional testing, elevated BP, balance to be tested at next session.  Functional deficits include but are not limited to dizziness with daily activities, self limiting activities due to fear of dizziness.  Signs and symptoms are consistent with diagnosis of dizziness with possible differential diagnosis of vestibular migraines, hypofunction and visual motion sensitivity. Pt and PT discussed evaluation findings, pathology, POC. Skilled Physical Therapy is medically necessary to address the above impairments and reach functional goals.    OBJECTIVE:   Physical Exam:  Posture/Observation: unremarkable    Neuro Screen: Sensation: SILT    BP: 142/89 mmHg      Cervical spine ROM: Flex WFL, Ext limited, R SB WFL, L SB WFL , R ROT WFL, L ROT WFL   Adverse neuro signs with ROM: yes no: no     Occulomotor & Vestibulo-Ocular Exam:  Spontaneous Nystagmus: room light: negative ;  fixation blocked: NT  Smooth Pursuit: Negative  Saccades: Negative  Gaze Evoked Nystagmus:  room light: Negative; fixation blocked: Not Tested  Head Thrust: positive with L, negative R   VOR screen:  negative     VOR Cancellation: Negative   Convergence: Not Tested  Cover/Uncover:  Negative  Cross Cover:  Negative  Head Shaking Nystagmus: Not Tested  Dynamic Visual Acuity:  Not Tested    Positional Testing:   Javy-Hallpike: R neg, L neg   Roll Test (HC): neg  Deep head hang: negative      Postural Control:   Next visit     Functional Mobility:   Next visit     Today's Treatment and Response:   Pt education was provided on exam findings, treatment diagnosis, treatment plan, expectations, and prognosis. Pt was also provided recommendations for activity modifications, detrimental fear avoidance behaviors, importance of remaining active, and possible dizziness after evaluation.   Patient was instructed in and issued a HEP for: NA    Charges: PT Eval Moderate Complexity, TE 1    Total Timed Treatment: 10 min     Total Treatment Time: 40 min   TE: pt education on etiology of various dizziness and differential diagnosis    Based on clinical rationale and outcome measures, this evaluation involved Moderate Complexity decision making due to 1-2 personal factors/comorbidities, 3 body structures involved/activity limitations, and evolving symptoms including  changing dizziness .  PLAN OF CARE:    Goals: (to be met in 8 visits)  - Pt will report no dizziness for 3 days or more to improve functional mobility.  - Pt will have negative head thrust test to show improved strength of vestibular system.  - Pt will be able to complete VOR x1 with no symptoms to reduce dizziness while scrolling on computer or phone.  -  Pt will keep therapist updated on symptoms to further guide treatment.  - Pt will be independent with HEP to maintain gains made in therapy.     Frequency / Duration: Patient will be seen for 2 x/week or a total of 8 visits over a 90 day period. Treatment will include: home exercise program development and instruction, patient/family education, balance training, canalith repositioning maneuver, eye/head coordination exercises, sensory organization training, optokinetic stimulation , ROM, exertion training, gait training, manual therapy, and strengthening.     Education or treatment limitation: None   Rehab Potential: good     Patient/Family/Caregiver was advised of these findings, precautions, and treatment options and has agreed to actively participate in planning and for this course of care.     Thank you for your referral. Please co-sign or sign and return this letter via fax as soon as possible to [phone]. If you have any questions, please contact me at Dept: [phone]    Sincerely,  Electronically signed by therapist: Tara Muñoz PT, DPT  Physician's certification required: Yes  I certify the need for these services furnished under this plan of treatment and while under my care.    X___________________________________________________ Date____________________    Certification From: 11/20/2024  To:2/18/2025

## 2024-11-22 ENCOUNTER — APPOINTMENT (OUTPATIENT)
Dept: PHYSICAL THERAPY | Facility: HOSPITAL | Age: 69
End: 2024-11-22
Attending: FAMILY MEDICINE
Payer: COMMERCIAL

## 2024-11-25 ENCOUNTER — OFFICE VISIT (OUTPATIENT)
Dept: PHYSICAL THERAPY | Facility: HOSPITAL | Age: 69
End: 2024-11-25
Attending: FAMILY MEDICINE
Payer: COMMERCIAL

## 2024-11-25 PROCEDURE — 97112 NEUROMUSCULAR REEDUCATION: CPT

## 2024-11-25 NOTE — PROGRESS NOTES
Diagnosis:   Vertigo (R42)      Referring Provider: Alicia Cheng  Date of Evaluation:    11/20/2024    Precautions:  PMH of stroke and seizures  Next MD visit:   none scheduled  Date of Surgery: n/a   Insurance Primary/Secondary: [employer] [place] P[place] / N/A     # Auth Visits: 8            Subjective: Pt is doing well today. After last session had more of a headache than dizziness.     Pain: not chief complaint       Objective:   Initial Eval   Cervical spine ROM: Flex WFL, Ext limited, R SB WFL, L SB WFL , R ROT WFL, L ROT WFL   Adverse neuro signs with ROM: yes no: no      Occulomotor & Vestibulo-Ocular Exam:  Spontaneous Nystagmus: room light: negative ;  fixation blocked: NT  Smooth Pursuit: Negative  Saccades: Negative  Gaze Evoked Nystagmus:  room light: Negative; fixation blocked: Not Tested  Head Thrust: positive with L, negative R   VOR screen:  negative     VOR Cancellation: Negative   Convergence: Not Tested  Cover/Uncover:  Negative  Cross Cover:  Negative  Head Shaking Nystagmus: Not Tested  Dynamic Visual Acuity:  Not Tested     Positional Testing:   Javy-Hallpike: R neg, L neg   Roll Test (HC): neg  Deep head hang: negative         11/25/2024  Postural Control:   NB EO 30 sec, NB EC 30 sec  Semi Tandem: 30 sec R/L   Tandem: 30 sec R/L   SLS: R 5 sec, L 8 sec     NB on airex: 30 sec  NB EC on airex: 20 sec     Functional Mobility:   FGA: 26/30    VOR x1 vertical: brings on symptoms at 3/10      Assessment: Pt attends first follow up after initial eval. Continued exam with balance testing. Pt has WFL static balance, however noted decreased single leg stance bilaterally. Pt scores a 26/30 on the FGA, indicating not a fall risk, however pt notes increased dizziness with vertical head turns, pivot turn and imbalance with walking backwards and forwards with eyes closed. Assigned pt HEP today with vertical VOR x1, pt reports dizziness at 3/10 after completing. Instructed pt on parameters for all exercises and what  dizziness severity to keep within. Continue per plan of care to achieve goals set for therapy.       Goals:    (to be met in 8 visits)  - Pt will report no dizziness for 3 days or more to improve functional mobility.  - Pt will have negative head thrust test to show improved strength of vestibular system.  - Pt will be able to complete VOR x1 with no symptoms to reduce dizziness while scrolling on computer or phone.  - Pt will keep therapist updated on symptoms to further guide treatment.  - Pt will be independent with HEP to maintain gains made in therapy.     Plan: Continue per plan of care.   Date: 11/25/2024  TX#: 2/8 Date:                 TX#: 3/ Date:                 TX#: 4/ Date:                 TX#: 5/ Date:   Tx#: 6/   NR:40 min  Continuation of exam above  VOR x1 vertical, 1x30 sec, 2x45 sec  Standing NB with horizontal head turns, 2x10   Seated diagonal chop, 2x10 R/L   Tandem walking on airex beam, 3 laps   Side stepping on airex, 3 laps                             HEP:   Access Code: [email or web address]  URL: [email or web address]  Date: 11/25/2024  Prepared by: Tara Muñoz  Exercises  - Seated Gaze Stabilization with Head Nod  - 3 x daily - 7 x weekly - 1 sets - 3 reps - 45 sec hold  - Single Leg Stance with Support  - 1 x daily - 7 x weekly - 1 sets - 3 reps - 30 sec hold  - Romberg Stance with Head Rotation  - 1 x daily - 7 x weekly - 2 sets - 10 reps    Charges: NR 3       Total Timed Treatment: 40 min  Total Treatment Time: 40 min

## 2024-11-27 ENCOUNTER — APPOINTMENT (OUTPATIENT)
Dept: PHYSICAL THERAPY | Facility: HOSPITAL | Age: 69
End: 2024-11-27
Attending: FAMILY MEDICINE
Payer: COMMERCIAL

## 2024-12-04 ENCOUNTER — OFFICE VISIT (OUTPATIENT)
Dept: PHYSICAL THERAPY | Facility: HOSPITAL | Age: 69
End: 2024-12-04
Attending: FAMILY MEDICINE
Payer: COMMERCIAL

## 2024-12-04 PROCEDURE — 97112 NEUROMUSCULAR REEDUCATION: CPT

## 2024-12-04 PROCEDURE — 97110 THERAPEUTIC EXERCISES: CPT

## 2024-12-06 ENCOUNTER — APPOINTMENT (OUTPATIENT)
Dept: PHYSICAL THERAPY | Facility: HOSPITAL | Age: 69
End: 2024-12-06
Attending: FAMILY MEDICINE
Payer: COMMERCIAL

## 2024-12-11 ENCOUNTER — APPOINTMENT (OUTPATIENT)
Dept: PHYSICAL THERAPY | Facility: HOSPITAL | Age: 69
End: 2024-12-11
Attending: FAMILY MEDICINE
Payer: COMMERCIAL

## 2024-12-13 ENCOUNTER — APPOINTMENT (OUTPATIENT)
Dept: PHYSICAL THERAPY | Facility: HOSPITAL | Age: 69
End: 2024-12-13
Attending: FAMILY MEDICINE
Payer: COMMERCIAL

## 2024-12-18 ENCOUNTER — APPOINTMENT (OUTPATIENT)
Dept: PHYSICAL THERAPY | Facility: HOSPITAL | Age: 69
End: 2024-12-18
Attending: FAMILY MEDICINE
Payer: COMMERCIAL

## 2024-12-18 ENCOUNTER — TELEPHONE (OUTPATIENT)
Dept: PHYSICAL THERAPY | Facility: HOSPITAL | Age: 69
End: 2024-12-18

## 2024-12-20 ENCOUNTER — APPOINTMENT (OUTPATIENT)
Dept: PHYSICAL THERAPY | Facility: HOSPITAL | Age: 69
End: 2024-12-20
Attending: FAMILY MEDICINE
Payer: COMMERCIAL

## 2024-12-23 ENCOUNTER — OFFICE VISIT (OUTPATIENT)
Dept: NEUROLOGY | Facility: CLINIC | Age: 69
End: 2024-12-23
Payer: COMMERCIAL

## 2024-12-23 VITALS
HEIGHT: 65 IN | SYSTOLIC BLOOD PRESSURE: 140 MMHG | DIASTOLIC BLOOD PRESSURE: 80 MMHG | BODY MASS INDEX: 25.49 KG/M2 | HEART RATE: 64 BPM | OXYGEN SATURATION: 92 % | RESPIRATION RATE: 16 BRPM | WEIGHT: 153 LBS

## 2024-12-23 DIAGNOSIS — G47.00 INSOMNIA, UNSPECIFIED TYPE: ICD-10-CM

## 2024-12-23 DIAGNOSIS — F43.20 ADJUSTMENT DISORDER, UNSPECIFIED TYPE: ICD-10-CM

## 2024-12-23 DIAGNOSIS — I63.9 BRAINSTEM STROKE (HCC): ICD-10-CM

## 2024-12-23 DIAGNOSIS — G43.109 MIGRAINE WITH AURA AND WITHOUT STATUS MIGRAINOSUS, NOT INTRACTABLE: ICD-10-CM

## 2024-12-23 DIAGNOSIS — R93.0 ABNORMAL MRI OF HEAD: Primary | ICD-10-CM

## 2024-12-23 DIAGNOSIS — G93.0 CYST OF BRAIN: ICD-10-CM

## 2024-12-23 PROCEDURE — 99204 OFFICE O/P NEW MOD 45 MIN: CPT | Performed by: OTHER

## 2024-12-23 PROCEDURE — 3077F SYST BP >= 140 MM HG: CPT | Performed by: OTHER

## 2024-12-23 PROCEDURE — 3008F BODY MASS INDEX DOCD: CPT | Performed by: OTHER

## 2024-12-23 PROCEDURE — 3079F DIAST BP 80-89 MM HG: CPT | Performed by: OTHER

## 2024-12-23 RX ORDER — TOPIRAMATE 25 MG/1
TABLET, FILM COATED ORAL
Qty: 90 TABLET | Refills: 1 | Status: SHIPPED | OUTPATIENT
Start: 2024-12-23

## 2024-12-23 NOTE — PATIENT INSTRUCTIONS
Refill policies:    Allow 2-3 business days for refills; controlled substances may take longer.  Contact your pharmacy at least 5 days prior to running out of medication and have them send an electronic request or submit request through the “request refill” option in your [employer] account.  Refills are not addressed on weekends; covering physicians do not authorize routine medications on weekends.  No narcotics or controlled substances are refilled after noon on Fridays or by on call physicians.  By law, narcotics must be electronically prescribed.  A 30 day supply with no refills is the maximum allowed.  If your prescription is due for a refill, you may be due for a follow up appointment.  To best provide you care, patients receiving routine medications need to be seen at least once a year.  Patients receiving narcotic/controlled substance medications need to be seen at least once every 3 months.  In the event that your preferred pharmacy does not have the requested medication in stock (e.g. Backordered), it is your responsibility to find another pharmacy that has the requested medication available.  We will gladly send a new prescription to that pharmacy at your request.    Scheduling Tests:    If your physician has ordered radiology tests such as MRI or CT scans, please contact Central Scheduling at [phone] right away to schedule the test.  Once scheduled, the [facility] include [facility] beds at the [facility] Centralized Referral Team will work with your insurance carrier to obtain pre-certification or prior authorization.  Depending on your insurance carrier, approval may take 3-10 days.  It is highly recommended patients assure they have received an authorization before having a test performed.  If test is done without insurance authorization, patient may be responsible for the entire amount billed.      Precertification and Prior Authorizations:  If your physician has recommended that you have a procedure or additional testing performed the [facility] include [facility] beds at the [facility]  Centralized Referral Team will contact your insurance carrier to obtain pre-certification or prior authorization.    You are strongly encouraged to contact your insurance carrier to verify that your procedure/test has been approved and is a COVERED benefit.  Although the [facility] Centralized Referral Team does its due diligence, the insurance carrier gives the disclaimer that \"Although the procedure is authorized, this does not guarantee payment.\"    Ultimately the patient is responsible for payment.   Thank you for your understanding in this matter.  Paperwork Completion:  If you require FMLA or disability paperwork for your recovery, please make sure to either drop it off or have it faxed to our office at [phone]. Be sure the form has your name and date of birth on it.  The form will be faxed to our Forms Department and they will complete it for you.  There is a 25$ fee for all forms that need to be filled out.  Please be aware there is a 10-14 day turnaround time.  You will need to sign a release of information (LOLA) form if your paperwork does not come with one.  You may call the Forms Department with any questions at [phone].  Their fax number is [phone].          Recommended Resources for Alzheimer's Disease are available at the [employer], [facility].      Web site:  Http://[email or web address].org/[place]/  Phone number 1-730.203.2113    Start here when researching additional information about the disease. They offer comprehensive services from basic information about the disease, assistance in navigating care, legal advice, patient support, caregiver support, support groups,  community resources, and research opportunities.

## 2024-12-23 NOTE — PROGRESS NOTES
Patient is here today stating she had a few test done in September she had a CT scan done.  Patient stated she was DX with Vertigo

## 2024-12-23 NOTE — PROGRESS NOTES
[facility]   Neurology    Lashonda Bullock Patient Status:  No patient class for patient encounter    3/9/1955 MRN [id number]   Location [facility], [place], [facility] PCP Alicia Cheng DO              HPI:   Lashonda Bullock is a(n) 69 year old female who presents at the request of Alicia Cheng DO for evaluation of vertigo and abnormal MRI brain. She has a history of stroke and vertigo 10 weeks post partum at age 29, had pre-eclampsia as well at that time. Also had a subclavian bypass at that age of 41, found through having low blood pressures on the LUE. Has been on ASA since then. Episodes of dizziness started in July (though has had in the past), lasted 10 minutes, has had shorter ones lasting a few seconds. It is triggered by turning her head and bending down. In the past had vertigo with \"fluid behind her ear\". Was diagnosed with complicated migraine. With headaches, gets photophobia, aura of floaters, and has to lay down. Having vertigo 3-4 times a week. Occurs with a movements, bending over or shifting. Occasionally without position changes. Usually lasts minutes. At times is followed by a migraine headache. Is taking ES tylenol about twice a week. Can have more than one in a day. Has had 3 vestibular sessions with PT. Headaches in the last 6 weeks are twice a week. Had seizures during her second high risk pregnancy, when she had pre-eclampsia. Was started on phenobarbital, had GTC seizures, last seizure was 3 years after her delivery, last seizure was at age 32. Denies any spells of LOC, staring, loss of awareness. Symptoms from the stroke were left sided numbness and weakness. She also had diplopia. Has sleep maintenance insomnia, last 1-2 years.        Meds tried  Gabapentin ankle swelling  Elavil did not help sleep, tolerated it, did not help migraines     Pertinent imaging and laboratory work-up:   MRI brain 2024- personally reviewed, right anterior  temporal small cyst, no mass effect, with small amount of gliosis, and T2 flair change in the central midbrain    IMPRESSION:   1.Normal appearing 7th and 8th cranial nerve complexes. No acoustic neuroma. No CP angle mass.   2.No acute intracranial process.   3.5 mm T2 hyperintense structure in the midbrain is nonspecific but is stable dating back to May 19, 2016.   4.Simple appearing subcentimeter cyst in the anterior aspect of the right temporal lobe has increased in size compared to prior and there is a new focus of adjacent gliosis. The finding does not have a worrisome appearance.   5.A few T2/flair hyperintensities in the supratentorial white matter are nonspecific but are commonly attributed to chronic small vessel ischemic disease in a patient this age. These have progressed compared to prior.       Past Medical History:  Past Medical History:    Abdominal distention    Abdominal pain    Acute, but ill-defined, cerebrovascular disease    Anxiety    Arthritis    Basal cell carcinoma of skin    Bleeding nose    Bloating    Blood in urine    Calculus of kidney    Constipation    Depressive disorder, not elsewhere classified    Diverticulitis    Easy bruising    Endometriosis    Endometriosis, site unspecified    Fatigue    Frequent urination    Frequent UTI    Headache disorder    Heartburn    Hemorrhoids    Indigestion    Insomnia, unspecified    Irregular bowel habits    Leaking of urine    Leg swelling    Neuralgia    Night sweats    Osteoporosis    Pain with bowel movements    Skin blushing/flushing    Sleep disturbance    Stool incontinence    Stress    Thyroid disease    Wears glasses    Weight gain        Past Surgical History:  Past Surgical History:   Procedure Laterality Date    Appendectomy      Appendectomy      Breast surgery procedure unlisted      lumpectomy    Cholecystectomy      Colonoscopy      Egd      Hysterectomy  1991    total hystero.    [facility] localization wire 1 site left (cpt=19281)   1999    ADH    [facility] localization wire 1 site right (cpt=19281) Right 1990    benign.    [facility] localization wire 2 site right (cpt=19281/96176) Right 1993    benign.    Needle biopsy right  2012    US guided biopsy-benign    Oophorectomy Bilateral 1989    total hystero.    Other  1/1/00'    bronchoscopy w excision of tumor    Other      thoracotomy    Other surgical history      gallbladder    Other surgical history      thyroidectomy    Sigmoidoscopy,diagnostic      Thyroidectomy      Tonsillectomy      adenoidectomy    Total abdom hysterectomy         Family History:  family history includes Breast Cancer (age of onset: 69) in her maternal grandmother; CHF in her maternal grandfather; Cancer in her maternal grandmother, paternal grandfather, and paternal grandmother; Colon Polyps in her father and mother; Hypertension in her father; Ovarian Cancer (age of onset: 69) in her mother; acute MI in her father; fallopian tube cancer in her mother.      Social History:   reports that she quit smoking about 7 years ago. Her smoking use included cigarettes. She started smoking about 27 years ago. She has a 5 pack-year smoking history. She has never used smokeless tobacco. She reports that she does not drink alcohol and does not use drugs.    Allergies:  Allergies[1]    MEDICATIONS:    Current Outpatient Medications:     topiramate 25 MG Oral Tab, Day 1-5: 1 tab nightly, Week 2: take 2 tabs nightly, Week 3: take 3 tabs nightly, Week 4: if tolerating, and if headaches not improved, increase to 4 tabs nightly, Disp: 90 tablet, Rfl: 1    PANTOPRAZOLE 40 MG Oral Tab EC, TAKE 1 TABLET BY MOUTH EVERY DAY 30 MINUTES BEFORE BREAKFAST, Disp: 90 tablet, Rfl: 0    meclizine 25 MG Oral Tab, Take 0.5 tablets (12.5 mg total) by mouth 3 (three) times daily as needed., Disp: 90 tablet, Rfl: 2    lactulose 10 GM/15ML Oral Solution, TAKE 30 ML BY MOUTH THREE TIMES DAILY, Disp: 2700 mL, Rfl: 0    SIMVASTATIN 20 MG Oral Tab, TAKE 1 TABLET BY MOUTH  IN THE EVENING, Disp: 90 tablet, Rfl: 0    hydroxychloroquine (PLAQUENIL) 200 MG Oral Tab, Take 1 tablet (200 mg total) by mouth 2 (two) times daily., Disp: 180 tablet, Rfl: 3    LEVOTHYROXINE 75 MCG Oral Tab, TAKE 1 TABLET BY MOUTH IN THE MORNING BEFORE BREAKFAST, Disp: 90 tablet, Rfl: 0    fluticasone propionate 50 MCG/ACT Nasal Suspension, 2 sprays by Nasal route nightly., Disp: 3 each, Rfl: 0    Mirabegron ER (MYRBETRIQ) 50 MG Oral Tablet 24 Hr, Take 1 tablet (50 mg total) by mouth daily., Disp: 90 tablet, Rfl: 3    metFORMIN 500 MG Oral Tab, Take 1 tablet (500 mg total) by mouth 2 (two) times daily with meals., Disp: 180 tablet, Rfl: 1    metoprolol tartrate 25 MG Oral Tab, Take 2 tablets (50 mg total) by mouth daily., Disp: 180 tablet, Rfl: 1    acidophilus-pectin Oral Cap, Take 1 capsule by mouth daily., Disp: , Rfl:     Cyclobenzaprine HCl 5 MG Oral Tab, Take 1 tablet (5 mg total) by mouth 3 (three) times daily as needed for Muscle spasms., Disp: 30 tablet, Rfl: 0    ondansetron 4 MG Oral Tablet Dispersible, Take 1 tablet (4 mg total) by mouth every 8 (eight) hours as needed for Nausea., Disp: 10 tablet, Rfl: 0    EPINEPHrine (EPIPEN) 0.3 MG/0.3ML Injection Solution Auto-injector, Inject into skin as needed for severe allergic reaction, Disp: 1 each, Rfl: 0    aspirin 81 MG Oral Tab, Take 1 tablet (81 mg total) by mouth daily., Disp: , Rfl:     Cholecalciferol (VITAMIN D-3 OR), Take 1,000 Units by mouth daily.  , Disp: , Rfl:     VITAMIN E, Take 400 mg by mouth daily.  , Disp: , Rfl:     Cyanocobalamin (VITAMIN B-12 CR OR), Take  by mouth., Disp: , Rfl:     Calcium Carbonate (CALTRATE 600 OR), Take  by mouth., Disp: , Rfl:       Review of Systems:   A comprehensive 10 point review of systems was completed.     Pertinent positives and negatives noted in the HPI.         PHYSICAL EXAM:   Neurological Exam  Vitals  Vitals:    12/23/24 0928   BP: 140/80   Pulse: 64   Resp: 16     General Appearance: Patient is a  69 year old female in no acute distress  Cardiac: Normal rate & regular rhythm  Skin: There are no rashes or other skin lesions.  Musculoskeletal: There is no scoliosis, or joint deformities  Neurologic examination:  Mental status: Patient is alert, attentive, and oriented x 3. Language is coherent and fluent without aphasia. Memory, comprehension and ability to follow commands were intact.   Cranial nerves II-XII: Optic discs were sharp. Pupils were round and reacted to light. Extraocular movements were full. There was no face, jaw, palate or tongue weakness or atrophy. Facial sensation was normal. Hearing was grossly intact. Shoulder shrug was normal.   Motor exam revealed normal muscle bulk and tone. No atrophy or fasciculations. Manual muscle testing revealed MRC grade 5/5 strength throughout including proximal and distal muscles of the arms and legs.  Deep tendon reflexes were 2 at the biceps, brachioradialis, triceps, knee jerk, and ankle jerk. Plantar responses were flexor bilaterally.   Sensory exam revealed normal light touch perception. Vibratory perception and proprioception were intact at the toes. Pinprick and temperature were normal. Romberg sign was absent.  Complex motor skills revealed normal coordination. Finger-nose-finger intact.   Gait was narrow and stable, was able to walk on heels, toes and tandem without any difficulty.     ASSESSMENT/ACTIVE PROBLEM LIST:     Encounter Diagnoses   Name Primary?    Abnormal MRI of head Yes    Brainstem stroke (HCC)     Cyst of brain     Migraine with aura and without status migrainosus, not intractable     Insomnia, unspecified type     Adjustment disorder, unspecified type        Discussion/Plan:  Abnormal MRI- right temporal cyst with slight increased size, with mild gliosis, appears different from arachnoid cyst, based on non-distal location  Recommend NSGY evaluation    -Headaches consistent with migraines  -Discussed probable triggers in her which are  stress  -Instructed to keep a headache diary  -Discussed avoiding migraine triggers and to eat regular meals, practice good sleep hygiene  -Start topamax  -Discussed a few possible common side effects, including drowsiness, slowed thinking  -Ibuprofen or exedrin migraine as abortive  -Discussed medication overuse headache and to avoid by taking abortives not more than 2-3 times per week  -Take rescue medication immediately at onset of headache, and use most effective rescue medication (not a combination or alternating days)    Adjustment disorder -   Stressors parent with dementia, family dynamics  [facility] navigator referral given    Chronic midline/right midbrain stroke  At age 29, with diplopia and left sided numbness and weakness  Continue ASA 81mg and simvastatin  Monitor BP    Requested Prescriptions     Signed Prescriptions Disp Refills    topiramate 25 MG Oral Tab 90 tablet 1     Sig: Day 1-5: 1 tab nightly, Week 2: take 2 tabs nightly, Week 3: take 3 tabs nightly, Week 4: if tolerating, and if headaches not improved, increase to 4 tabs nightly          We discussed in depth regarding the diagnosis, prognosis, treatment. The patient was given ample opportunity to ask questions. All questions and concerns were addressed.     Tarah Little,   Neuromuscular and General Neurology  [facility]             [1]   Allergies  Allergen Reactions    Adhesive Tape HIVES    Paxlovid [Nirmatrelvir-Ritonavir] RASH and SWELLING    Radiology Contrast Iodinated Dyes Tightness in Throat    Bee      Sting      Iodine (Topical)      SOLN      Macrobid [Nitrofurantoin] OTHER (SEE COMMENTS)     SEVERE VOMITING AND DIARRHEA    Trees, [place]      [place] trees    Wellbutrin [Bupropion Hcl]

## 2024-12-26 ENCOUNTER — TELEPHONE (OUTPATIENT)
Dept: NEUROLOGY | Facility: CLINIC | Age: 69
End: 2024-12-26

## 2024-12-26 ENCOUNTER — TELEPHONE (OUTPATIENT)
Age: 69
End: 2024-12-26

## 2024-12-26 NOTE — TELEPHONE ENCOUNTER
Received colonoscopy clearance request from [facility], placed in nurses bin for review.

## 2025-01-01 DIAGNOSIS — E78.00 ELEVATED CHOLESTEROL: ICD-10-CM

## 2025-01-01 DIAGNOSIS — E03.9 HYPOTHYROIDISM, UNSPECIFIED TYPE: ICD-10-CM

## 2025-01-02 RX ORDER — LEVOTHYROXINE SODIUM 75 UG/1
75 TABLET ORAL
Qty: 90 TABLET | Refills: 0 | Status: SHIPPED | OUTPATIENT
Start: 2025-01-02

## 2025-01-02 RX ORDER — SIMVASTATIN 20 MG
20 TABLET ORAL EVERY EVENING
Qty: 90 TABLET | Refills: 0 | Status: SHIPPED | OUTPATIENT
Start: 2025-01-02

## 2025-01-06 ENCOUNTER — TELEPHONE (OUTPATIENT)
Age: 70
End: 2025-01-06

## 2025-01-20 ENCOUNTER — OFFICE VISIT (OUTPATIENT)
Dept: FAMILY MEDICINE CLINIC | Facility: CLINIC | Age: 70
End: 2025-01-20
Payer: MEDICARE

## 2025-01-20 VITALS
HEART RATE: 60 BPM | OXYGEN SATURATION: 97 % | HEIGHT: 65 IN | SYSTOLIC BLOOD PRESSURE: 128 MMHG | DIASTOLIC BLOOD PRESSURE: 80 MMHG | WEIGHT: 149 LBS | BODY MASS INDEX: 24.83 KG/M2 | RESPIRATION RATE: 16 BRPM

## 2025-01-20 DIAGNOSIS — R42 VERTIGO: ICD-10-CM

## 2025-01-20 DIAGNOSIS — F43.9 STRESS: ICD-10-CM

## 2025-01-20 DIAGNOSIS — E78.00 ELEVATED CHOLESTEROL: ICD-10-CM

## 2025-01-20 DIAGNOSIS — E03.9 HYPOTHYROIDISM, UNSPECIFIED TYPE: Primary | ICD-10-CM

## 2025-01-20 DIAGNOSIS — I10 ESSENTIAL HYPERTENSION: ICD-10-CM

## 2025-01-20 PROCEDURE — 99214 OFFICE O/P EST MOD 30 MIN: CPT | Performed by: FAMILY MEDICINE

## 2025-01-20 RX ORDER — LACTULOSE 10 G/15ML
SOLUTION ORAL
Qty: 2700 ML | Refills: 0 | Status: SHIPPED | OUTPATIENT
Start: 2025-01-20

## 2025-01-20 NOTE — PROGRESS NOTES
HPI:   Lashonda Bullock is a 69 year old female who presents for follow up on MMP     S/p gi eval   Chronic constipation   Now ferritin   Will have endoscopy soon   Discussed new meds     Dizziness has resolved for the most pain   Pt will have occasional episodes     Normal blood pressure     Pt a cva and vertigo when she was 10 weeks PP / h/o preeclampsia   Age 41 subclavian bypass   Quit smoking   Patient denies chest pain, shortness of breath  No exertional symptoms.    Still under tremendous stress caring for Dad   Frequent disruptions in the middle of the night     Seeing neuro for migraines / vertigo   Neuro wants to start topamax   Pt has 3 migraines per week   Pt is consistently not sleeping       Current Outpatient Medications   Medication Sig Dispense Refill    SIMVASTATIN 20 MG Oral Tab TAKE 1 TABLET BY MOUTH IN THE EVENING (Patient not taking: Reported on 1/20/2025) 90 tablet 0    LEVOTHYROXINE 75 MCG Oral Tab TAKE 1 TABLET BY MOUTH EVERY DAY IN THE MORNING BEFORE breakfast 90 tablet 0    PEG 3350-KCl-Na Bicarb-NaCl 420 g Oral Recon Soln Take as directed by your doctor 4000 mL 0    Tenapanor HCl (IBSRELA) 50 MG Oral Tab Take 50 mg by mouth in the morning and 50 mg before bedtime. Take immediately prior to breakfast or the first meal of the day and immediately prior to dinner.. 60 tablet 3    pantoprazole 40 MG Oral Tab EC Take one tablet (40 mg total) by mouth twice daily, 30 minutes prior to breakfast and 30 minutes before dinner. 180 tablet 3    topiramate 25 MG Oral Tab Day 1-5: 1 tab nightly, Week 2: take 2 tabs nightly, Week 3: take 3 tabs nightly, Week 4: if tolerating, and if headaches not improved, increase to 4 tabs nightly 90 tablet 1    PANTOPRAZOLE 40 MG Oral Tab EC TAKE 1 TABLET BY MOUTH EVERY DAY 30 MINUTES BEFORE BREAKFAST 90 tablet 0    meclizine 25 MG Oral Tab Take 0.5 tablets (12.5 mg total) by mouth 3 (three) times daily as needed. 90 tablet 2    lactulose 10 GM/15ML Oral Solution TAKE  30 ML BY MOUTH THREE TIMES DAILY 2700 mL 0    hydroxychloroquine (PLAQUENIL) 200 MG Oral Tab Take 1 tablet (200 mg total) by mouth 2 (two) times daily. 180 tablet 3    fluticasone propionate 50 MCG/ACT Nasal Suspension 2 sprays by Nasal route nightly. 3 each 0    Mirabegron ER (MYRBETRIQ) 50 MG Oral Tablet 24 Hr Take 1 tablet (50 mg total) by mouth daily. 90 tablet 3    metFORMIN 500 MG Oral Tab Take 1 tablet (500 mg total) by mouth 2 (two) times daily with meals. 180 tablet 1    metoprolol tartrate 25 MG Oral Tab Take 2 tablets (50 mg total) by mouth daily. 180 tablet 1    acidophilus-pectin Oral Cap Take 1 capsule by mouth daily.      Cyclobenzaprine HCl 5 MG Oral Tab Take 1 tablet (5 mg total) by mouth 3 (three) times daily as needed for Muscle spasms. 30 tablet 0    ondansetron 4 MG Oral Tablet Dispersible Take 1 tablet (4 mg total) by mouth every 8 (eight) hours as needed for Nausea. 10 tablet 0    EPINEPHrine (EPIPEN) 0.3 MG/0.3ML Injection Solution Auto-injector Inject into skin as needed for severe allergic reaction 1 each 0    aspirin 81 MG Oral Tab Take 1 tablet (81 mg total) by mouth daily.      Cholecalciferol (VITAMIN D-3 OR) Take 1,000 Units by mouth daily.        VITAMIN E Take 400 mg by mouth daily.        Cyanocobalamin (VITAMIN B-12 CR OR) Take  by mouth.      Calcium Carbonate (CALTRATE 600 OR) Take  by mouth.        Past Medical History:    Abdominal distention    Abdominal pain    Acute, but ill-defined, cerebrovascular disease    Anxiety    Arthritis    Basal cell carcinoma of skin    Bleeding nose    Bloating    Blood in urine    Calculus of kidney    Constipation    Depressive disorder, not elsewhere classified    Diverticulitis    Dizziness    Easy bruising    Endometriosis    Endometriosis, site unspecified    Fatigue    Frequent urination    Frequent UTI    Headache disorder    Heartburn    Hemorrhoids    Indigestion    Insomnia, unspecified    Irregular bowel habits    Leaking of urine     Leg swelling    Neuralgia    Night sweats    Osteoporosis    Pain with bowel movements    Skin blushing/flushing    Sleep disturbance    Stool incontinence    Stress    Thyroid disease    Wears glasses    Weight gain      Past Surgical History:   Procedure Laterality Date    Appendectomy      Appendectomy      Breast surgery procedure unlisted      lumpectomy    Cholecystectomy      Colonoscopy      Egd      Hysterectomy      total hystero.    [facility] localization wire 1 site left (cpt=19281)      ADH    [facility] localization wire 1 site right (cpt=19281) Right     benign.    [facility] localization wire 2 site right (cpt=19281/10058) Right     benign.    Needle biopsy right      US guided biopsy-benign    Oophorectomy Bilateral     total hystero.    Other  00'    bronchoscopy w excision of tumor    Other      thoracotomy    Other surgical history      gallbladder    Other surgical history      thyroidectomy    Sigmoidoscopy,diagnostic      Thyroidectomy      Tonsillectomy      adenoidectomy    Total abdom hysterectomy        Family History   Problem Relation Age of Onset    Cancer Maternal Grandmother     Breast Cancer Maternal Grandmother 69    Cancer Paternal Grandfather     Cancer Paternal Grandmother     Hypertension Father     Colon Polyps Father     Other (acute MI) Father     Other (CHF) Maternal Grandfather     Ovarian Cancer Mother 69    Colon Polyps Mother     Other (fallopian tube cancer) Mother       Social History:   Social History     Socioeconomic History    Marital status:    Tobacco Use    Smoking status: Former     Current packs/day: 0.00     Average packs/day: 0.3 packs/day for 20.0 years (5.0 ttl pk-yrs)     Types: Cigarettes     Start date: 1997     Quit date: 2017     Years since quittin.6    Smokeless tobacco: Never   Vaping Use    Vaping status: Never Used   Substance and Sexual Activity    Alcohol use: No     Alcohol/week: 0.0 standard drinks of alcohol      Comment: socially    Drug use: No   Other Topics Concern    Caffeine Concern Yes     Comment: 1-2 cups daily    Exercise Yes     Comment: active     Occ: . : 2. 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  Diet: watches minimally     REVIEW OF SYSTEMS:   GENERAL: feels well otherwise  SKIN: denies any unusual skin lesions  EYES:denies blurred vision or double vision  HEENT: denies nasal congestion, sinus pain or ST  LUNGS: denies shortness of breath with exertion  CARDIOVASCULAR: denies chest pain on exertion  GI: denies abdominal pain,denies heartburn  MUSCULOSKELETAL: denies back pain  NEURO: denies headaches  PSYCHE: denies depression or anxiety  HEMATOLOGIC: denies hx of anemia  ALL/ASTHMA: denies asthma    EXAM:   /80   Pulse 60   Resp 16   Ht 5' 5\" (1.651 m)   Wt 149 lb (67.6 kg)   SpO2 97%   BMI 24.79 kg/m²   Body mass index is 24.79 kg/m².   GENERAL: alert and oriented X 3, well developed, well nourished,in no apparent distress  NEURO: cranial nerves are intact,motor and sensory are grossly intact  HEART: normal   LUNGS: clear   NECK: supple no LAD, no JVD, no carotid bruits  LE: no edema     ASSESSMENT AND PLAN:   Lashonda Bullock is a 69 year old female who presents with     1. Hypothyroidism, unspecified type  Cpm     2. Vertigo  Monitor     3. Essential hypertension  Cpm     4. Elevated cholesterol  Cpm     5. Stress  Monitor / book on dementia     Questions answered and patient indicates understanding of these issues and agrees to the plan.  Follow up in 3 mo or sooner if needed.

## 2025-02-10 NOTE — TELEPHONE ENCOUNTER
Rx Request  Levothyroxine Sodium 88 MCG Oral Tab    Disp:    90                R: 1      Associated Dx: Hypothyroidism, unspecified type    Last Visit: 01/08/2019    Last Refilled: 06/04/18
Patient requests all Lab, Cardiology, and Radiology Results on their Discharge Instructions

## 2025-03-24 PROBLEM — Z85.820 HISTORY OF MELANOMA: Status: ACTIVE | Noted: 2025-03-24

## 2025-03-24 PROBLEM — C43.59 MALIGNANT MELANOMA OF SKIN OF CHEST (HCC): Status: RESOLVED | Noted: 2024-03-05 | Resolved: 2025-03-24

## 2025-03-31 ENCOUNTER — OFFICE VISIT (OUTPATIENT)
Dept: FAMILY MEDICINE CLINIC | Facility: CLINIC | Age: 70
End: 2025-03-31
Payer: MEDICARE

## 2025-03-31 VITALS
BODY MASS INDEX: 25.46 KG/M2 | HEIGHT: 65 IN | HEART RATE: 74 BPM | DIASTOLIC BLOOD PRESSURE: 78 MMHG | OXYGEN SATURATION: 100 % | WEIGHT: 152.81 LBS | SYSTOLIC BLOOD PRESSURE: 120 MMHG | RESPIRATION RATE: 16 BRPM

## 2025-03-31 DIAGNOSIS — E78.00 ELEVATED CHOLESTEROL: ICD-10-CM

## 2025-03-31 DIAGNOSIS — E04.1 NONTOXIC UNINODULAR GOITER: ICD-10-CM

## 2025-03-31 DIAGNOSIS — Z11.59 NEED FOR HEPATITIS C SCREENING TEST: ICD-10-CM

## 2025-03-31 DIAGNOSIS — E78.00 PURE HYPERCHOLESTEROLEMIA: ICD-10-CM

## 2025-03-31 DIAGNOSIS — Z85.820 HISTORY OF MELANOMA: ICD-10-CM

## 2025-03-31 DIAGNOSIS — I10 ESSENTIAL HYPERTENSION: ICD-10-CM

## 2025-03-31 DIAGNOSIS — Z86.73 HISTORY OF STROKE: ICD-10-CM

## 2025-03-31 DIAGNOSIS — Z12.11 COLON CANCER SCREENING: ICD-10-CM

## 2025-03-31 DIAGNOSIS — E03.9 HYPOTHYROIDISM, UNSPECIFIED TYPE: ICD-10-CM

## 2025-03-31 DIAGNOSIS — Z12.31 ENCOUNTER FOR SCREENING MAMMOGRAM FOR HIGH-RISK PATIENT: ICD-10-CM

## 2025-03-31 DIAGNOSIS — M35.9 UNDIFFERENTIATED CONNECTIVE TISSUE DISEASE (HCC): ICD-10-CM

## 2025-03-31 DIAGNOSIS — Z86.0101 HISTORY OF ADENOMATOUS POLYP OF COLON: ICD-10-CM

## 2025-03-31 DIAGNOSIS — N32.81 OVERACTIVE BLADDER: ICD-10-CM

## 2025-03-31 DIAGNOSIS — Z12.2 SCREENING FOR LUNG CANCER: ICD-10-CM

## 2025-03-31 DIAGNOSIS — R42 VERTIGO: ICD-10-CM

## 2025-03-31 DIAGNOSIS — H04.129 DRY EYE: ICD-10-CM

## 2025-03-31 DIAGNOSIS — K58.1 IRRITABLE BOWEL SYNDROME WITH CONSTIPATION: ICD-10-CM

## 2025-03-31 DIAGNOSIS — D64.9 ANEMIA, UNSPECIFIED TYPE: ICD-10-CM

## 2025-03-31 DIAGNOSIS — T82.858S: ICD-10-CM

## 2025-03-31 DIAGNOSIS — K21.00 GASTROESOPHAGEAL REFLUX DISEASE WITH ESOPHAGITIS WITHOUT HEMORRHAGE: ICD-10-CM

## 2025-03-31 DIAGNOSIS — M79.2 NEURALGIA: ICD-10-CM

## 2025-03-31 DIAGNOSIS — Z00.00 ENCOUNTER FOR ANNUAL HEALTH EXAMINATION: Primary | ICD-10-CM

## 2025-03-31 DIAGNOSIS — Z98.890: ICD-10-CM

## 2025-03-31 DIAGNOSIS — G25.81 RESTLESS LEG SYNDROME: ICD-10-CM

## 2025-03-31 DIAGNOSIS — R73.9 HYPERGLYCEMIA: ICD-10-CM

## 2025-03-31 DIAGNOSIS — T78.40XA ALLERGIC REACTION, INITIAL ENCOUNTER: ICD-10-CM

## 2025-03-31 DIAGNOSIS — Z87.891 PERSONAL HISTORY OF TOBACCO USE, PRESENTING HAZARDS TO HEALTH: ICD-10-CM

## 2025-03-31 PROBLEM — M85.852 OSTEOPENIA OF LEFT HIP: Status: RESOLVED | Noted: 2022-04-27 | Resolved: 2025-03-31

## 2025-03-31 PROBLEM — D12.2 BENIGN NEOPLASM OF ASCENDING COLON: Status: RESOLVED | Noted: 2022-05-03 | Resolved: 2025-03-31

## 2025-03-31 PROBLEM — K59.01 SLOW TRANSIT CONSTIPATION: Status: RESOLVED | Noted: 2023-06-21 | Resolved: 2025-03-31

## 2025-03-31 PROBLEM — D12.0 BENIGN NEOPLASM OF CECUM: Status: RESOLVED | Noted: 2022-05-03 | Resolved: 2025-03-31

## 2025-03-31 PROBLEM — R91.8 ABNORMAL CT SCAN, LUNG: Status: RESOLVED | Noted: 2017-08-27 | Resolved: 2025-03-31

## 2025-03-31 PROBLEM — R68.2 DRY MOUTH: Status: RESOLVED | Noted: 2018-05-02 | Resolved: 2025-03-31

## 2025-03-31 PROBLEM — R11.12 PROJECTILE VOMITING: Status: RESOLVED | Noted: 2023-03-13 | Resolved: 2025-03-31

## 2025-03-31 RX ORDER — EPINEPHRINE 0.3 MG/.3ML
INJECTION SUBCUTANEOUS
Qty: 1 EACH | Refills: 0 | Status: SHIPPED | OUTPATIENT
Start: 2025-03-31

## 2025-03-31 RX ORDER — SIMVASTATIN 20 MG
20 TABLET ORAL EVERY EVENING
Qty: 90 TABLET | Refills: 1 | Status: SHIPPED | OUTPATIENT
Start: 2025-03-31

## 2025-03-31 RX ORDER — METOPROLOL TARTRATE 25 MG/1
50 TABLET, FILM COATED ORAL DAILY
Qty: 180 TABLET | Refills: 1 | Status: SHIPPED | OUTPATIENT
Start: 2025-03-31

## 2025-03-31 RX ORDER — LEVOTHYROXINE SODIUM 75 UG/1
75 TABLET ORAL
Qty: 90 TABLET | Refills: 1 | Status: SHIPPED | OUTPATIENT
Start: 2025-03-31

## 2025-03-31 NOTE — PROGRESS NOTES
Subjective:   Lashonda Bullock is a 70 year old female who presents for a Medicare Initial Preventative Physical Exam (Welcome to Medicare- < 12 months on Medicare) and scheduled follow up of multiple significant but stable problems.     History of Present Illness  Lashonda Bullock is a 70 year old female who presents for her first Medicare physical exam.     She is waiting for her prescription coverage to be active before scheduling the procedures.    She has a history of melanoma, with surgeries on her foot and neck, the most recent being on March 24. Her family history includes various cancers, but she is the first in her family to have melanoma.    She experiences weak bladder control and has not been taking her prescribed medication since January due to the high cost without prescription coverage. She is hopeful that her coverage will be active soon. She also experiences restless legs, which may be related to low iron levels. She has a history of projectile vomiting but is not currently experiencing it.    She quit smoking eight years ago and has not had a lung CT scan. She is currently walking for exercise and is looking forward to gardening as the weather warms.    She is managing her father's care, who is experiencing rapid cognitive decline, and is working on setting better boundaries with him.        Pt also here with    Nontoxic uninodular goiter    Hypothyroidism    Pure hypercholesterolemia    History of exploratory thoracotomy    Neuralgia    Restless leg syndrome    Undifferentiated connective tissue disease (HCC)    Dry eye    History of adenomatous polyp of colon    GERD (gastroesophageal reflux disease)    Irritable bowel syndrome with constipation    History of stroke, age 29    Overactive bladder    Hyperglycemia    History of melanoma       History/Other:   Fall Risk Assessment:   She has been screened for Falls and is High Risk. Fall Prevention information provided to patient in After Visit  Summary.    Do you feel unsteady when standing or walking?: No  Do you worry about falling?: No  Have you fallen in the past year?: Yes  How many times have you fallen?: 1  Were you injured?: Yes     Cognitive Assessment:   She had a completely normal cognitive assessment - see flowsheet entries     Functional Ability/Status:   Lashonda Bullock has some abnormal functions as listed below:  She has difficulties Affording Meds based on screening of functional status.       Depression Screening (PHQ):  PHQ-2 SCORE: 0  , done 3/31/2025   Trouble falling or staying asleep, or sleeping too much: 3     Feeling tired or having little energy: 1    Poor appetite or overeatin    Feeling bad about yourself - or that you are a failure or have let yourself or your family down: 3    Trouble concentrating on things, such as reading the newspaper or watching television: 1    Moving or speaking so slowly that other people could have noticed. Or the opposite - being so fidgety or restless that you have been moving around a lot more than usual: 3    If you checked off any problems, how difficult have these problems made it for you to do your work, take care of things at home, or get along with other people?: Somewhat difficult          Advanced Directives:   She does have a Living Will but we do NOT have it on file in Epic.    She does have a POA but we do NOT have it on file in Epic.    Discussed Advance Care Planning with patient (and family/surrogate if present). Standard forms made available to patient in After Visit Summary.      Patient Active Problem List   Diagnosis    Nontoxic uninodular goiter-stable     Hypothyroidism- stable     Pure hypercholesterolemia- stable     History of exploratory thoracotomy- stable     Neuralgia- stable     Restless leg syndrome- stable     Undifferentiated connective tissue disease (HCC)- stable     Dry eye- stable     History of adenomatous polyp of colon- stable     GERD (gastroesophageal  reflux disease)- stable     Irritable bowel syndrome with constipation- stable     History of stroke, age 29- stable     Overactive bladder- stable     Hyperglycemia- stable     History of melanoma- stable      Allergies:  She is allergic to adhesive tape; paxlovid [nirmatrelvir-ritonavir]; radiology contrast iodinated dyes; bee; iodine (topical); macrobid [nitrofurantoin]; trees, box elder; and wellbutrin [bupropion hcl].    Current Medications:  Outpatient Medications Marked as Taking for the 3/31/25 encounter (Office Visit) with Alicia Cheng, DO   Medication Sig    EPINEPHrine (EPIPEN) 0.3 MG/0.3ML Injection Solution Auto-injector Inject into skin as needed for severe allergic reaction    levothyroxine 75 MCG Oral Tab Take 1 tablet (75 mcg total) by mouth before breakfast.    metFORMIN 500 MG Oral Tab Take 1 tablet (500 mg total) by mouth 2 (two) times daily with meals.    metoprolol tartrate 25 MG Oral Tab Take 2 tablets (50 mg total) by mouth daily.    simvastatin 20 MG Oral Tab Take 1 tablet (20 mg total) by mouth every evening.    lactulose 10 GM/15ML Oral Solution TAKE 30 ML BY MOUTH THREE TIMES DAILY    Tenapanor HCl (IBSRELA) 50 MG Oral Tab Take 50 mg by mouth in the morning and 50 mg before bedtime. Take immediately prior to breakfast or the first meal of the day and immediately prior to dinner..    pantoprazole 40 MG Oral Tab EC Take one tablet (40 mg total) by mouth twice daily, 30 minutes prior to breakfast and 30 minutes before dinner.    topiramate 25 MG Oral Tab Day 1-5: 1 tab nightly, Week 2: take 2 tabs nightly, Week 3: take 3 tabs nightly, Week 4: if tolerating, and if headaches not improved, increase to 4 tabs nightly    PANTOPRAZOLE 40 MG Oral Tab EC TAKE 1 TABLET BY MOUTH EVERY DAY 30 MINUTES BEFORE BREAKFAST    meclizine 25 MG Oral Tab Take 0.5 tablets (12.5 mg total) by mouth 3 (three) times daily as needed.    hydroxychloroquine (PLAQUENIL) 200 MG Oral Tab Take 1 tablet (200 mg total) by  mouth 2 (two) times daily.    fluticasone propionate 50 MCG/ACT Nasal Suspension 2 sprays by Nasal route nightly.    Mirabegron ER (MYRBETRIQ) 50 MG Oral Tablet 24 Hr Take 1 tablet (50 mg total) by mouth daily.    acidophilus-pectin Oral Cap Take 1 capsule by mouth daily.    Cyclobenzaprine HCl 5 MG Oral Tab Take 1 tablet (5 mg total) by mouth 3 (three) times daily as needed for Muscle spasms.    ondansetron 4 MG Oral Tablet Dispersible Take 1 tablet (4 mg total) by mouth every 8 (eight) hours as needed for Nausea.    aspirin 81 MG Oral Tab Take 1 tablet (81 mg total) by mouth daily.    Cholecalciferol (VITAMIN D-3 OR) Take 1,000 Units by mouth daily.      VITAMIN E Take 400 mg by mouth daily.      Cyanocobalamin (VITAMIN B-12 CR OR) Take  by mouth.    Calcium Carbonate (CALTRATE 600 OR) Take  by mouth.       Medical History:  She  has a past medical history of Abdominal distention, Abdominal pain, Acute, but ill-defined, cerebrovascular disease, Anxiety, Arthritis, Basal cell carcinoma of skin, Bleeding nose, Bloating, Blood in urine, Calculus of kidney, Constipation, Depressive disorder, not elsewhere classified, Diverticulitis, Dizziness, Easy bruising, Endometriosis, Endometriosis, site unspecified, Fatigue, Frequent urination, Frequent UTI, Headache disorder, Heartburn, Hemorrhoids, Indigestion, Insomnia, unspecified, Irregular bowel habits, Leaking of urine, Leg swelling, Neuralgia (08/2015), Night sweats, Osteoporosis, Pain with bowel movements, Skin blushing/flushing, Sleep disturbance, Stool incontinence, Stress, Thyroid disease, Wears glasses, and Weight gain.  Surgical History:  She  has a past surgical history that includes appendectomy; breast surgery procedure unlisted; other (1/1/00'); other surgical history; other; other surgical history; tonsillectomy; needle biopsy right (2012); elyse localization wire 1 site left (cpt=19281) (1999); total abdom hysterectomy; colonoscopy; oophorectomy (Bilateral,  ); hysterectomy (); elyse localization wire 1 site right (cpt=19281) (Right, ); elyse localization wire 2 site right (cpt=19281/11999) (Right, ); appendectomy; cholecystectomy; thyroidectomy; egd; and sigmoidoscopy,diagnostic.   Family History:  Her family history includes Breast Cancer (age of onset: 69) in her maternal grandmother; CHF in her maternal grandfather; Cancer in her maternal grandmother, paternal grandfather, and paternal grandmother; Colon Polyps in her father and mother; Hypertension in her father; Ovarian Cancer (age of onset: 69) in her mother; acute MI in her father; fallopian tube cancer in her mother.  Social History:  She  reports that she quit smoking about 7 years ago. Her smoking use included cigarettes. She started smoking about 27 years ago. She has a 5 pack-year smoking history. She has never used smokeless tobacco. She reports that she does not drink alcohol and does not use drugs.    Tobacco:  She smoked tobacco in the past but quit greater than 12 months ago.  Social History     Tobacco Use   Smoking Status Former    Current packs/day: 0.00    Average packs/day: 0.3 packs/day for 20.0 years (5.0 ttl pk-yrs)    Types: Cigarettes    Start date: 1997    Quit date: 2017    Years since quittin.8   Smokeless Tobacco Never          CAGE Alcohol Screen:   CAGE screening score of 0 on 3/31/2025, showing low risk of alcohol abuse.    Patient Care Team:  Alicia Cheng DO as PCP - General (Family Practice)  Olivia Giron MD as Consulting Physician (NEUROLOGY)  Erick Corea MD (RHEUMATOLOGY)  Kareem Patel MD (GASTROENTEROLOGY)  April Castro PT as Physical Therapist (Physical Therapy)  Hitesh Nair APRN (Nurse Practitioner)  Tara Muñoz as Physical Therapist    Review of Systems  GENERAL: feels well otherwise  SKIN: denies any unusual skin lesions  EYES: denies blurred vision or double vision  HEENT: denies nasal congestion, sinus pain or  ST  LUNGS: denies shortness of breath with exertion  CARDIOVASCULAR: denies chest pain on exertion  GI: denies abdominal pain, denies heartburn  : denies dysuria, vaginal discharge or itching, no complaint of urinary incontinence   MUSCULOSKELETAL: denies back pain  NEURO: denies headaches  PSYCHE: denies depression or anxiety  HEMATOLOGIC: denies hx of anemia  ENDOCRINE: thyroid history  ALL/ASTHMA: denies asthma    Objective:   Physical Exam  General Appearance:  Alert, cooperative, no distress, appears stated age   Head:  Normocephalic, without obvious abnormality, atraumatic   Eyes:  PERRL, conjunctiva/corneas clear, EOM's intact both eyes   Ears:  Normal TM's and external ear canals, both ears   Nose: Nares normal, septum midline,mucosa normal, no drainage or sinus tenderness   Throat: Lips, mucosa, and tongue normal; teeth and gums normal   Neck: Supple, symmetrical, trachea midline, no adenopathy;  thyroid: not enlarged, symmetric, no tenderness/mass/nodules; no carotid bruit or JVD   Back:   Symmetric, no curvature, ROM normal, no CVA tenderness   Lungs:   Clear to auscultation bilaterally, respirations unlabored   Heart:  Regular rate and rhythm, S1 and S2 normal, no murmur, rub, or gallop   Abdomen:   Soft, non-tender, bowel sounds active all four quadrants,  no masses, no organomegaly   Pelvic: Deferred   Extremities: Extremities normal, atraumatic, no cyanosis or edema   Pulses: 2+ and symmetric   Skin: Skin color, texture, turgor normal, no rashes or lesions   Lymph nodes: Cervical, supraclavicular, and axillary nodes normal   Neurologic: Normal       /78   Pulse 74   Resp 16   Ht 5' 5\" (1.651 m)   Wt 152 lb 12.8 oz (69.3 kg)   SpO2 100%   BMI 25.43 kg/m²  Estimated body mass index is 25.43 kg/m² as calculated from the following:    Height as of this encounter: 5' 5\" (1.651 m).    Weight as of this encounter: 152 lb 12.8 oz (69.3 kg).    Medicare Hearing Assessment:   Hearing Screening     Screening Method: Whisper Test  Whisper Test Result: Pass         Visual Acuity:   Right Eye Visual Acuity: Corrected Right Eye Chart Acuity: 20/40   Left Eye Visual Acuity: Corrected Left Eye Chart Acuity: 20/40   Both Eyes Visual Acuity: Corrected Both Eyes Chart Acuity: 20/30   Able To Tolerate Visual Acuity: Yes        Assessment & Plan:   Lashonda Bullock is a 70 year old female who presents for a Medicare Assessment.     Assessment & Plan  Melanoma  Post-operative status following melanoma excision on the neck on March 24, 2025. Previous excision was on the foot. Discussed hereditary nature and importance of family dermatological screening.  - Advise family members, including children and grandchildren, to undergo dermatological screening.  - Continue dermatology follow-up.    Anemia  Colonoscopy and endoscopy planned to investigate anemia and potential bleeding sources. Procedures to be conducted at the hospital. Advised to ensure active prescription coverage before scheduling.  - Refer for colonoscopy and endoscopy at the hospital.  - Schedule procedures for June or July to ensure availability.  - Verify active prescription coverage before scheduling.    Urinary Incontinence  Experiencing urinary leakage and weak bladder. Discontinued Mirabegron since January due to prescription coverage issues. Advised to contact urologist for samples and monitor coverage to resume medication.  - Contact urologist for Mirabegron samples.  - Monitor prescription coverage to resume medication.    Restless Legs Syndrome  Ongoing symptoms potentially related to low iron levels.    General Health Maintenance  Due for vaccinations and screenings. Discussed importance of updating vaccinations and scheduling screenings. Pneumonia vaccine well tolerated.  - Administer pneumonia vaccine.  - Order low-dose CT scan for lung cancer screening due to smoking cessation 8 years ago.  - Encourage regular exercise, such as walking.  - Discuss  potential shingles vaccine at pharmacy.            1. Encounter for annual health examination (Primary)  -     Detailed, Mod Complex (04604)  2. Encounter for screening mammogram for high-risk patient  -     [facility] 2D+3D SCREENING BILAT (CPT=77067/36940); Future; Expected date: 03/31/2025  -     Detailed, Mod Complex (51377)  4. Hypothyroidism, unspecified type- stable monitor   -     Assay, Thyroid Stim Hormone; Future; Expected date: 03/31/2025  -     Free T4, (Free Thyroxine); Future; Expected date: 03/31/2025  -     Levothyroxine Sodium; Take 1 tablet (75 mcg total) by mouth before breakfast.  Dispense: 90 tablet; Refill: 1  -     Detailed, Mod Complex (01561)  5. Vertigo- stable monitor   -     Detailed, Mod Complex (35089)  6. Essential hypertension- stable monitor   -     Comp Metabolic Panel (14); Future; Expected date: 03/31/2025  -     Metoprolol Tartrate; Take 2 tablets (50 mg total) by mouth daily.  Dispense: 180 tablet; Refill: 1  -     Detailed, Mod Complex (36987)  7. History of melanoma- see derm   -     Detailed, Mod Complex (72589)  8. Stenosis of subclavian bypass, sequela- stable monitor   -     Detailed, Mod Complex (48667)  9. Undifferentiated connective tissue disease (HCC)- see rheum   -     Detailed, Mod Complex (58705)  10. Hyperglycemia- stable cpm  -     metFORMIN HCl; Take 1 tablet (500 mg total) by mouth 2 (two) times daily with meals.  Dispense: 180 tablet; Refill: 1  -     Detailed, Mod Complex (15942)  11. Allergic reaction, initial encounter- epipen   -     EPINEPHrine; Inject into skin as needed for severe allergic reaction  Dispense: 1 each; Refill: 0  -     Detailed, Mod Complex (73328)  12. Elevated cholesterol- stable monitor   -     Simvastatin; Take 1 tablet (20 mg total) by mouth every evening.  Dispense: 90 tablet; Refill: 1  -     Detailed, Mod Complex (26321)  13. Nontoxic uninodular goiter- stable monitor   -     Detailed, Mod Complex (94053)  14. Anemia, unspecified  type- stable monitor   -     CBC With Differential With Platelet; Future; Expected date: 03/31/2025  -     Ferritin; Future; Expected date: 03/31/2025  -     Gastro Referral - In Network  -     Detailed, Mod Complex (68159)  15. Colon cancer screening  -     Gastro Referral - In Network  -     Detailed, Mod Complex (06584)  16. Pure hypercholesterolemia- stable monitor   -     Detailed, Mod Complex (74193)  17. History of exploratory thoracotomy- stable monitor   -     Detailed, Mod Complex (48204)  18. History of stroke, age 29- stable monitor   -     Detailed, Mod Complex (05541)  19. Neuralgia- stable monitor   -     Detailed, Mod Complex (40801)  20. Restless leg syndrome- stable monitor   -     Detailed, Mod Complex (78308)  21. History of adenomatous polyp of colon- stable monitor   -     Detailed, Mod Complex (96768)  22. Gastroesophageal reflux disease with esophagitis without hemorrhage- stable monitor   -     Detailed, Mod Complex (45102)  23. Irritable bowel syndrome with constipation- stable monitor   -     Detailed, Mod Complex (00148)  24. Dry eye- stable monitor   -     Detailed, Mod Complex (23772)  25. Overactive bladder- stable monitor   -     Detailed, Mod Complex (45104)  26. Screening for lung cancer- check CT  -     Detailed, Mod Complex (10221)  -     Initial CT Lung Cancer screening (CPT=71271)- Please Answer Questions, Pack years and Smoking status; Future; Expected date: 03/31/2025  -     Shared Decision Making visit for Medicare for Lung Cancer CT screening  27. Personal history of tobacco use, presenting hazards to health- check CT   -     Detailed, Mod Complex (47748)  -     Initial CT Lung Cancer screening (CPT=71271)- Please Answer Questions, Pack years and Smoking status; Future; Expected date: 03/31/2025  -     Shared Decision Making visit for Medicare for Lung Cancer CT screening  28. Need for hepatitis C screening test  -     Detailed, Mod Complex (69637)  -     HCV Antibody;  Future; Expected date: 03/31/2025  Other orders  -     Prevnar 20 (PCV20) [50662]  Assessment & Plan      The patient indicates understanding of these issues and agrees to the plan.  Reinforced healthy diet, lifestyle, and exercise.    Return in 6 months (on 9/30/2025).     Alicia Cheng DO, 3/31/2025     Supplementary Documentation:   General Health:  In the past six months, have you lost more than 10 pounds without trying?: 2 - No  Has your appetite been poor?: No  Type of Diet: Balanced  How does the patient maintain a good energy level?: Daily Walks  How would you describe your daily physical activity?: Moderate  How would you describe your current health state?: Fair  How do you maintain positive mental well-being?: Visiting Family, Visiting Friends, Social Interaction  On a scale of 0 to 10, with 0 being no pain and 10 being severe pain, what is your pain level?: 5 - (Moderate)  In the past six months, have you experienced urine leakage?: 1-Yes  At any time do you feel concerned for the safety/well-being of yourself and/or your children, in your home or elsewhere?: No  Have you had any immunizations at another office such as Influenza, Hepatitis B, Tetanus, or Pneumococcal?: No    Health Maintenance   Topic Date Due    Zoster Vaccines (1 of 2) Never done    Pneumococcal Vaccine: 50+ Years (3 of 3 - PCV20 or PCV21) 03/25/2022    COVID-19 Vaccine (3 - 2024-25 season) 09/01/2024    Annual Physical  04/08/2025    Colorectal Cancer Screening  05/03/2025    Mammogram  06/26/2025    Influenza Vaccine  Completed    DEXA Scan  Completed    Annual Depression Screening  Completed    Fall Risk Screening (Annual)  Completed    Meningococcal B Vaccine  Aged Out     Lung Cancer Counseling and Shared Decision Making Session in an Asymptomatic Smoker/Former Smoker   Lashonda Bullock is a 70 year old female without current symptoms of lung cancer.  History   Smoking Status    Former    Types: Cigarettes   Smokeless Tobacco     Never        She received information on the importance of adherence to annual lung cancer Low Dose CT (LDCT) screening, the impact of her comorbidities and her ability or willingness to undergo diagnosis and treatment. she is in agreement and an order will be placed for CT LUNG LD SCREENING(CPT=71271).    We counseled the importance of maintaining cigarette smoking abstinence if she is a former smoker and the importance of smoking cessation if she is a current smoker and we discussed and furnished information about tobacco cessation interventions.

## 2025-04-08 ENCOUNTER — TELEPHONE (OUTPATIENT)
Dept: NEUROLOGY | Facility: CLINIC | Age: 70
End: 2025-04-08

## 2025-04-08 NOTE — TELEPHONE ENCOUNTER
Received neurology clearance request from [facility] for colonoscopy 6/25/25. Placed in provider folder for review and signature.

## 2025-04-16 ENCOUNTER — LAB ENCOUNTER (OUTPATIENT)
Dept: LAB | Age: 70
End: 2025-04-16
Attending: FAMILY MEDICINE
Payer: MEDICARE

## 2025-04-16 DIAGNOSIS — Z11.59 NEED FOR HEPATITIS C SCREENING TEST: ICD-10-CM

## 2025-04-16 DIAGNOSIS — E03.9 HYPOTHYROIDISM, UNSPECIFIED TYPE: ICD-10-CM

## 2025-04-16 DIAGNOSIS — R73.9 HYPERGLYCEMIA: ICD-10-CM

## 2025-04-16 DIAGNOSIS — I10 ESSENTIAL HYPERTENSION: ICD-10-CM

## 2025-04-16 DIAGNOSIS — D64.9 ANEMIA, UNSPECIFIED TYPE: ICD-10-CM

## 2025-04-16 LAB
ALBUMIN SERPL-MCNC: 4.8 G/DL (ref 3.2–4.8)
ALBUMIN/GLOB SERPL: 1.8 {RATIO} (ref 1–2)
ALP LIVER SERPL-CCNC: 99 U/L (ref 55–142)
ALT SERPL-CCNC: 14 U/L (ref 10–49)
ANION GAP SERPL CALC-SCNC: 10 MMOL/L (ref 0–18)
AST SERPL-CCNC: 24 U/L (ref ?–34)
BASOPHILS # BLD AUTO: 0.08 X10(3) UL (ref 0–0.2)
BASOPHILS NFR BLD AUTO: 1.3 %
BILIRUB SERPL-MCNC: 0.4 MG/DL (ref 0.2–1.1)
BUN BLD-MCNC: 18 MG/DL (ref 9–23)
CALCIUM BLD-MCNC: 9.9 MG/DL (ref 8.7–10.6)
CHLORIDE SERPL-SCNC: 104 MMOL/L (ref 98–112)
CHOLEST SERPL-MCNC: 212 MG/DL (ref ?–200)
CO2 SERPL-SCNC: 29 MMOL/L (ref 21–32)
CREAT BLD-MCNC: 1.23 MG/DL (ref 0.55–1.02)
DEPRECATED HBV CORE AB SER IA-ACNC: 30 NG/ML (ref 50–306)
EGFRCR SERPLBLD CKD-EPI 2021: 48 ML/MIN/1.73M2 (ref 60–?)
EOSINOPHIL # BLD AUTO: 0.22 X10(3) UL (ref 0–0.7)
EOSINOPHIL NFR BLD AUTO: 3.5 %
ERYTHROCYTE [DISTWIDTH] IN BLOOD BY AUTOMATED COUNT: 14.2 %
EST. AVERAGE GLUCOSE BLD GHB EST-MCNC: 117 MG/DL (ref 68–126)
FASTING PATIENT LIPID ANSWER: YES
FASTING STATUS PATIENT QL REPORTED: YES
GLOBULIN PLAS-MCNC: 2.6 G/DL (ref 2–3.5)
GLUCOSE BLD-MCNC: 105 MG/DL (ref 70–99)
HBA1C MFR BLD: 5.7 % (ref ?–5.7)
HCT VFR BLD AUTO: 45 % (ref 35–48)
HCV AB SERPL QL IA: NONREACTIVE
HDLC SERPL-MCNC: 70 MG/DL (ref 40–59)
HGB BLD-MCNC: 13.9 G/DL (ref 12–16)
IMM GRANULOCYTES # BLD AUTO: 0.01 X10(3) UL (ref 0–1)
IMM GRANULOCYTES NFR BLD: 0.2 %
LDLC SERPL CALC-MCNC: 125 MG/DL (ref ?–100)
LYMPHOCYTES # BLD AUTO: 1.87 X10(3) UL (ref 1–4)
LYMPHOCYTES NFR BLD AUTO: 29.9 %
MCH RBC QN AUTO: 28.3 PG (ref 26–34)
MCHC RBC AUTO-ENTMCNC: 30.9 G/DL (ref 31–37)
MCV RBC AUTO: 91.6 FL (ref 80–100)
MONOCYTES # BLD AUTO: 0.39 X10(3) UL (ref 0.1–1)
MONOCYTES NFR BLD AUTO: 6.2 %
NEUTROPHILS # BLD AUTO: 3.69 X10 (3) UL (ref 1.5–7.7)
NEUTROPHILS # BLD AUTO: 3.69 X10(3) UL (ref 1.5–7.7)
NEUTROPHILS NFR BLD AUTO: 58.9 %
NONHDLC SERPL-MCNC: 142 MG/DL (ref ?–130)
OSMOLALITY SERPL CALC.SUM OF ELEC: 298 MOSM/KG (ref 275–295)
PLATELET # BLD AUTO: 315 10(3)UL (ref 150–450)
POTASSIUM SERPL-SCNC: 4.2 MMOL/L (ref 3.5–5.1)
PROT SERPL-MCNC: 7.4 G/DL (ref 5.7–8.2)
RBC # BLD AUTO: 4.91 X10(6)UL (ref 3.8–5.3)
SODIUM SERPL-SCNC: 143 MMOL/L (ref 136–145)
T4 FREE SERPL-MCNC: 1.4 NG/DL (ref 0.8–1.7)
TRIGL SERPL-MCNC: 95 MG/DL (ref 30–149)
TSI SER-ACNC: 0.47 UIU/ML (ref 0.55–4.78)
VLDLC SERPL CALC-MCNC: 17 MG/DL (ref 0–30)
WBC # BLD AUTO: 6.3 X10(3) UL (ref 4–11)

## 2025-04-16 PROCEDURE — 85025 COMPLETE CBC W/AUTO DIFF WBC: CPT

## 2025-04-16 PROCEDURE — 82728 ASSAY OF FERRITIN: CPT

## 2025-04-16 PROCEDURE — 84443 ASSAY THYROID STIM HORMONE: CPT

## 2025-04-16 PROCEDURE — 83036 HEMOGLOBIN GLYCOSYLATED A1C: CPT

## 2025-04-16 PROCEDURE — 80053 COMPREHEN METABOLIC PANEL: CPT

## 2025-04-16 PROCEDURE — 36415 COLL VENOUS BLD VENIPUNCTURE: CPT

## 2025-04-16 PROCEDURE — 86803 HEPATITIS C AB TEST: CPT

## 2025-04-16 PROCEDURE — 80061 LIPID PANEL: CPT

## 2025-04-16 PROCEDURE — 84439 ASSAY OF FREE THYROXINE: CPT

## 2025-04-18 ENCOUNTER — TELEPHONE (OUTPATIENT)
Age: 70
End: 2025-04-18

## 2025-04-18 NOTE — TELEPHONE ENCOUNTER
Neurology cearance letter digned by Dr Little, [facility] with fax confirmation received. and sent to scanning.

## 2025-04-18 NOTE — TELEPHONE ENCOUNTER
Pt is calling to schedule a new consult appt.  Patient Name-Lashonda Bullock  03/09/1955  Referred to -Dr. Clifford  Referred by- Alicia Cheng  Reason-  Low ferritin   Insurance- Medicare Part B  Please give pt a call back. Thank you

## 2025-04-21 DIAGNOSIS — U07.1 COVID-19 VIRUS INFECTION: ICD-10-CM

## 2025-04-22 RX ORDER — BENZONATATE 200 MG/1
200 CAPSULE ORAL 3 TIMES DAILY PRN
Qty: 30 CAPSULE | Refills: 0 | OUTPATIENT
Start: 2025-04-22

## 2025-04-22 RX ORDER — LACTULOSE 10 G/15ML
30 SOLUTION ORAL 3 TIMES DAILY
Qty: 237 ML | Refills: 0 | Status: SHIPPED | OUTPATIENT
Start: 2025-04-22

## 2025-04-22 NOTE — TELEPHONE ENCOUNTER
.A refill request was received for:  Requested Prescriptions     Pending Prescriptions Disp Refills    LACTULOSE 10 GM/15ML Oral Solution [Pharmacy Med Name: Lactulose Oral Solution 10 GM/15ML]  0     Sig: TAKE 30 ML BY MOUTH THREE TIMES DAILY       Last refill date:   1/20/25    Last office visit: 3/31/25    Follow up due:  Future Appointments   Date Time Provider Department Center   4/30/2025  8:30 AM Logan Wyatt DO [facility]   4/30/2025  8:45 AM Logan Wyatt DO [facility] SUB GI   5/7/2025 10:00 AM Rosi Toledo DO NP [facility] [place] [facility]   9/29/2025 10:00 AM Alicia Cheng DO [facility] [address]   10/20/2025 10:20 AM Erick Corea MD [id number] [place]

## 2025-04-23 ENCOUNTER — LAB ENCOUNTER (OUTPATIENT)
Dept: LAB | Age: 70
End: 2025-04-23
Attending: FAMILY MEDICINE
Payer: MEDICARE

## 2025-04-23 ENCOUNTER — OFFICE VISIT (OUTPATIENT)
Dept: FAMILY MEDICINE CLINIC | Facility: CLINIC | Age: 70
End: 2025-04-23
Payer: MEDICARE

## 2025-04-23 VITALS
BODY MASS INDEX: 25.66 KG/M2 | OXYGEN SATURATION: 97 % | RESPIRATION RATE: 19 BRPM | SYSTOLIC BLOOD PRESSURE: 120 MMHG | HEART RATE: 57 BPM | TEMPERATURE: 98 F | HEIGHT: 65 IN | WEIGHT: 154 LBS | DIASTOLIC BLOOD PRESSURE: 82 MMHG

## 2025-04-23 DIAGNOSIS — R39.9 UTI SYMPTOMS: Primary | ICD-10-CM

## 2025-04-23 DIAGNOSIS — R79.89 LOW TSH LEVEL: ICD-10-CM

## 2025-04-23 LAB
APPEARANCE: CLEAR
BILIRUBIN: NEGATIVE
GLUCOSE (URINE DIPSTICK): NEGATIVE MG/DL
KETONES (URINE DIPSTICK): NEGATIVE MG/DL
LEUKOCYTES: NEGATIVE
MULTISTIX LOT#: NORMAL NUMERIC
NITRITE, URINE: NEGATIVE
OCCULT BLOOD: NEGATIVE
PH, URINE: 5.5 (ref 4.5–8)
PROTEIN (URINE DIPSTICK): NEGATIVE MG/DL
SPECIFIC GRAVITY: 1.02 (ref 1–1.03)
T4 FREE SERPL-MCNC: 1.5 NG/DL (ref 0.8–1.7)
TSI SER-ACNC: 0.38 UIU/ML (ref 0.55–4.78)
UROBILINOGEN,SEMI-QN: 0.2 MG/DL (ref 0–1.9)

## 2025-04-23 PROCEDURE — 99213 OFFICE O/P EST LOW 20 MIN: CPT | Performed by: NURSE PRACTITIONER

## 2025-04-23 PROCEDURE — 84443 ASSAY THYROID STIM HORMONE: CPT

## 2025-04-23 PROCEDURE — 84439 ASSAY OF FREE THYROXINE: CPT

## 2025-04-23 PROCEDURE — 87086 URINE CULTURE/COLONY COUNT: CPT | Performed by: NURSE PRACTITIONER

## 2025-04-23 PROCEDURE — 81003 URINALYSIS AUTO W/O SCOPE: CPT | Performed by: NURSE PRACTITIONER

## 2025-04-23 PROCEDURE — 36415 COLL VENOUS BLD VENIPUNCTURE: CPT

## 2025-04-23 NOTE — PROGRESS NOTES
CHIEF COMPLAINT:     Chief Complaint   Patient presents with    UTI     Day 2-3 of having the urge to use bathroom, a little burning        HPI:   Lashonda Bullock is a 70 year old female who presents with symptoms of UTI. Complaining of urinary urgency,  slight dysuria for last 2-3 days. Symptoms have been consistent since onset.  Treatments tried: none.  Associated symptoms: mild generalized abdominal pain, pt. Is getting worked up for this and having colonoscopy next week.  Reports just wanted to check not UTI as well. Reports recently on switched to generic Mrabegron from myrbetriq and unsure if this is causing symptoms. .   Denies flank pain, fever, hematuria, nausea, or vomiting.  Denies vaginal discharge or itching, new sexual partners in the last 3 months, or recent unprotected sexual intercourse.     Current Medications[1]   Past Medical History[2]   Social History:  Short Social Hx on File[3]      REVIEW OF SYSTEMS:   GENERAL: Denies fever, chills, or body aches  SKIN: no rashes, no skin wounds or ulcers.  EYES:denies blurred vision or double vision  HEENT: no congestion, rhinorrhea, sore throat or ear pain  CARDIOVASCULAR: denies chest pain or palpitations  LUNGS: denies shortness of breath, cough, or wheezing  GI: See HPI. No N/V/C/D.   : See HPI.  NEURO: no headaches.    EXAM:   /82   Pulse 57   Temp 97.8 °F (36.6 °C)   Resp 19   Ht 5' 5\" (1.651 m)   Wt 154 lb (69.9 kg)   SpO2 97%   BMI 25.63 kg/m²   GENERAL: well developed, well nourished,in no apparent distress  CARDIO: RRR, no murmurs  LUNGS: clear to ausculation bilaterally, no wheezing or rhonchi  GI: BS present x 4.  No hepatosplenomegaly.  : Mild suprapubic tenderness, bladder distention, or CVAT     Recent Results (from the past 24 hours)   URINALYSIS, AUTO, W/O SCOPE    Collection Time: 04/23/25 10:49 AM   Result Value Ref Range    Glucose Urine Negative Negative mg/dL    Bilirubin Urine Negative Negative    Ketones, UA  Negative Negative - Trace mg/dL    Spec Gravity 1.020 1.005 - 1.030    Blood Urine Negative Negative    PH Urine 5.5 5.0 - 8.0    Protein Urine Negative Negative - Trace mg/dL    Urobilinogen Urine 0.2 0.2 - 1.0 mg/dL    Nitrite Urine Negative Negative    Leukocyte Esterase Urine Negative Negative    APPEARANCE clear Clear    Color Urine Yello Yellow    Multistix Lot# [id number] Numeric    Multistix Expiration Date 3-31.2026 Date         ASSESSMENT AND PLAN:   Lashonda Bullock is a 70 year old female presents with UTI symptoms.    ASSESSMENT:  Encounter Diagnosis   Name Primary?    UTI symptoms Yes       PLAN:Discussed dip. Will send Urine culture to verify no bacteria, but may just be due to OAB symptoms. Patient/Parent has given us consent to send out a culture and understand that they will be contacted in 2-3 days with culture results. If any severe back pain, inability to urinate, fever >101 or persistent vomiting seek emergent care.         Meds & Refills for this Visit:  Requested Prescriptions      No prescriptions requested or ordered in this encounter       Risk and benefits of medication discussed. Stressed importance of completing full course of antibiotic.     There are no Patient Instructions on file for this visit.      The patient indicates understanding of these issues and agrees to the plan.  The patient is asked to return in 3 days if not better. Call if fever, vomiting, worsening symptoms.  Go to ED if back/flank pain with fever and vomiting.         [1]   Current Outpatient Medications   Medication Sig Dispense Refill    lactulose 10 GM/15ML Oral Solution TAKE 30 ML BY MOUTH THREE TIMES DAILY 237 mL 0    EPINEPHrine (EPIPEN) 0.3 MG/0.3ML Injection Solution Auto-injector Inject into skin as needed for severe allergic reaction 1 each 0    levothyroxine 75 MCG Oral Tab Take 1 tablet (75 mcg total) by mouth before breakfast. 90 tablet 1    metFORMIN 500 MG Oral Tab Take 1 tablet (500 mg total) by mouth 2  (two) times daily with meals. 180 tablet 1    metoprolol tartrate 25 MG Oral Tab Take 2 tablets (50 mg total) by mouth daily. 180 tablet 1    simvastatin 20 MG Oral Tab Take 1 tablet (20 mg total) by mouth every evening. 90 tablet 1    PEG 3350-KCl-Na Bicarb-NaCl 420 g Oral Recon Soln Take as directed by your doctor (Patient not taking: Reported on 3/31/2025) 4000 mL 0    Tenapanor HCl (IBSRELA) 50 MG Oral Tab Take 50 mg by mouth in the morning and 50 mg before bedtime. Take immediately prior to breakfast or the first meal of the day and immediately prior to dinner.. 60 tablet 3    pantoprazole 40 MG Oral Tab EC Take one tablet (40 mg total) by mouth twice daily, 30 minutes prior to breakfast and 30 minutes before dinner. 180 tablet 3    topiramate 25 MG Oral Tab Day 1-5: 1 tab nightly, Week 2: take 2 tabs nightly, Week 3: take 3 tabs nightly, Week 4: if tolerating, and if headaches not improved, increase to 4 tabs nightly 90 tablet 1    PANTOPRAZOLE 40 MG Oral Tab EC TAKE 1 TABLET BY MOUTH EVERY DAY 30 MINUTES BEFORE BREAKFAST 90 tablet 0    meclizine 25 MG Oral Tab Take 0.5 tablets (12.5 mg total) by mouth 3 (three) times daily as needed. 90 tablet 2    hydroxychloroquine (PLAQUENIL) 200 MG Oral Tab Take 1 tablet (200 mg total) by mouth 2 (two) times daily. 180 tablet 3    fluticasone propionate 50 MCG/ACT Nasal Suspension 2 sprays by Nasal route nightly. 3 each 0    Mirabegron ER (MYRBETRIQ) 50 MG Oral Tablet 24 Hr Take 1 tablet (50 mg total) by mouth daily. 90 tablet 3    acidophilus-pectin Oral Cap Take 1 capsule by mouth daily.      Cyclobenzaprine HCl 5 MG Oral Tab Take 1 tablet (5 mg total) by mouth 3 (three) times daily as needed for Muscle spasms. 30 tablet 0    ondansetron 4 MG Oral Tablet Dispersible Take 1 tablet (4 mg total) by mouth every 8 (eight) hours as needed for Nausea. 10 tablet 0    aspirin 81 MG Oral Tab Take 1 tablet (81 mg total) by mouth daily.      Cholecalciferol (VITAMIN D-3 OR) Take  1,000 Units by mouth daily.        VITAMIN E Take 400 mg by mouth daily.        Cyanocobalamin (VITAMIN B-12 CR OR) Take  by mouth.      Calcium Carbonate (CALTRATE 600 OR) Take  by mouth.     [2]   Past Medical History:   Abdominal distention    Abdominal pain    Acute, but ill-defined, cerebrovascular disease    Anxiety    Arthritis    Basal cell carcinoma of skin    Bleeding nose    Bloating    Blood in urine    Calculus of kidney    Constipation    Depressive disorder, not elsewhere classified    Diverticulitis    Dizziness    Easy bruising    Endometriosis    Endometriosis, site unspecified    Fatigue    Frequent urination    Frequent UTI    Headache disorder    Heartburn    Hemorrhoids    Indigestion    Insomnia, unspecified    Irregular bowel habits    Leaking of urine    Leg swelling    Neuralgia    Night sweats    Osteoporosis    Pain with bowel movements    Skin blushing/flushing    Sleep disturbance    Stool incontinence    Stress    Thyroid disease    Wears glasses    Weight gain   [3]   Social History  Socioeconomic History    Marital status:    Tobacco Use    Smoking status: Former     Current packs/day: 0.00     Average packs/day: 0.3 packs/day for 20.0 years (5.0 ttl pk-yrs)     Types: Cigarettes     Start date: 1997     Quit date: 2017     Years since quittin.9    Smokeless tobacco: Never   Vaping Use    Vaping status: Never Used   Substance and Sexual Activity    Alcohol use: No     Alcohol/week: 0.0 standard drinks of alcohol     Comment: socially    Drug use: No   Other Topics Concern    Caffeine Concern Yes     Comment: 1-2 cups daily    Exercise Yes     Comment: active

## 2025-04-30 PROBLEM — R11.0 NAUSEA: Status: ACTIVE | Noted: 2025-04-30

## 2025-04-30 PROBLEM — Z86.0101 PERSONAL HISTORY OF ADENOMATOUS AND SERRATED COLON POLYPS: Status: ACTIVE | Noted: 2025-04-30

## 2025-04-30 PROBLEM — D12.5 BENIGN NEOPLASM OF SIGMOID COLON: Status: ACTIVE | Noted: 2025-04-30

## 2025-04-30 PROBLEM — K21.9 GASTROESOPHAGEAL REFLUX DISEASE: Status: ACTIVE | Noted: 2025-04-30

## 2025-04-30 PROBLEM — D50.9 ANEMIA, IRON DEFICIENCY: Status: ACTIVE | Noted: 2025-04-30

## 2025-05-07 ENCOUNTER — OFFICE VISIT (OUTPATIENT)
Age: 70
End: 2025-05-07
Attending: INTERNAL MEDICINE
Payer: MEDICARE

## 2025-05-07 VITALS
HEIGHT: 65 IN | TEMPERATURE: 97 F | BODY MASS INDEX: 24.99 KG/M2 | WEIGHT: 150 LBS | OXYGEN SATURATION: 100 % | SYSTOLIC BLOOD PRESSURE: 171 MMHG | RESPIRATION RATE: 16 BRPM | HEART RATE: 64 BPM | DIASTOLIC BLOOD PRESSURE: 82 MMHG

## 2025-05-07 DIAGNOSIS — Z13.29 SCREENING FOR THYROID DISORDER: ICD-10-CM

## 2025-05-07 DIAGNOSIS — D50.0 IRON DEFICIENCY ANEMIA DUE TO CHRONIC BLOOD LOSS: Primary | ICD-10-CM

## 2025-05-07 LAB
FOLATE SERPL-MCNC: 15.3 NG/ML (ref 5.4–?)
HGB RETIC QN AUTO: 32.5 PG (ref 28.2–36.6)
IMM RETICS NFR: 0.08 RATIO (ref 0.1–0.3)
IRON SATN MFR SERPL: 15 % (ref 15–50)
IRON SERPL-MCNC: 53 UG/DL (ref 50–170)
RETICS # AUTO: 41 X10(3) UL (ref 22.5–147.5)
RETICS/RBC NFR AUTO: 0.9 % (ref 0.5–2.5)
TOTAL IRON BINDING CAPACITY: 348 UG/DL (ref 250–425)
TRANSFERRIN SERPL-MCNC: 290 MG/DL (ref 250–380)
VIT B12 SERPL-MCNC: 856 PG/ML (ref 211–911)

## 2025-05-07 NOTE — PROGRESS NOTES
Pt here for new consult regarding low ferritin. Medications and medical history reviewed. Pt states she is fatigued with intermittent lightheadedness/dizziness. 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 No hx of iron tablets, iron infusion, or blood transfusions. She did have an EGD/colonoscopy last week. Intermittent light bleeding with hemorrhoids. Daughter and granddaughter are both anemic.         Education Record    Learner:  Patient and Family Member    Disease / Diagnosis: low ferritin    Barriers / Limitations:  None   Comments:    Method:  Discussion   Comments:    General Topics:  Medication, Pain, Side effects and symptom management, and Plan of care reviewed   Comments:    Outcome:  Observed demonstration and Shows understanding   Comments:

## 2025-05-07 NOTE — PATIENT INSTRUCTIONS
Hello,     You were seen today for your diagnosis of iron deficiency anemia. Your ferritin (storage of iron) is low and you need IV iron.     I have ordered 1 dose of IV Iron Dextran (INFeD) for you. Please schedule this once insurance approves the IV iron (our team will call you to make the appointment).    We attached some information about iron-rich foods (kale, spinach, dark/leafy vegetables). Please try to incorporate these into your diet.     Please schedule a follow up appointment to see me again 6-8 weeks after your infusion. We will repeat iron studies (labs) at this time to see if you absorbed the iron appropriately.     Thank you!     It was a pleasure meeting you.  Dr. Toledo

## 2025-05-07 NOTE — CONSULTS
[facility] Initial Consultation Note    Patient Name: Lashonda Bullock  Medical Record Number: [id number]    YOB: 1955   Date of Consultation: 5/7/2025     Reason for Consultation/Chief Complaint:  Alicia Cheng DO  Physician requesting consultation: iron deficiency without anemia    Oncologic History:  4/30/25: EGD/colonoscopy with gastritis. Three polyps removed. All biopsies negative for malignancy.     History of Present Illness:  Mrs. Bullock is a 70 year old F with PMHx of endometriosis s/p hysterectomy, gastritis, diverticulitis, and hemorrhoids who presents to hematology/oncology today for evaluation and treatment recommendations of iron deficiency without anemia.     She endorses fatigue, intermittent dizziness, and lightheadedness. 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     Iron studies 4/16/25 with ferritin 30 ng/mL. Hb normal 13.9.      Past Medical History:  Past Medical History:    Abdominal distention    Abdominal pain    Acute, but ill-defined, cerebrovascular disease    Anxiety    Arthritis    Basal cell carcinoma of skin    Bleeding nose    Bloating    Blood in urine    Calculus of kidney    Constipation    Depressive disorder, not elsewhere classified    Diverticulitis    Dizziness    Easy bruising    Endometriosis    Endometriosis, site unspecified    Fatigue    Frequent urination    Frequent UTI    Headache disorder    Heartburn    Hemorrhoids    Indigestion    Insomnia, unspecified    Irregular bowel habits    Leaking of urine    Leg swelling    Neuralgia    Night sweats    Osteoporosis    Pain with bowel movements    Skin blushing/flushing    Sleep disturbance    Stool incontinence    Stress    Thyroid disease    Wears glasses    Weight gain       Past Surgical History:  Past Surgical History:   Procedure Laterality Date    Appendectomy      Appendectomy      Breast surgery procedure unlisted      lumpectomy    Cholecystectomy       Colonoscopy      Egd      Hysterectomy  1991    total hystero.    Moreno localization wire 1 site left (cpt=19281)  1999    ADH    Moreno localization wire 1 site right (cpt=19281) Right 1990    benign.    Moreno localization wire 2 site right (cpt=19281/52610) Right 1993    benign.    Needle biopsy right  2012    US guided biopsy-benign    Oophorectomy Bilateral 1989    total hystero.    Other  1/1/00'    bronchoscopy w excision of tumor    Other      thoracotomy    Other surgical history      gallbladder    Other surgical history      thyroidectomy    Sigmoidoscopy,diagnostic      Thyroidectomy      Tonsillectomy      adenoidectomy    Total abdom hysterectomy         Current Outpatient Medications:  Current Outpatient Medications on File Prior to Visit   Medication Sig Dispense Refill    levothyroxine 50 MCG Oral Tab Patient to take 50 mcg 2 days a week. Will take 75 mcg 5 days a week. 30 tablet 1    lactulose 10 GM/15ML Oral Solution Take 30 mL (20 g total) by mouth 3 (three) times daily. 2700 mL 3    lactulose 20 GM/30ML Oral Solution Take 30 mL (20 g total) by mouth 3 (three) times daily.      lactulose 10 GM/15ML Oral Solution TAKE 30 ML BY MOUTH THREE TIMES DAILY 237 mL 0    EPINEPHrine (EPIPEN) 0.3 MG/0.3ML Injection Solution Auto-injector Inject into skin as needed for severe allergic reaction 1 each 0    levothyroxine 75 MCG Oral Tab Take 1 tablet (75 mcg total) by mouth before breakfast. 90 tablet 1    metFORMIN 500 MG Oral Tab Take 1 tablet (500 mg total) by mouth 2 (two) times daily with meals. 180 tablet 1    metoprolol tartrate 25 MG Oral Tab Take 2 tablets (50 mg total) by mouth daily. 180 tablet 1    simvastatin 20 MG Oral Tab Take 1 tablet (20 mg total) by mouth every evening. 90 tablet 1    pantoprazole 40 MG Oral Tab EC Take one tablet (40 mg total) by mouth twice daily, 30 minutes prior to breakfast and 30 minutes before dinner. 180 tablet 3    PANTOPRAZOLE 40 MG Oral Tab EC TAKE 1 TABLET BY MOUTH  EVERY DAY 30 MINUTES BEFORE BREAKFAST 90 tablet 0    hydroxychloroquine (PLAQUENIL) 200 MG Oral Tab Take 1 tablet (200 mg total) by mouth 2 (two) times daily. 180 tablet 3    Mirabegron ER (MYRBETRIQ) 50 MG Oral Tablet 24 Hr Take 1 tablet (50 mg total) by mouth daily. 90 tablet 3    ondansetron 4 MG Oral Tablet Dispersible Take 1 tablet (4 mg total) by mouth every 8 (eight) hours as needed for Nausea. 10 tablet 0    aspirin 81 MG Oral Tab Take 1 tablet (81 mg total) by mouth in the morning.      Cholecalciferol (VITAMIN D-3 OR) Take 1,000 Units by mouth in the morning.      VITAMIN E Take 400 mg by mouth in the morning.      Cyanocobalamin (VITAMIN B-12 CR OR) Take by mouth.      PEG 3350-KCl-Na Bicarb-NaCl 420 g Oral Recon Soln Take as directed by physician (Patient not taking: Reported on 5/7/2025) 4000 mL 0    PEG 3350-KCl-Na Bicarb-NaCl 420 g Oral Recon Soln Take as directed by your doctor (Patient not taking: Reported on 5/7/2025) 4000 mL 0     No current facility-administered medications on file prior to visit.       Allergies:   Allergies   Allergen Reactions    Adhesive Tape HIVES    Paxlovid [Nirmatrelvir-Ritonavir] RASH and SWELLING    Radiology Contrast Iodinated Dyes Tightness in Throat    Bee      Sting      Iodine (Topical)      SOLN      Macrobid [Nitrofurantoin] OTHER (SEE COMMENTS)     SEVERE VOMITING AND DIARRHEA    Trees, [place]      [place] trees    Wellbutrin [Bupropion Hcl]        Family Medical History:  Family History   Problem Relation Age of Onset    Hypertension Father     Colon Polyps Father     Other (acute MI) Father     Ovarian Cancer Mother 69    Colon Polyps Mother     Other (fallopian tube cancer) Mother     Thyroid Cancer Mother     Cancer Maternal Grandmother     Breast Cancer Maternal Grandmother 69    Other (CHF) Maternal Grandfather     Cancer Paternal Grandfather         esophageal cancer       Social History:  Social History     Social History Narrative    Not on file      Social History     Socioeconomic History    Marital status:    Tobacco Use    Smoking status: Former     Current packs/day: 0.00     Average packs/day: 0.3 packs/day for 20.0 years (5.0 ttl pk-yrs)     Types: Cigarettes     Start date: 1997     Quit date: 2017     Years since quittin.9    Smokeless tobacco: Never   Vaping Use    Vaping status: Never Used   Substance and Sexual Activity    Alcohol use: No     Alcohol/week: 0.0 standard drinks of alcohol     Comment: socially    Drug use: No   Other Topics Concern    Caffeine Concern Yes     Comment: 1-2 cups daily    Exercise Yes     Comment: active       Review of Systems:  A 10-point ROS was done with pertinent positives and negatives per the HPI.     ECOG Performance Status: 1    Vital Signs:  Height: 165.1 cm (5' 5\") (1009)  Weight: 68 kg (150 lb) (1009)  BSA (Calculated - sq m): 1.75 sq meters (1009)  Pulse: 64 (1009)  BP: 171/82 (1009)  Temp: 96.9 °F (36.1 °C) (1009)  Do Not Use - Resp Rate: --  SpO2: 100 % (1009)  Wt Readings from Last 6 Encounters:   25 68 kg (150 lb)   25 69.9 kg (154 lb)   25 69.3 kg (152 lb 12.8 oz)   25 67.6 kg (149 lb)   24 68 kg (150 lb)   24 69.4 kg (153 lb)       Physical Examination:  General: Well-nourished and well-appearing. No acute distress.  Eyes: EOMI, bilateral conjunctiva normal. Sclera anicteric.  ENT: Oropharynx is clear. Mucous membranes moist.  Lymph Nodes: No cervical or supraclavicular lymphadenopathy.  Respiratory: Lungs clear to auscultation bilaterally.  Cardiovascular: Regular rate and rhythm, no murmurs. No lower extremity edema.   GI: Soft, non-tender, non-distended with normoactive bowel sounds. No hepatosplenomegaly.  Heme: normal capillary refill. No ecchymosis, petechiae, or purpura.  Neurological: Alert and oriented. No apparent focal sensory or motor deficits  Skin: no rashes or lesions.  Psychiatric:  Normal mood and affect.    Data Review  Laboratory Studies:  No results for input(s): \"WBC\", \"HGB\", \"HCT\", \"PLT\", \"MCV\", \"RDW\", \"NEPRELIM\" in the last 168 hours.  No results for input(s): \"NA\", \"K\", \"CL\", \"CO2\", \"BUN\", \"CREATSERUM\", \"GFRAA\", \"GFRNAA\", \"GLU\", \"CA\", \"PHOS\", \"TP\", \"ALB\", \"ALKPHO\", \"AST\", \"ALT\", \"BILT\" in the last 168 hours.    Invalid input(s): \"MAG\"  No results for input(s): \"PT\", \"INR\", \"PTT\", \"FIB\" in the last 168 hours.        Impression/Recommendations:  Mrs. Bullock is a 70 year old F with PMHx of gastritis, diverticulitis, and hemorrhoids who presents to hematology/oncology today for evaluation and treatment recommendations of iron deficiency without anemia.       Iron Deficiency without anemia  - GI workup: 4/30/25: EGD/colonoscopy with gastritis. Three polyps removed. All biopsies negative for malignancy.   - Has a history of endometriosis. Got hysterectomy  - Symptoms: She endorses fatigue, intermittent dizziness, and lightheadedness. 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   - Iron studies 4/16/25 with ferritin 30 ng/mL. Hb normal 13.9.    Recommendations:  Labs today: iron/TIBC, iron saturation, reticulocyte count, Vit B12, folate, soluble transferrin receptor  Incorporate iron rich foods into diet    Patient is symptomatic from severe iron deficiency (30 ng/mL), as per H&P and assessment above.  Patient will benefit from IV iron, with a targeted goal of ferritin 50 ng/mL. Today, I discussed with the patient her diagnosis of iron deficiency anemia, and the proposed treatment of IV iron. Benefits and risks were discussed in detail. Written information was also provided. After thorough discussion, patient has consented to start IV Iron Dextran (1 dose, 1000 mg, IV over 1 hour -- with 25 mg test dose to be given prior to infusion).    Orders were placed for IV Iron Dextran. Patient will schedule appointment for IV iron infusion once insurance approval is  obtained.      Follow Up: Follow-up in 6 weeks after IV iron infusion is completed for labs and visit with me. We will re-check iron studies at that time.     Rosi Toledo D.O.  [facility]        Note to patient: The 21 Century Cures Act makes medical notes like these available to patients in the interest of transparency. However, be advised this is a medical document. It is intended as peer to peer communication. It is written in medical language and may contain abbreviations or verbiage that are unfamiliar. It may appear blunt or direct. Medical documents are intended to carry relevant information, facts as evident, and the clinical opinion of the practitioner.       In reviewing this note, please be advised that Dragon Voice Recognition software used to dictate the note may have made errors in recognizing some of the words or phrases.

## 2025-05-12 LAB — SOL TRANSFERRIN RECEPTOR: 19.8 NMOL/L

## 2025-05-14 ENCOUNTER — OFFICE VISIT (OUTPATIENT)
Age: 70
End: 2025-05-14
Attending: INTERNAL MEDICINE
Payer: MEDICARE

## 2025-05-14 VITALS
RESPIRATION RATE: 16 BRPM | HEART RATE: 62 BPM | SYSTOLIC BLOOD PRESSURE: 124 MMHG | OXYGEN SATURATION: 96 % | DIASTOLIC BLOOD PRESSURE: 84 MMHG | TEMPERATURE: 98 F

## 2025-05-14 DIAGNOSIS — D50.0 IRON DEFICIENCY ANEMIA DUE TO CHRONIC BLOOD LOSS: Primary | ICD-10-CM

## 2025-05-14 RX ORDER — DIPHENHYDRAMINE HCL 25 MG
25 CAPSULE ORAL ONCE
Status: CANCELLED
Start: 2025-05-14 | End: 2025-05-14

## 2025-05-14 RX ORDER — FAMOTIDINE 10 MG/ML
20 INJECTION, SOLUTION INTRAVENOUS ONCE
Status: COMPLETED | OUTPATIENT
Start: 2025-05-14 | End: 2025-05-14

## 2025-05-14 RX ORDER — ONDANSETRON 2 MG/ML
8 INJECTION INTRAMUSCULAR; INTRAVENOUS ONCE
Status: COMPLETED | OUTPATIENT
Start: 2025-05-14 | End: 2025-05-14

## 2025-05-14 RX ORDER — DIPHENHYDRAMINE HCL 25 MG
25 CAPSULE ORAL ONCE
Status: COMPLETED | OUTPATIENT
Start: 2025-05-14 | End: 2025-05-14

## 2025-05-14 RX ORDER — METHYLPREDNISOLONE SODIUM SUCCINATE 40 MG/ML
40 INJECTION INTRAMUSCULAR; INTRAVENOUS ONCE
Status: COMPLETED | OUTPATIENT
Start: 2025-05-14 | End: 2025-05-14

## 2025-05-14 RX ORDER — METHYLPREDNISOLONE SODIUM SUCCINATE 40 MG/ML
40 INJECTION INTRAMUSCULAR; INTRAVENOUS ONCE
Status: CANCELLED
Start: 2025-05-14 | End: 2025-05-14

## 2025-05-14 RX ORDER — FAMOTIDINE 10 MG/ML
20 INJECTION, SOLUTION INTRAVENOUS ONCE
Status: CANCELLED
Start: 2025-05-14 | End: 2025-05-14

## 2025-05-14 RX ADMIN — METHYLPREDNISOLONE SODIUM SUCCINATE 40 MG: 40 INJECTION INTRAMUSCULAR; INTRAVENOUS at 14:59:00

## 2025-05-14 RX ADMIN — FAMOTIDINE 20 MG: 10 INJECTION, SOLUTION INTRAVENOUS at 14:56:00

## 2025-05-14 RX ADMIN — ONDANSETRON 8 MG: 2 INJECTION INTRAMUSCULAR; INTRAVENOUS at 15:52:00

## 2025-05-14 RX ADMIN — DIPHENHYDRAMINE HCL 25 MG: 25 MG CAPSULE ORAL at 14:55:00

## 2025-05-14 NOTE — PROGRESS NOTES
Pt here for iron infusion.  Arrives Ambulating independently, accompanied by Other - dghtr           Modifications in dose or schedule: Yes - Pepcid IVP, SoluMedrol IVP and oral Benadryl added as pre-meds d/t pt's concern and hx of severe allergic reactions     1550 - Pt reports nausea, contacted SERGIO Richard rec'd order for Zofran 8mg.    1600 - Pt reports nausea has improved, not gone, but improved.     Frequency of blood return and site check throughout administration: Prior to administration   Discharged to Home, Ambulating independently, accompanied by:Other - dghtr    Outpatient Oncology Care Plan  Problem list:  anemia  Problems related to:    disease/disease progression  Interventions:  administered iron  Expected outcomes:  optimal lab values  Progress towards outcome:  making progress    Education Record    Learner:  Patient  Barriers / Limitations:  None  Method:  Brief focused  Outcome:  Shows understanding  Comments:observed pt 30 min post infusion. Pt tolerated infusion. No c/o. VSS. See flowsheet

## 2025-05-23 ENCOUNTER — OFFICE VISIT (OUTPATIENT)
Dept: SURGERY | Facility: CLINIC | Age: 70
End: 2025-05-23

## 2025-05-23 DIAGNOSIS — R82.90 URINE FINDING: ICD-10-CM

## 2025-05-23 DIAGNOSIS — N32.81 OAB (OVERACTIVE BLADDER): Primary | ICD-10-CM

## 2025-05-23 LAB
APPEARANCE: CLEAR
BILIRUBIN: NEGATIVE
GLUCOSE (URINE DIPSTICK): NEGATIVE MG/DL
KETONES (URINE DIPSTICK): NEGATIVE MG/DL
LEUKOCYTES: NEGATIVE
MULTISTIX LOT#: ABNORMAL NUMERIC
NITRITE, URINE: NEGATIVE
PH, URINE: 6 (ref 4.5–8)
PROTEIN (URINE DIPSTICK): NEGATIVE MG/DL
SPECIFIC GRAVITY: <=1.005 (ref 1–1.03)
URINE-COLOR: YELLOW
UROBILINOGEN,SEMI-QN: 0.2 MG/DL (ref 0–1.9)

## 2025-05-23 PROCEDURE — 81003 URINALYSIS AUTO W/O SCOPE: CPT | Performed by: PHYSICIAN ASSISTANT

## 2025-05-23 PROCEDURE — 99213 OFFICE O/P EST LOW 20 MIN: CPT | Performed by: PHYSICIAN ASSISTANT

## 2025-05-23 NOTE — PATIENT INSTRUCTIONS
Gemtesa -> beta agonist like myrbetriq    Alternatives are trospium chloride either 20mg twice daily or once daily extended release  The other option is solifenacin

## 2025-05-23 NOTE — PROGRESS NOTES
Subjective:   Lashonda Bullock is a 70 year old female with a PMH of HTN, HL, hypothyroid, GERD, chronic constipation (goes 4-5 days without BMs), diverticulosis, recurrent UTIs s/p neg work up, who presents today for follow up OAB.    Patient has not been seen since 2022 for OAB with UUI. She was overall doing well on myrbetriq regimen.     Notes now with new insurance went $50-> $502.   The generic >$200/month.     Continues to experience urinary urgency and UUI. Wearing 1 ppd. No gross hematuria or UTI since last visit.     Previously used estrace cream, discontinued d/t lack of effectiveness.  Prior PFT completed.     UA today trace blood    Tobacco hx: 30 pack years, quit 2017  Kidney stone hx: passed one stone in 2005  Fam h/o  malignancy: mom, MGM with breast cancer     CT urogram showed no abnormalities other than 4 mm right renal cortex calcification and we discussed that nothing needs to be done for this.  Pelvic exam shows atrophic vaginitis and tenderness to palpation. Mild urethral hypermobilitys  Office cysto 2021 showed very mild cystitis changes    History/Other:    Chief Complaint Reviewed and Verified  Nursing Notes Reviewed and   Verified  Allergies Reviewed  Medications Reviewed         Current Medications[1]    Review of Systems:  Pertinent items are noted in HPI.      Objective:   There were no vitals taken for this visit. Estimated body mass index is 24.96 kg/m² as calculated from the following:    Height as of 5/7/25: 5' 5\" (1.651 m).    Weight as of 5/7/25: 150 lb (68 kg).    No distress  Non labored respirations    Laboratory Data:  Lab Results   Component Value Date    WBC 6.3 04/16/2025    HGB 13.9 04/16/2025    .0 04/16/2025     Lab Results   Component Value Date     04/16/2025    K 4.2 04/16/2025     04/16/2025    CO2 29.0 04/16/2025    BUN 18 04/16/2025     (H) 04/16/2025    GFRAA 61 03/01/2022    AST 24 04/16/2025    ALT 14 04/16/2025    TP 7.4 04/16/2025     ALB 4.8 04/16/2025    CA 9.9 04/16/2025    MG 2.2 03/06/2024       Urinalysis Results (last three years):  Recent Labs     07/27/22  1145 07/27/22  1211 09/22/23  1109 10/09/23  0951 04/23/25  1049 05/23/25  0952   SPECGRAVITY <=1.005  --  1.020* >=1.030 1.020 <=1.005   PHURINE 5.5  --  7.0 6.0 5.5 6.0   NITRITE Negative  --  Negative Negative Negative Negative   WBCUR  --  1-5  --   --   --   --    RBCUR  --  0-2  --   --   --   --    BACUR  --  None Seen  --   --   --   --        Urine Culture Results (last three years):  Lab Results   Component Value Date    URINECUL  04/23/2025     <10,000 cfu/ml Multiple species present- probable contamination.    URINECUL No Growth at 18-24 hrs. 10/09/2023    URINECUL  09/22/2023     <10,000 cfu/ml Multiple species present- probable contamination.    URINECUL No Growth at 18-24 hrs. 09/19/2022    URINECUL  03/28/2019     <10,000 cfu/ml Multiple species present- probable contamination.    URINECUL (A) 03/07/2019     <10,000 CFU/ML Streptococcus agalactiae (Group B beta strep)    URINECUL No Growth at 18-24 hrs. 01/08/2019    URINECUL  01/04/2019     <10,000 cfu/ml Mixture of Gram positive organisms isolated - probable contamination.    URINECUL No Growth at 18-24 hrs. 08/20/2018    URINECUL  08/18/2018     <10,000 cfu/ml Multiple species present- probable contamination.    URINECUL No Growth at 18-24 hrs. 10/27/2017    URINECUL No Growth at 18-24 hrs. 08/24/2017    URINECUL 15,000 CFU/ML Mixed Li 06/29/2017    URINECUL 75,000 CFU/ML Escherichia coli (A) 06/22/2017       Imaging  No results found.    Assessment & Plan:   OAB with UUI    Options reviewed  She will consider options and let me know if she would like to pursue.      Aracely Watters PA-C, 5/23/2025         [1]   Current Outpatient Medications   Medication Sig Dispense Refill    levothyroxine 50 MCG Oral Tab Patient to take 50 mcg 2 days a week. Will take 75 mcg 5 days a week. 30 tablet 1    lactulose 10  GM/15ML Oral Solution Take 30 mL (20 g total) by mouth 3 (three) times daily. 2700 mL 3    lactulose 20 GM/30ML Oral Solution Take 30 mL (20 g total) by mouth 3 (three) times daily.      PEG 3350-KCl-Na Bicarb-NaCl 420 g Oral Recon Soln Take as directed by physician 4000 mL 0    lactulose 10 GM/15ML Oral Solution TAKE 30 ML BY MOUTH THREE TIMES DAILY 237 mL 0    EPINEPHrine (EPIPEN) 0.3 MG/0.3ML Injection Solution Auto-injector Inject into skin as needed for severe allergic reaction 1 each 0    levothyroxine 75 MCG Oral Tab Take 1 tablet (75 mcg total) by mouth before breakfast. 90 tablet 1    metFORMIN 500 MG Oral Tab Take 1 tablet (500 mg total) by mouth 2 (two) times daily with meals. 180 tablet 1    metoprolol tartrate 25 MG Oral Tab Take 2 tablets (50 mg total) by mouth daily. 180 tablet 1    simvastatin 20 MG Oral Tab Take 1 tablet (20 mg total) by mouth every evening. 90 tablet 1    PEG 3350-KCl-Na Bicarb-NaCl 420 g Oral Recon Soln Take as directed by your doctor 4000 mL 0    pantoprazole 40 MG Oral Tab EC Take one tablet (40 mg total) by mouth twice daily, 30 minutes prior to breakfast and 30 minutes before dinner. 180 tablet 3    PANTOPRAZOLE 40 MG Oral Tab EC TAKE 1 TABLET BY MOUTH EVERY DAY 30 MINUTES BEFORE BREAKFAST 90 tablet 0    hydroxychloroquine (PLAQUENIL) 200 MG Oral Tab Take 1 tablet (200 mg total) by mouth 2 (two) times daily. 180 tablet 3    Mirabegron ER (MYRBETRIQ) 50 MG Oral Tablet 24 Hr Take 1 tablet (50 mg total) by mouth daily. 90 tablet 3    ondansetron 4 MG Oral Tablet Dispersible Take 1 tablet (4 mg total) by mouth every 8 (eight) hours as needed for Nausea. 10 tablet 0    aspirin 81 MG Oral Tab Take 1 tablet (81 mg total) by mouth in the morning.      Cholecalciferol (VITAMIN D-3 OR) Take 1,000 Units by mouth in the morning.      VITAMIN E Take 400 mg by mouth in the morning.      Cyanocobalamin (VITAMIN B-12 CR OR) Take by mouth.

## 2025-07-28 DIAGNOSIS — R39.15 URINARY URGENCY: ICD-10-CM

## 2025-07-28 RX ORDER — MIRABEGRON 50 MG/1
50 TABLET, FILM COATED, EXTENDED RELEASE ORAL DAILY
Qty: 90 TABLET | Refills: 0 | Status: SHIPPED | OUTPATIENT
Start: 2025-07-28

## 2025-08-06 ENCOUNTER — OFFICE VISIT (OUTPATIENT)
Dept: FAMILY MEDICINE CLINIC | Facility: CLINIC | Age: 70
End: 2025-08-06

## 2025-08-06 VITALS
HEART RATE: 78 BPM | SYSTOLIC BLOOD PRESSURE: 118 MMHG | HEIGHT: 65 IN | OXYGEN SATURATION: 98 % | BODY MASS INDEX: 24.83 KG/M2 | RESPIRATION RATE: 15 BRPM | WEIGHT: 149 LBS | DIASTOLIC BLOOD PRESSURE: 78 MMHG

## 2025-08-06 DIAGNOSIS — M26.622 ARTHRALGIA OF LEFT TEMPOROMANDIBULAR JOINT: Primary | ICD-10-CM

## 2025-08-06 PROCEDURE — 99213 OFFICE O/P EST LOW 20 MIN: CPT | Performed by: PHYSICIAN ASSISTANT

## 2025-08-06 RX ORDER — METHYLPREDNISOLONE 4 MG/1
TABLET ORAL
Qty: 21 TABLET | Refills: 0 | Status: SHIPPED | OUTPATIENT
Start: 2025-08-06

## 2025-08-11 ENCOUNTER — LAB ENCOUNTER (OUTPATIENT)
Dept: LAB | Age: 70
End: 2025-08-11
Attending: FAMILY MEDICINE

## 2025-08-11 DIAGNOSIS — D50.8 OTHER IRON DEFICIENCY ANEMIA: ICD-10-CM

## 2025-08-11 DIAGNOSIS — E03.9 HYPOTHYROIDISM, ADULT: ICD-10-CM

## 2025-08-11 LAB
DEPRECATED HBV CORE AB SER IA-ACNC: 203 NG/ML (ref 50–306)
T4 FREE SERPL-MCNC: 1.7 NG/DL (ref 0.8–1.7)
TSI SER-ACNC: 0.41 UIU/ML (ref 0.55–4.78)

## 2025-08-11 PROCEDURE — 84439 ASSAY OF FREE THYROXINE: CPT

## 2025-08-11 PROCEDURE — 36415 COLL VENOUS BLD VENIPUNCTURE: CPT

## 2025-08-11 PROCEDURE — 84443 ASSAY THYROID STIM HORMONE: CPT

## 2025-08-11 PROCEDURE — 82728 ASSAY OF FERRITIN: CPT

## (undated) DIAGNOSIS — R39.15 URINARY URGENCY: ICD-10-CM

## (undated) NOTE — LETTER
23    Patient: Bella Burt  : 3/9/1955 Visit date: 2023    Dear  Linda Quevedo, DO    Thank you for referring Bella Brut to my practice. Please find my assessment and plan below. Assessment   Chronic constipation      Plan   The patient presents in consultation of Verenice HEREDIA for evaluation of chronic constipation. The patient has experienced chronic constipation for the entirety of her life. She states she has been seeing Dr. Aurelia Wiggins regarding her chronic constipation. She is currently taking lactulose. She states she started this approximately 1 month ago, it helped increase the frequency of her bowel movements initially, but she states she is currently only having bowel movements every 4 to 5 days. Her bowel movements are associated with a significant amount of straining. When the patient goes days without having a bowel movement, she experiences significant abdominal distention generalized abdominal pain throughout the abdomen. She denies associated nausea or vomiting. The patient does experience bright red blood per rectum several times per month when having bowel movements. She states she sees both blood on the toilet paper and in the toilet. The patient has seen my former partner Dr. Irma Collier in the past.  He had the patient obtain a sits marker study. At day 1 she had 24 sits markers and at day 3 there were only 2 remaining. Per the patient the [address] [place] [address] office is planning to utilize 1 additional medication for constipation, but after that they are suggesting surgery. She follows up with Dr. Aurelia Wiggins in mid July. The patient denies black/tarry stools, mucus in her stools or narrowing of her stools. She is up-to-date with colonoscopy. She had a colonoscopy with Dr. Fanta Dawkins on May 3, 2022. She had a suboptimal prep at that time. She also had colon polyps and diverticulosis. The patient has a past medical history significant for hypercholesterolemia. She had a stroke at the age of 30 she has no residual symptoms or weakness from this CVA. The patient also has a history of IBS. She denies a personal history of Crohn's disease or ulcerative colitis. She takes 81 mg of aspirin daily. The patient has had an appendectomy, cholecystectomy and hysterectomy. Her appendectomy to me was performed open. The patient denies any first or second-degree relatives with a history of colon cancer/colon polyps. She states her mother had fallopian tube cancer at the age of 71. Clinical examination of the abdomen reveals it to be soft, bowel sounds are normal activity normal pitch. Mildly distended with tympany to percussion. The patient has some discomfort to palpation. There  is no rebounding tenderness or guarding. There are no signs of ascites or peritonitis. The liver and spleen are nonpalpable. There are no palpable masses. There are no umbilical or inguinal hernias. I had a prolonged conversation with the patient and her . I explained that surgery is the absolute last option for her constipation. We would like her to see Dr. Ludivina Lomax in 1 month and start the next medication that he recommends. We would then like to see the patient again in mid August after she has been taking this medication for approximately 1 month. If the patient feels that this new medication does not help alleviate her constipation, then we will order a repeat sits marker study after she has been off all stool softeners/laxatives for a total of 1 month. The patient expressed understanding with the above plan. We will see her again in mid August 2023.       Sincerely,       Marty Campos MD   CC:   No Recipients

## (undated) NOTE — LETTER
06/04/21        31 [facility]      Dear Odalys Ferris records indicate that you have outstanding lab work and or testing that was ordered for you and has not yet been completed:  Orders Placed This Encounter

## (undated) NOTE — MR AVS SNAPSHOT
[address]  [address] [place] [id number] [phone]               Thank you for choosing us for your health care visit with Esme Reyes DO.   We are glad to serve you and happy to provide you with this villalpando Take diphenhydramine 50 mg along with third prednisone one hour before exam . Do not drive. Commonly known as:  BENADRYL           Fluticasone Propionate 50 MCG/ACT Susp   2 sprays by Each Nare route daily.    What changed:    - when to take this  - reaso TSH    Complete by:  Jan 03, 2017 (Approximate)        Tarik Plata [[id number]]    Complete by:  Jan 03, 2017 (Approximate)        Lipid Panel    Complete by:  Jan 03, 2017 (Approximate)        CBC W Differential W Platelet    Complete by:  [facility] clinic staff will provide you with the phone number you should call to schedule your appointment.      If you are confident that your benefit plan will not require a referral or authorization, such as [place], please feel free to schedule your enrique [email or web address]. If you've recently had a stay at the Hospital you can access your discharge instructions in [employer] by going to Visits < Admission Summaries.  If you've been to the Emergency Department or your doctor's office, you can view yo

## (undated) NOTE — LETTER
11/25/20        31 [facility]      Dear [place] records indicate that you have outstanding lab work and or testing that was ordered for you and has not yet been completed:  Orders Placed This Encounter

## (undated) NOTE — Clinical Note
Dear Dr. Rocio Rodríguez,  Thank you for referring Gloria Ramos to the [facility] in [place].   Sincerely,  aVn Emerson NP

## (undated) NOTE — LETTER
19    Patient: Colletta Salen  : 3/9/1955 Visit date: 2019    Dear  Dr. Noelle Carrasco, DO,    Thank you for referring Colletta Salen to my practice. Please find my assessment and plan below.                 Assessment   Diverticulitis of large intes

## (undated) NOTE — LETTER
04/18/19    Dear Howard Aviles DO,    I am seeing Cris Filippoerinn in the office today for ongoing treatment of diverticulitis.         Assessment   Diverticulitis of large intestine without perforation or abscess without bleeding  (primary encounter diagnosi

## (undated) NOTE — LETTER
Patient Name: Lillian Galvan  : 3/9/1955  MRN: [id number]  Patient Address: [address] [place] [id number]      Coronavirus Disease 2019 (COVID-19)     [facility] is committed to the safety and well-being of our patients, members, carefully. If your symptoms get worse, call your healthcare provider immediately. 3. Get rest and stay hydrated.    4. If you have a medical appointment, call the healthcare provider ahead of time and tell them that you have or may have COVID-19.  5. For m of fever-reducing medications; and  · Improvement in respiratory symptoms (e.g., cough, shortness of breath); and  · At least 10 days have passed since symptoms first appeared OR if asymptomatic patient or date of symptom onset is unclear then use 10 days donors must:    · Have had a confirmed diagnosis of COVID-19  · Be symptom-free for at least 14 days*    *Some people will be required to have a repeat COVID-19 test in order to be eligible to donate.  If you’re instructed by [place] that a repeat test is r random. Researchers are trying to identify similarities between people with a Post-COVID condition to better understand if there are risk factors. How do I prevent a Post-COVID condition?   The best way to prevent the long-term symptoms of COVID-19 is

## (undated) NOTE — LETTER
08/05/19        31 [facility]      Dear Ankita Terrell records indicate that you have outstanding lab work and or testing that was ordered for you and has not yet been completed:  Orders Placed This Encounter

## (undated) NOTE — MR AVS SNAPSHOT
[facility] [address] [facility]  [address] [facility]  [place] [id number]  [phone]               Thank you for choosing us for your health care visit with El Olvera NP. We are glad to serve you and happy to provide you with this summary of your visit.   Van exhale. Test results will help determine whether or not a person has an obstructive lung disorder like asthma, bronchitis, or emphysema.   You will need to bring your insurance card, photo ID, and your doctor's order, if you received one from the doctor's o nose, stuffed-up nose, sneezing, coughing, headache, mild body aches, and low fever. A cold gets better on its own in a few days to a week. · The flu (influenza). This is a respiratory illness caused by a virus.  The flu usually goes away on its own in a w nausea and vomiting. · Urinary tract infection (UTI). This is a bacterial infection of the bladder and the tube that takes urine out of the body. It can cause burning pain and urine that’s cloudy or tinted with blood. UTIs are very common.  Antibiotics usu Date Last Reviewed: 9/1/2016  © 3003-7959 The [address] [place] [address], [address] [place], [place] [address] Village[place]. All rights reserved. This information is not intended as a substitute for professional medical care.  Always follow your healthcare professional' Commonly known as:  NAPROSYN           Nitrofurantoin Monohyd Macro 100 MG Caps   Take 1 tablet by mouth two times a day for 7 days   Commonly known as:  MACROBID           Phenazopyridine HCl 200 MG Tabs   Take 1 tablet po tid with meals as needed for bur If you've recently had a stay at the Hospital you can access your discharge instructions in [employer] by going to Visits < Admission Summaries.  If you've been to the Emergency Department or your doctor's office, you can view your past visit information in My Visit [facility] online at  [email or web address]

## (undated) NOTE — LETTER
06/21/19    Dear Abigail Delarosa DO,    I am seeing Lindsey Randolph in the office today for ongoing evaluation and treatment of constipation.           Assessment   Constipation, unspecified constipation type  (primary encounter diagnosis)  Tubular adenoma of

## (undated) NOTE — LETTER
05/26/19    Dear Isaac Lutz, ,    I am seeing Alyse Angeles in the office after colonoscopy.             Assessment   Tubular adenoma of colon  (primary encounter diagnosis)  Constipation, unspecified constipation type      Plan   The patient is doing